# Patient Record
Sex: MALE | Race: WHITE | NOT HISPANIC OR LATINO | Employment: OTHER | URBAN - METROPOLITAN AREA
[De-identification: names, ages, dates, MRNs, and addresses within clinical notes are randomized per-mention and may not be internally consistent; named-entity substitution may affect disease eponyms.]

---

## 2017-10-18 ENCOUNTER — OFFICE VISIT (OUTPATIENT)
Dept: SLEEP MEDICINE | Facility: HOSPITAL | Age: 62
End: 2017-10-18
Attending: INTERNAL MEDICINE

## 2017-10-18 DIAGNOSIS — Z99.89 OSA ON CPAP: Primary | ICD-10-CM

## 2017-10-18 DIAGNOSIS — IMO0001 CLASS 3 OBESITY DUE TO EXCESS CALORIES WITH SERIOUS COMORBIDITY AND BODY MASS INDEX (BMI) OF 40.0 TO 44.9 IN ADULT: ICD-10-CM

## 2017-10-18 DIAGNOSIS — G47.14 HYPERSOMNIA DUE TO ANOTHER MEDICAL CONDITION: ICD-10-CM

## 2017-10-18 DIAGNOSIS — G47.33 OSA ON CPAP: Primary | ICD-10-CM

## 2017-10-18 PROCEDURE — 99213 OFFICE O/P EST LOW 20 MIN: CPT | Performed by: INTERNAL MEDICINE

## 2017-10-18 PROCEDURE — G0463 HOSPITAL OUTPT CLINIC VISIT: HCPCS

## 2017-10-22 PROBLEM — IMO0001 CLASS 3 OBESITY DUE TO EXCESS CALORIES WITH SERIOUS COMORBIDITY AND BODY MASS INDEX (BMI) OF 40.0 TO 44.9 IN ADULT: Status: ACTIVE | Noted: 2017-10-22

## 2017-10-22 PROBLEM — G47.14 HYPERSOMNIA DUE TO ANOTHER MEDICAL CONDITION: Status: ACTIVE | Noted: 2017-10-22

## 2017-10-22 NOTE — PROGRESS NOTES
[facility] Note       Patient Care Team:  Igor Maher MD as PCP - General  Igor Maher MD as PCP - Family Medicine  John Abernathy MD - Sleep Medicine    Chief Complaint:  ANNA     Interval History:   The patient was last seen by me in October 2016.  He is stable and unchanged.  He goes to bed at a p.m. and awakens at 3 AM.  He wakes up 4 times during sleep.  [place] Sleepiness Scale is abnormal at 16.    Review of Systems:  Recorded on the Sleep Questionnaire.  Unremarkable .    Social History:  He does not smoke cigarettes.  No alcohol.  2 sodas a day.    Allergies:  Adhesive tape; Darvon [propoxyphene]; and Oxycodone     Medication Review:  His list was reviewed.      Vital Signs:  Height 72 inches and weight 325 and he is morbidly obese with a body mass index of 44.    Physical Exam:    Constitutional:  Well developed white male and appears in no apparent distress.  Awake & oriented times 3.  Normal mood with normal recent and remote memory and normal judgement.  Eyes:  Conjunctivae normal.  Oropharynx:  moist mucous membranes without exudate and a normal sized tongue and class III MP airway and posterior pharyngeal region not well seen.      Results Review:  DME is [facility] and he uses nasal pillows.  Downloads between April 21 and October 17, 2017 compliances 99% and average usage 6 hours and 58 minutes and average AHI normal without leak and average auto CPAP pressure is 13.  His auto CPAP is 12-20.       Impression:   Obstructive sleep apnea adequately treated with auto titrating CPAP with good compliance and usage and persistent complaints of hypersomnolence.  The patient has a circadian rhythm sleep disorder, advanced sleep phase type.  He arises early to go to work.      Plan:  Good sleep hygiene measures should be maintained.  Weight loss would be beneficial in this patient who is obese by BMI.  The patient is benefiting from the treatment being provided.     The  patient will continue auto CPAP.  A new prescription will be sent to his DME.    The patient will call for any problems and will follow up in one year.      John Abernathy MD  10/22/17  3:21 PM

## 2018-10-17 ENCOUNTER — OFFICE VISIT (OUTPATIENT)
Dept: SLEEP MEDICINE | Facility: HOSPITAL | Age: 63
End: 2018-10-17
Attending: INTERNAL MEDICINE

## 2018-10-17 VITALS
WEIGHT: 315 LBS | OXYGEN SATURATION: 96 % | BODY MASS INDEX: 41.75 KG/M2 | DIASTOLIC BLOOD PRESSURE: 79 MMHG | SYSTOLIC BLOOD PRESSURE: 154 MMHG | HEIGHT: 73 IN | HEART RATE: 88 BPM

## 2018-10-17 DIAGNOSIS — Z99.89 OSA ON CPAP: Primary | ICD-10-CM

## 2018-10-17 DIAGNOSIS — G47.14 HYPERSOMNIA DUE TO ANOTHER MEDICAL CONDITION: ICD-10-CM

## 2018-10-17 DIAGNOSIS — G47.26 CIRCADIAN RHYTHM SLEEP DISORDER, SHIFT WORK TYPE: ICD-10-CM

## 2018-10-17 DIAGNOSIS — E66.01 CLASS 3 SEVERE OBESITY DUE TO EXCESS CALORIES WITH SERIOUS COMORBIDITY AND BODY MASS INDEX (BMI) OF 40.0 TO 44.9 IN ADULT (HCC): ICD-10-CM

## 2018-10-17 DIAGNOSIS — G47.22 CIRCADIAN RHYTHM SLEEP DISORDER, ADVANCED SLEEP PHASE TYPE: ICD-10-CM

## 2018-10-17 DIAGNOSIS — G47.33 OSA ON CPAP: Primary | ICD-10-CM

## 2018-10-17 PROCEDURE — 99213 OFFICE O/P EST LOW 20 MIN: CPT | Performed by: INTERNAL MEDICINE

## 2018-10-17 PROCEDURE — G0463 HOSPITAL OUTPT CLINIC VISIT: HCPCS

## 2018-10-17 NOTE — PROGRESS NOTES
"[facility] Note     Chief Complaint:  ANNA     Primary Care Physician: Igor Maher MD    Interval History:   The patient was seen one year ago.  He is stable.  He goes to bed at a p.m. and awakens at 3 AM.  He wakes up twice during the nighttime and will use the bathroom.  [place] Sleepiness Scale abnormal at 15.    Review of Systems:  Recorded on the Sleep Questionnaire.  Unremarkable     Social History:    Social History     Social History   • Marital status:      Social History Main Topics   • Smoking status: Never Smoker   • Alcohol use No   • Drug use: Unknown     Other Topics Concern   • Not on file       Allergies:  Adhesive tape; Darvon [propoxyphene]; and Oxycodone     Medication Review:  Reviewed.      Vital Signs:    Vitals:    10/17/18 0700   BP: 154/79   BP Location: Left arm   Patient Position: Sitting   Pulse: 88   SpO2: 96%   Weight: (!) 151 kg (332 lb 8 oz)   Height: 185.4 cm (73\")     Body mass index is 43.87 kg/m².    Physical Exam:    Constitutional:  Well developed white male and appears in no apparent distress.  Awake & oriented times 3.  Normal mood with normal recent and remote memory and normal judgement.  Eyes:  Conjunctivae normal.  Oropharynx:  moist mucous membranes without exudate and a normal sized tongue and class III MP airway      Results Review:  DME is [facility] and he uses a nasal interface.  Downloads between July 19 and October 16, 2018 compliance 100%.  Average usage is 7 hours and 35 minutes.  Average AHI is normal without leak.  Average AutoCPAP pressure is 12.9 and his auto CPAP is 12-20.       Impression:   Obstructive sleep apnea adequately treated with auto titrating CPAP with good compliance and usage and persistent complaints of hypersomnolence.  The patient has a circadian rhythm sleep disorder, advanced sleep phase type.  He arises early to go to work.         Plan:  Good sleep hygiene measures should be maintained.  Weight loss would " be beneficial in this patient who is morbidly obese by BMI.  The patient is benefiting from the treatment being provided.     Patient will continue auto CPAP.  A new prescription will be sent to a new DME for all needed supplies.    The patient will call for any problems and will follow up in one year.      John Abernathy MD  Sleep Medicine  10/17/18  8:37 AM

## 2018-10-30 PROBLEM — E66.01 CLASS 3 SEVERE OBESITY DUE TO EXCESS CALORIES WITH SERIOUS COMORBIDITY AND BODY MASS INDEX (BMI) OF 40.0 TO 44.9 IN ADULT: Status: ACTIVE | Noted: 2017-10-22

## 2018-10-30 PROBLEM — G47.26 CIRCADIAN RHYTHM SLEEP DISORDER, SHIFT WORK TYPE: Status: ACTIVE | Noted: 2018-10-30

## 2018-10-30 PROBLEM — E66.813 CLASS 3 SEVERE OBESITY DUE TO EXCESS CALORIES WITH SERIOUS COMORBIDITY AND BODY MASS INDEX (BMI) OF 40.0 TO 44.9 IN ADULT: Status: ACTIVE | Noted: 2017-10-22

## 2018-10-30 PROBLEM — G47.22 CIRCADIAN RHYTHM SLEEP DISORDER, ADVANCED SLEEP PHASE TYPE: Status: ACTIVE | Noted: 2018-10-30

## 2018-11-13 ENCOUNTER — OFFICE VISIT (OUTPATIENT)
Dept: ORTHOPEDIC SURGERY | Facility: CLINIC | Age: 63
End: 2018-11-13

## 2018-11-13 VITALS — HEIGHT: 73 IN | WEIGHT: 315 LBS | TEMPERATURE: 99 F | BODY MASS INDEX: 41.75 KG/M2

## 2018-11-13 DIAGNOSIS — M17.12 PRIMARY OSTEOARTHRITIS OF LEFT KNEE: Primary | ICD-10-CM

## 2018-11-13 PROCEDURE — 99213 OFFICE O/P EST LOW 20 MIN: CPT | Performed by: NURSE PRACTITIONER

## 2018-11-13 PROCEDURE — 20610 DRAIN/INJ JOINT/BURSA W/O US: CPT | Performed by: NURSE PRACTITIONER

## 2018-11-13 PROCEDURE — 73562 X-RAY EXAM OF KNEE 3: CPT | Performed by: NURSE PRACTITIONER

## 2018-11-13 RX ORDER — METHYLPREDNISOLONE ACETATE 80 MG/ML
80 INJECTION, SUSPENSION INTRA-ARTICULAR; INTRALESIONAL; INTRAMUSCULAR; SOFT TISSUE
Status: COMPLETED | OUTPATIENT
Start: 2018-11-13 | End: 2018-11-13

## 2018-11-13 RX ORDER — LIDOCAINE HYDROCHLORIDE 10 MG/ML
2 INJECTION, SOLUTION EPIDURAL; INFILTRATION; INTRACAUDAL; PERINEURAL
Status: COMPLETED | OUTPATIENT
Start: 2018-11-13 | End: 2018-11-13

## 2018-11-13 RX ADMIN — LIDOCAINE HYDROCHLORIDE 2 ML: 10 INJECTION, SOLUTION EPIDURAL; INFILTRATION; INTRACAUDAL; PERINEURAL at 09:10

## 2018-11-13 RX ADMIN — METHYLPREDNISOLONE ACETATE 80 MG: 80 INJECTION, SUSPENSION INTRA-ARTICULAR; INTRALESIONAL; INTRAMUSCULAR; SOFT TISSUE at 09:10

## 2018-11-13 NOTE — PROGRESS NOTES
"Patient: John Paul Cisneros  YOB: 1955 63 y.o. male  Medical Record Number: [id number]    Chief Complaints:   Chief Complaint   Patient presents with   • Left Knee - Establish Care, Pain       History of Present Illness:John Paul Cisneros is a 63 y.o. male who presents with a new complaint of left knee pain.  Previous right total knee replacement couple years ago and reports right knee\" is doing great\" but started with increased left knee pain about a month ago after doing some yardwork for his mom.  He describes left knee pain as a moderate intermittent ache with buckling and swelling.  Worse with standing sitting.    Allergies:   Allergies   Allergen Reactions   • Adhesive Tape    • Darvon [Propoxyphene]    • Oxycodone        Medications:   Current Outpatient Medications   Medication Sig Dispense Refill   • AMLODIPINE BESYLATE PO Take 10 mg by mouth.     • aspirin (KAY ASPIRIN EC LOW DOSE) 81 MG EC tablet Take 81 mg by mouth Daily.     • atorvastatin (LIPITOR) 80 MG tablet Take 80 mg by mouth Daily.     • febuxostat (ULORIC) 40 MG tablet Take 40 mg by mouth Daily.     • omeprazole (priLOSEC) 40 MG capsule Take 40 mg by mouth Daily.     • Testosterone (AXIRON) 30 MG/ACT solution Place  on the skin.       No current facility-administered medications for this visit.          The following portions of the patient's history were reviewed and updated as appropriate: allergies, current medications, past family history, past medical history, past social history, past surgical history and problem list.    Review of Systems:   A 14 point review of systems was performed. All systems negative except pertinent positives/negative listed in HPI above    Physical Exam:   Vitals:    11/13/18 0846   Temp: 99 °F (37.2 °C)   TempSrc: Temporal   Weight: (!) 149 kg (329 lb)   Height: 185.4 cm (73\")       General: A and O x 3, ASA, NAD    SCLERA:    Normal    DENTITION:   Normal  Skin clear no unusual lesions noted  Left " knee patient has trace amount of effusion noted with 115° flexion neutron extension with a positive Rasheed negative Lockman calf soft and nontender    Radiology:  Xrays 3views (ap,lateral, sunrise) left knee were ordered and reviewed today secondary to pain and show bone-on-bone end-stage osteoarthritis.  Comparative views are unchanged     Assessment/Plan:  End-stage osteoarthritis left knee    Prior to injection risks were discussed including pain, infection, elevated blood sugar.  Patient verbalized understanding would like to proceed with injection.  He was referred outpatient physical therapy.  We set a 30 pound weight loss goal because most likely the patient is planning need a total knee replacement at some point.  Patient verbalized understanding and we will see him back as needed    Large Joint Arthrocentesis: L knee  Date/Time: 11/13/2018 9:10 AM  Consent given by: patient  Site marked: site marked  Timeout: Immediately prior to procedure a time out was called to verify the correct patient, procedure, equipment, support staff and site/side marked as required   Supporting Documentation  Indications: pain and joint swelling   Procedure Details  Location: knee - L knee  Preparation: Patient was prepped and draped in the usual sterile fashion  Needle gauge: 21 G.  Approach: anterolateral  Medications administered: 80 mg methylPREDNISolone acetate 80 MG/ML; 2 mL lidocaine PF 1% 1 %  Patient tolerance: patient tolerated the procedure well with no immediate complications

## 2018-12-03 ENCOUNTER — TELEPHONE (OUTPATIENT)
Dept: SLEEP MEDICINE | Facility: HOSPITAL | Age: 63
End: 2018-12-03

## 2018-12-03 NOTE — TELEPHONE ENCOUNTER
Faxed order for new equipment to [facility] [place]. Patient previously with [facility] not happy with them and requested to go to [facility]

## 2019-07-10 ENCOUNTER — HOSPITAL ENCOUNTER (EMERGENCY)
Facility: HOSPITAL | Age: 64
Discharge: HOME | End: 2019-07-10
Attending: EMERGENCY MEDICINE | Admitting: EMERGENCY MEDICINE

## 2019-07-10 ENCOUNTER — APPOINTMENT (OUTPATIENT)
Dept: GENERAL RADIOLOGY | Facility: HOSPITAL | Age: 64
End: 2019-07-10

## 2019-07-10 VITALS
RESPIRATION RATE: 18 BRPM | HEIGHT: 72 IN | HEART RATE: 75 BPM | OXYGEN SATURATION: 98 % | DIASTOLIC BLOOD PRESSURE: 94 MMHG | SYSTOLIC BLOOD PRESSURE: 164 MMHG | WEIGHT: 315 LBS | TEMPERATURE: 98.4 F | BODY MASS INDEX: 42.66 KG/M2

## 2019-07-10 DIAGNOSIS — K21.00 GASTROESOPHAGEAL REFLUX DISEASE WITH ESOPHAGITIS: ICD-10-CM

## 2019-07-10 DIAGNOSIS — R07.89 ATYPICAL CHEST PAIN: Primary | ICD-10-CM

## 2019-07-10 LAB
ALBUMIN SERPL-MCNC: 3.9 G/DL (ref 3.5–5.2)
ALBUMIN/GLOB SERPL: 1 G/DL
ALP SERPL-CCNC: 124 U/L (ref 39–117)
ALT SERPL W P-5'-P-CCNC: 18 U/L (ref 1–41)
ANION GAP SERPL CALCULATED.3IONS-SCNC: 13.3 MMOL/L (ref 5–15)
APTT PPP: 31.9 SECONDS (ref 24.3–38.1)
AST SERPL-CCNC: 18 U/L (ref 1–40)
BASOPHILS # BLD AUTO: 0.07 10*3/MM3 (ref 0–0.2)
BASOPHILS NFR BLD AUTO: 0.8 % (ref 0–1.5)
BILIRUB SERPL-MCNC: 0.4 MG/DL (ref 0.2–1.2)
BUN BLD-MCNC: 14 MG/DL (ref 8–23)
BUN/CREAT SERPL: 15.6 (ref 7–25)
CALCIUM SPEC-SCNC: 9 MG/DL (ref 8.6–10.5)
CHLORIDE SERPL-SCNC: 102 MMOL/L (ref 98–107)
CO2 SERPL-SCNC: 24.7 MMOL/L (ref 22–29)
CREAT BLD-MCNC: 0.9 MG/DL (ref 0.76–1.27)
D DIMER PPP FEU-MCNC: 0.46 MCGFEU/ML (ref 0–0.46)
DEPRECATED RDW RBC AUTO: 42.2 FL (ref 37–54)
EOSINOPHIL # BLD AUTO: 0.23 10*3/MM3 (ref 0–0.4)
EOSINOPHIL NFR BLD AUTO: 2.7 % (ref 0.3–6.2)
ERYTHROCYTE [DISTWIDTH] IN BLOOD BY AUTOMATED COUNT: 13.5 % (ref 12.3–15.4)
GFR SERPL CREATININE-BSD FRML MDRD: 84 ML/MIN/1.73
GLOBULIN UR ELPH-MCNC: 4 GM/DL
GLUCOSE BLD-MCNC: 133 MG/DL (ref 65–99)
HCT VFR BLD AUTO: 51.9 % (ref 37.5–51)
HGB BLD-MCNC: 16.5 G/DL (ref 13–17.7)
IMM GRANULOCYTES # BLD AUTO: 0.06 10*3/MM3 (ref 0–0.05)
IMM GRANULOCYTES NFR BLD AUTO: 0.7 % (ref 0–0.5)
INR PPP: 0.94 (ref 0.9–1.1)
LIPASE SERPL-CCNC: 18 U/L (ref 13–60)
LYMPHOCYTES # BLD AUTO: 1.45 10*3/MM3 (ref 0.7–3.1)
LYMPHOCYTES NFR BLD AUTO: 17.3 % (ref 19.6–45.3)
MCH RBC QN AUTO: 27.3 PG (ref 26.6–33)
MCHC RBC AUTO-ENTMCNC: 31.8 G/DL (ref 31.5–35.7)
MCV RBC AUTO: 85.8 FL (ref 79–97)
MONOCYTES # BLD AUTO: 1.01 10*3/MM3 (ref 0.1–0.9)
MONOCYTES NFR BLD AUTO: 12.1 % (ref 5–12)
NEUTROPHILS # BLD AUTO: 5.55 10*3/MM3 (ref 1.7–7)
NEUTROPHILS NFR BLD AUTO: 66.4 % (ref 42.7–76)
NRBC BLD AUTO-RTO: 0 /100 WBC (ref 0–0.2)
PLATELET # BLD AUTO: 339 10*3/MM3 (ref 140–450)
PMV BLD AUTO: 9.9 FL (ref 6–12)
POTASSIUM BLD-SCNC: 4.4 MMOL/L (ref 3.5–5.2)
PROT SERPL-MCNC: 7.9 G/DL (ref 6–8.5)
PROTHROMBIN TIME: 12.3 SECONDS (ref 12.1–15)
RBC # BLD AUTO: 6.05 10*6/MM3 (ref 4.14–5.8)
SODIUM BLD-SCNC: 140 MMOL/L (ref 136–145)
TROPONIN T SERPL-MCNC: <0.01 NG/ML (ref 0–0.03)
TROPONIN T SERPL-MCNC: <0.01 NG/ML (ref 0–0.03)
WBC NRBC COR # BLD: 8.37 10*3/MM3 (ref 3.4–10.8)

## 2019-07-10 PROCEDURE — 85730 THROMBOPLASTIN TIME PARTIAL: CPT | Performed by: EMERGENCY MEDICINE

## 2019-07-10 PROCEDURE — 80053 COMPREHEN METABOLIC PANEL: CPT | Performed by: EMERGENCY MEDICINE

## 2019-07-10 PROCEDURE — 96374 THER/PROPH/DIAG INJ IV PUSH: CPT

## 2019-07-10 PROCEDURE — 99284 EMERGENCY DEPT VISIT MOD MDM: CPT

## 2019-07-10 PROCEDURE — 85610 PROTHROMBIN TIME: CPT | Performed by: EMERGENCY MEDICINE

## 2019-07-10 PROCEDURE — 93005 ELECTROCARDIOGRAM TRACING: CPT | Performed by: EMERGENCY MEDICINE

## 2019-07-10 PROCEDURE — 93010 ELECTROCARDIOGRAM REPORT: CPT | Performed by: INTERNAL MEDICINE

## 2019-07-10 PROCEDURE — 83690 ASSAY OF LIPASE: CPT | Performed by: EMERGENCY MEDICINE

## 2019-07-10 PROCEDURE — 71046 X-RAY EXAM CHEST 2 VIEWS: CPT

## 2019-07-10 PROCEDURE — 85025 COMPLETE CBC W/AUTO DIFF WBC: CPT | Performed by: EMERGENCY MEDICINE

## 2019-07-10 PROCEDURE — 84484 ASSAY OF TROPONIN QUANT: CPT | Performed by: EMERGENCY MEDICINE

## 2019-07-10 PROCEDURE — 85379 FIBRIN DEGRADATION QUANT: CPT | Performed by: EMERGENCY MEDICINE

## 2019-07-10 PROCEDURE — 99284 EMERGENCY DEPT VISIT MOD MDM: CPT | Performed by: EMERGENCY MEDICINE

## 2019-07-10 RX ORDER — ASPIRIN 81 MG/1
TABLET, CHEWABLE ORAL
Status: DISCONTINUED
Start: 2019-07-10 | End: 2019-07-10 | Stop reason: HOSPADM

## 2019-07-10 RX ORDER — MEPERIDINE HYDROCHLORIDE 25 MG/ML
25 INJECTION INTRAMUSCULAR; INTRAVENOUS; SUBCUTANEOUS ONCE
Status: COMPLETED | OUTPATIENT
Start: 2019-07-10 | End: 2019-07-10

## 2019-07-10 RX ORDER — ASPIRIN 81 MG/1
243 TABLET, CHEWABLE ORAL DAILY
Status: DISCONTINUED | OUTPATIENT
Start: 2019-07-10 | End: 2019-07-10 | Stop reason: HOSPADM

## 2019-07-10 RX ORDER — SODIUM CHLORIDE 0.9 % (FLUSH) 0.9 %
10 SYRINGE (ML) INJECTION AS NEEDED
Status: DISCONTINUED | OUTPATIENT
Start: 2019-07-10 | End: 2019-07-10 | Stop reason: HOSPADM

## 2019-07-10 RX ORDER — SUCRALFATE 1 G/1
1 TABLET ORAL 4 TIMES DAILY
Qty: 60 TABLET | Refills: 0 | Status: SHIPPED | OUTPATIENT
Start: 2019-07-10 | End: 2019-08-05 | Stop reason: HOSPADM

## 2019-07-10 RX ORDER — SILDENAFIL CITRATE 20 MG/1
20 TABLET ORAL 3 TIMES DAILY
COMMUNITY
End: 2019-11-05

## 2019-07-10 RX ORDER — ALUMINA, MAGNESIA, AND SIMETHICONE 2400; 2400; 240 MG/30ML; MG/30ML; MG/30ML
SUSPENSION ORAL
Status: COMPLETED
Start: 2019-07-10 | End: 2019-07-10

## 2019-07-10 RX ORDER — MELOXICAM 15 MG/1
15 TABLET ORAL DAILY
COMMUNITY
End: 2019-11-12 | Stop reason: HOSPADM

## 2019-07-10 RX ORDER — MAGNESIUM HYDROXIDE/ALUMINUM HYDROXICE/SIMETHICONE 120; 1200; 1200 MG/30ML; MG/30ML; MG/30ML
20 SUSPENSION ORAL ONCE
Status: DISCONTINUED | OUTPATIENT
Start: 2019-07-10 | End: 2019-07-10 | Stop reason: HOSPADM

## 2019-07-10 RX ADMIN — LIDOCAINE HYDROCHLORIDE 5 ML: 20 SOLUTION ORAL; TOPICAL at 07:56

## 2019-07-10 RX ADMIN — ALUMINUM HYDROXIDE, MAGNESIUM HYDROXIDE, AND DIMETHICONE 30 ML: 400; 400; 40 SUSPENSION ORAL at 07:56

## 2019-07-10 RX ADMIN — NITROGLYCERIN 1 INCH: 20 OINTMENT TOPICAL at 06:38

## 2019-07-10 RX ADMIN — MEPERIDINE HYDROCHLORIDE 25 MG: 25 INJECTION INTRAMUSCULAR; INTRAVENOUS; SUBCUTANEOUS at 08:22

## 2019-07-10 NOTE — ED PROVIDER NOTES
EMERGENCY DEPARTMENT ENCOUNTER      Room Number: 2/02      HPI:    Chief complaint: Lower sternal chest pain    Location: Lower sternal area    Quality/Severity: Described as pressure-like and moderate severity    Timing/Duration: Symptoms started approximately 6 hours prior to arrival    Modifying Factors: Initially, the pain was partly relieved by upright position    Associated Symptoms: Mild nausea    Narrative: Pt is a 64 y.o. male who presents complaining of lower sternal chest pain as noted above.  Patient states that he was awakened by lower sternal chest pain/pressure approximately 6 hours prior to arrival.  The patient states that at first standing up did seem to help relieve the symptoms, but now standing does not seem to make any difference.  Mild nausea.  Family history of coronary artery disease in the patient's father who had a bypass in his 60s.      PMD: Igor Maher MD    REVIEW OF SYSTEMS  Review of Systems   Constitutional: Negative for fatigue and fever.   HENT: Negative for congestion.    Respiratory: Negative for cough, shortness of breath and wheezing.    Cardiovascular: Positive for chest pain. Negative for palpitations.   Gastrointestinal: Positive for nausea. Negative for abdominal pain, diarrhea and vomiting.   Genitourinary: Negative for dysuria, flank pain, hematuria and urgency.   Musculoskeletal: Negative for back pain.   Skin: Negative for rash.   Neurological: Negative for dizziness, weakness and headaches.   Psychiatric/Behavioral: Negative for confusion.   All other systems reviewed and are negative.      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 10/22/2017   • Class 3 severe obesity due to excess calories with serious comorbidity and body mass index (BMI) of 40.0 to 44.9 in adult (CMS/[facility]) 10/22/2017   • Circadian rhythm sleep disorder, advanced sleep phase type  10/30/2018   • Circadian rhythm sleep disorder, shift work type 10/30/2018     Resolved Ambulatory Problems     Diagnosis Date Noted   • No Resolved Ambulatory Problems     Past Medical History:   Diagnosis Date   • DVT (deep venous thrombosis) (CMS/HCC)    • Hypertension    • Osteoarthritis    • Sleep apnea        PAST SURGICAL HISTORY  Past Surgical History:   Procedure Laterality Date   • CARPAL TUNNEL RELEASE  1978   • CHOLECYSTECTOMY  02/2014   • HAMMER TOE REPAIR  2000   • HERNIA REPAIR     • HIP BIPOLAR REPLACEMENT Right 10/2010   • KNEE ARTHROSCOPY  10/2014   • THYROID SURGERY         FAMILY HISTORY  Family History   Problem Relation Age of Onset   • Hypertension Other    • Colon cancer Other        SOCIAL HISTORY  Social History     Socioeconomic History   • Marital status:      Spouse name: Not on file   • Number of children: Not on file   • Years of education: Not on file   • Highest education level: Not on file   Tobacco Use   • Smoking status: Never Smoker   Substance and Sexual Activity   • Alcohol use: No   • Drug use: No       ALLERGIES  Darvon [propoxyphene]; Oxycodone; and Adhesive tape    PHYSICAL EXAM  ED Triage Vitals [07/10/19 0613]   Temp Heart Rate Resp BP SpO2   98.4 °F (36.9 °C) 70 14 (!) 186/96 97 %      Temp src Heart Rate Source Patient Position BP Location FiO2 (%)   Oral Monitor Lying Right arm --       Physical Exam   Constitutional: He is oriented to person, place, and time.   The patient is an obese, 64-year-old, white male in no acute distress, although he appears mildly anxious   HENT:   Head: Normocephalic and atraumatic.   Eyes: Conjunctivae and EOM are normal.   Neck: Normal range of motion. Neck supple.   Cardiovascular: Normal rate, regular rhythm and normal heart sounds.   No murmur heard.  Pulmonary/Chest: Effort normal and breath sounds normal. No respiratory distress.   Abdominal: Soft. Bowel sounds are normal. There is no tenderness.   Musculoskeletal: Normal  range of motion. He exhibits no edema.   Neurological: He is alert and oriented to person, place, and time.   Skin: Skin is warm and dry.   Psychiatric: Affect and judgment normal.   Nursing note and vitals reviewed.      LAB RESULTS  Results for orders placed or performed during the hospital encounter of 07/10/19   Comprehensive Metabolic Panel   Result Value Ref Range    Glucose 133 (H) 65 - 99 mg/dL    BUN 14 8 - 23 mg/dL    Creatinine 0.90 0.76 - 1.27 mg/dL    Sodium 140 136 - 145 mmol/L    Potassium 4.4 3.5 - 5.2 mmol/L    Chloride 102 98 - 107 mmol/L    CO2 24.7 22.0 - 29.0 mmol/L    Calcium 9.0 8.6 - 10.5 mg/dL    Total Protein 7.9 6.0 - 8.5 g/dL    Albumin 3.90 3.50 - 5.20 g/dL    ALT (SGPT) 18 1 - 41 U/L    AST (SGOT) 18 1 - 40 U/L    Alkaline Phosphatase 124 (H) 39 - 117 U/L    Total Bilirubin 0.4 0.2 - 1.2 mg/dL    eGFR Non African Amer 84 >60 mL/min/1.73    Globulin 4.0 gm/dL    A/G Ratio 1.0 g/dL    BUN/Creatinine Ratio 15.6 7.0 - 25.0    Anion Gap 13.3 5.0 - 15.0 mmol/L   Protime-INR   Result Value Ref Range    Protime 12.3 12.1 - 15.0 Seconds    INR 0.94 0.90 - 1.10   aPTT   Result Value Ref Range    PTT 31.9 24.3 - 38.1 seconds   Lipase   Result Value Ref Range    Lipase 18 13 - 60 U/L   Troponin   Result Value Ref Range    Troponin T <0.010 0.000-<0.030 ng/mL   D-dimer, Quantitative   Result Value Ref Range    D-Dimer, Quantitative 0.46 0.00 - 0.46 MCGFEU/mL   CBC Auto Differential   Result Value Ref Range    WBC 8.37 3.40 - 10.80 10*3/mm3    RBC 6.05 (H) 4.14 - 5.80 10*6/mm3    Hemoglobin 16.5 13.0 - 17.7 g/dL    Hematocrit 51.9 (H) 37.5 - 51.0 %    MCV 85.8 79.0 - 97.0 fL    MCH 27.3 26.6 - 33.0 pg    MCHC 31.8 31.5 - 35.7 g/dL    RDW 13.5 12.3 - 15.4 %    RDW-SD 42.2 37.0 - 54.0 fl    MPV 9.9 6.0 - 12.0 fL    Platelets 339 140 - 450 10*3/mm3    Neutrophil % 66.4 42.7 - 76.0 %    Lymphocyte % 17.3 (L) 19.6 - 45.3 %    Monocyte % 12.1 (H) 5.0 - 12.0 %    Eosinophil % 2.7 0.3 - 6.2 %    Basophil %  0.8 0.0 - 1.5 %    Immature Grans % 0.7 (H) 0.0 - 0.5 %    Neutrophils, Absolute 5.55 1.70 - 7.00 10*3/mm3    Lymphocytes, Absolute 1.45 0.70 - 3.10 10*3/mm3    Monocytes, Absolute 1.01 (H) 0.10 - 0.90 10*3/mm3    Eosinophils, Absolute 0.23 0.00 - 0.40 10*3/mm3    Basophils, Absolute 0.07 0.00 - 0.20 10*3/mm3    Immature Grans, Absolute 0.06 (H) 0.00 - 0.05 10*3/mm3    nRBC 0.0 0.0 - 0.2 /100 WBC   Troponin   Result Value Ref Range    Troponin T <0.010 0.000-<0.030 ng/mL         I ordered the above labs and reviewed the results    RADIOLOGY  Xr Chest 2 View    Result Date: 7/10/2019  Narrative: CHEST X-RAY, 07/10/2019     HISTORY: 64-year-old male in the ED complaining of new onset midsternal chest pain beginning last evening.  TECHNIQUE: PA and lateral upright chest x-ray.  FINDINGS: Mild cardiomegaly. Pulmonary vascularity is normal.  Shallow lung expansion with linear scarring or atelectasis in the left lung base. Lungs appear clear. No visible pulmonary edema, airspace consolidation or pleural effusion.  Rounded opacity behind the heart may represent a hiatal hernia.      Impression: 1. No active disease. 2. Mild cardiomegaly. Pulmonary vascularity is normal. 3. Possible hiatal hernia.  This report was finalized on 7/10/2019 7:58 AM by Dr. Stephan Armenta MD.        I ordered the above radiologic testing and reviewed the results    PROCEDURES  Procedures      PROGRESS AND CONSULTS  ED Course as of Jul 10 0948   Wed Jul 10, 2019   0635 Initial EKG reviewed at 061 4 hours and felt to be unreadable due to poor baseline.  Repeat EKG tracing was contemporaneously reviewed at 062 1 hours and showed a normal sinus rhythm with a rate of 68 bpm.  Electrical evidence suggestive of LVH.  P waves, ST segments and T waves unremarkable.  No ectopy.  However, the bedside cardiac monitor did show an occasional unifocal PVC.  [ML]   0729 Patient still with complaint of same discomfort.  All labs noted and unremarkable at this  point.  Modified GI cocktail will be administered as the patient does have a history of GERD.  [ML]   0805 Final radiology report noted.  Patient agreeable to repeat troponin.  [ML]   0816 Case and findings discussed with the oncoming emergency department physician, Dr. Castillo, who has agreed to assume care and disposition on this patient.  No relief with a GI cocktail and a small dose of Demerol will be given.  [ML]   0923 08:15 care assumed from Dr. Rivers.  [TP]   0923 The patient's delta troponin was negative.  In discussion with the patient and he describes the pain is being mid upper abdomen radiating up substernally.  Is my impression, as it was Dr. Rivers's that the pain is GI in origin.  The patient currently takes Prilosec which I encouraged him to continue.  I will prescribe him Carafate.  The patient will be instructed to make a follow-up appointment with his PCP Dr. Maher in the next couple of days.  He will be referred to follow-up with Dr. Bone, GI.  [TP]      ED Course User Index  [ML] Nick Rivers MD  [TP] Nura Castillo MD           MEDICAL DECISION MAKING  Results were reviewed/discussed with the patient and they were also made aware of online access. Pt also made aware that some labs, such as cultures, will not be resulted during ER visit and follow up with PMD is necessary.     MDM  Number of Diagnoses or Management Options  Atypical chest pain: new and requires workup  Gastroesophageal reflux disease with esophagitis: new and requires workup     Amount and/or Complexity of Data Reviewed  Clinical lab tests: ordered and reviewed  Tests in the radiology section of CPT®: ordered and reviewed  Tests in the medicine section of CPT®: ordered and reviewed  Independent visualization of images, tracings, or specimens: yes    Risk of Complications, Morbidity, and/or Mortality  Presenting problems: high  Diagnostic procedures: high  Management options: high  General comments: My  differential diagnosis for chest pain includes but is not limited to:  Muscle strain, costochondritis, myositis, pleurisy, rib fracture, intercostal neuritis, herpes zoster, tumor, myocardial infarction, coronary syndrome, unstable angina, angina, aortic dissection, mitral valve prolapse, pericarditis, palpitations, pulmonary embolus, pneumonia, pneumothorax, lung cancer, GERD, esophagitis, esophageal spasm    Patient Progress  Patient progress: improved         DIAGNOSIS  Final diagnoses:   Atypical chest pain   Gastroesophageal reflux disease with esophagitis       Latest Documented Vital Signs:  As of 9:48 AM  BP- 164/94 HR- 75 Temp- 98.4 °F (36.9 °C) O2 sat- 98%    DISPOSITION  discharge       Medication List      New Prescriptions    sucralfate 1 g tablet  Commonly known as:  CARAFATE  Take 1 tablet by mouth 4 (Four) Times a Day for 60 days.          Follow-up Information     Igor Maher MD. Schedule an appointment as soon as possible for a visit in 2 days.    Specialty:  Family Medicine  Why:  next available  Contact information:  [address] [place] [address] [address]  [facility] [address] [phone]             Schedule an appointment as soon as possible for a visit  with Felton Bone MD.    Specialty:  Gastroenterology  Why:  next available  Contact information:  [address] [facility] [address]  [facility] [id number] [phone]                     Labs Reviewed   COMPREHENSIVE METABOLIC PANEL - Abnormal; Notable for the following components:       Result Value    Glucose 133 (*)     Alkaline Phosphatase 124 (*)     All other components within normal limits    Narrative:     GFR Normal >60  Chronic Kidney Disease <60  Kidney Failure <15   CBC WITH AUTO DIFFERENTIAL - Abnormal; Notable for the following components:    RBC 6.05 (*)     Hematocrit 51.9 (*)     Lymphocyte % 17.3 (*)     Monocyte % 12.1 (*)     Immature Grans % 0.7 (*)     Monocytes, Absolute 1.01 (*)     Immature Grans, Absolute 0.06 (*)      All other components within normal limits   PROTIME-INR - Normal    Narrative:     Therapeutic Ranges for INR: 2.0-3.0 (PT 20-30)                              2.5-3.5 (PT 25-34)   APTT - Normal    Narrative:     PTT = The equivalent PTT values for the therapeutic range of heparin levels at 0.1 to 0.7 U/ml are 53 to 110 seconds.   LIPASE - Normal   TROPONIN (IN-HOUSE) - Normal    Narrative:     Troponin T Reference Range:  <= 0.03 ng/mL-   Negative for AMI  >0.03 ng/mL-     Abnormal for myocardial necrosis.  Clinicians would have to utilize clinical acumen, EKG, Troponin and serial changes to determine if it is an Acute Myocardial Infarction or myocardial injury due to an underlying chronic condition.    D-DIMER, QUANTITATIVE - Normal    Narrative:     Can be elevated in, but is not diagnostic for deep vein thrombosis (DVT) or pulmonary embolis (PE).  It is also elevated in other medical conditions.  Clinical correlation is required.  The negative cut-off value for the D-Dimer is 0.50 mcg FEU/mL for DVT and PE.   TROPONIN (IN-HOUSE) - Normal    Narrative:     Troponin T Reference Range:  <= 0.03 ng/mL-   Negative for AMI  >0.03 ng/mL-     Abnormal for myocardial necrosis.  Clinicians would have to utilize clinical acumen, EKG, Troponin and serial changes to determine if it is an Acute Myocardial Infarction or myocardial injury due to an underlying chronic condition.    CBC AND DIFFERENTIAL    Narrative:     The following orders were created for panel order CBC & Differential.  Procedure                               Abnormality         Status                     ---------                               -----------         ------                     CBC Auto Differential[[id number]]        Abnormal            Final result                 Please view results for these tests on the individual orders.     XR Chest 2 View   ED Interpretation   1. No active disease.   2. Mild cardiomegaly. Pulmonary vascularity is normal.    3. Possible hiatal hernia.       This report was finalized on 7/10/2019 7:58 AM by Dr. Stephan Armenta MD.          Final Result   1. No active disease.   2. Mild cardiomegaly. Pulmonary vascularity is normal.   3. Possible hiatal hernia.       This report was finalized on 7/10/2019 7:58 AM by Dr. Stephan Armenta MD.                 Medication List      New Prescriptions    sucralfate 1 g tablet  Commonly known as:  CARAFATE  Take 1 tablet by mouth 4 (Four) Times a Day for 60 days.               Nura Castillo MD  07/10/19 0949

## 2019-07-16 ENCOUNTER — CLINICAL SUPPORT (OUTPATIENT)
Dept: ORTHOPEDIC SURGERY | Facility: CLINIC | Age: 64
End: 2019-07-16

## 2019-07-16 VITALS — HEIGHT: 73 IN | WEIGHT: 315 LBS | TEMPERATURE: 98.2 F | BODY MASS INDEX: 41.75 KG/M2

## 2019-07-16 DIAGNOSIS — M25.562 LEFT KNEE PAIN, UNSPECIFIED CHRONICITY: Primary | ICD-10-CM

## 2019-07-16 PROCEDURE — 20610 DRAIN/INJ JOINT/BURSA W/O US: CPT | Performed by: NURSE PRACTITIONER

## 2019-07-16 RX ORDER — LIDOCAINE HYDROCHLORIDE 10 MG/ML
2 INJECTION, SOLUTION EPIDURAL; INFILTRATION; INTRACAUDAL; PERINEURAL
Status: COMPLETED | OUTPATIENT
Start: 2019-07-16 | End: 2019-07-16

## 2019-07-16 RX ORDER — METHYLPREDNISOLONE ACETATE 80 MG/ML
80 INJECTION, SUSPENSION INTRA-ARTICULAR; INTRALESIONAL; INTRAMUSCULAR; SOFT TISSUE
Status: COMPLETED | OUTPATIENT
Start: 2019-07-16 | End: 2019-07-16

## 2019-07-16 RX ADMIN — METHYLPREDNISOLONE ACETATE 80 MG: 80 INJECTION, SUSPENSION INTRA-ARTICULAR; INTRALESIONAL; INTRAMUSCULAR; SOFT TISSUE at 13:45

## 2019-07-16 RX ADMIN — LIDOCAINE HYDROCHLORIDE 2 ML: 10 INJECTION, SOLUTION EPIDURAL; INFILTRATION; INTRACAUDAL; PERINEURAL at 13:45

## 2019-07-16 NOTE — PROGRESS NOTES
7/16/2019    John Paul Cisneros is here today for worsening knee pain. Pt has undergone injection of the knee in the past with good resolution of symptoms. Pt is requesting a repeat injection.     KNEE Injection Procedure Note:    Large Joint Arthrocentesis: L knee  Date/Time: 7/16/2019 1:45 PM  Consent given by: patient  Site marked: site marked  Timeout: Immediately prior to procedure a time out was called to verify the correct patient, procedure, equipment, support staff and site/side marked as required   Supporting Documentation  Indications: pain   Procedure Details  Location: knee - L knee  Preparation: Patient was prepped and draped in the usual sterile fashion  Needle size: 22 G  Approach: anterolateral  Medications administered: 2 mL lidocaine PF 1% 1 %; 80 mg methylPREDNISolone acetate 80 MG/ML  Patient tolerance: patient tolerated the procedure well with no immediate complications      Prior to injection risks were discussed including pain, infection, low blood sugar.  Patient verbalized understanding would like to proceed with injection    At the conclusion of the injection I discussed the importance of continued quad strengthening exercises on a daily basis. I will see the patient back if the symptoms should fail to improve or worsen.    Joanne Ortiz APRN  7/16/2019

## 2019-08-05 ENCOUNTER — OFFICE VISIT (OUTPATIENT)
Dept: GASTROENTEROLOGY | Facility: CLINIC | Age: 64
End: 2019-08-05

## 2019-08-05 VITALS
DIASTOLIC BLOOD PRESSURE: 78 MMHG | BODY MASS INDEX: 41.75 KG/M2 | WEIGHT: 315 LBS | SYSTOLIC BLOOD PRESSURE: 144 MMHG | HEIGHT: 73 IN | RESPIRATION RATE: 18 BRPM

## 2019-08-05 DIAGNOSIS — K21.00 GASTROESOPHAGEAL REFLUX DISEASE WITH ESOPHAGITIS: ICD-10-CM

## 2019-08-05 DIAGNOSIS — M48.061 SPINAL STENOSIS OF LUMBAR REGION, UNSPECIFIED WHETHER NEUROGENIC CLAUDICATION PRESENT: ICD-10-CM

## 2019-08-05 DIAGNOSIS — R07.89 OTHER CHEST PAIN: Primary | ICD-10-CM

## 2019-08-05 DIAGNOSIS — Z86.010 PERSONAL HISTORY OF COLONIC POLYPS: ICD-10-CM

## 2019-08-05 PROCEDURE — 99203 OFFICE O/P NEW LOW 30 MIN: CPT | Performed by: INTERNAL MEDICINE

## 2019-08-05 RX ORDER — SUCRALFATE 1 G/1
1 TABLET ORAL 4 TIMES DAILY
Qty: 120 TABLET | Refills: 3 | Status: SHIPPED | OUTPATIENT
Start: 2019-08-05

## 2019-08-27 PROBLEM — R07.89 OTHER CHEST PAIN: Status: ACTIVE | Noted: 2019-08-27

## 2019-09-12 ENCOUNTER — ANESTHESIA EVENT (OUTPATIENT)
Dept: PERIOP | Facility: HOSPITAL | Age: 64
End: 2019-09-12

## 2019-09-13 ENCOUNTER — HOSPITAL ENCOUNTER (OUTPATIENT)
Facility: HOSPITAL | Age: 64
Setting detail: HOSPITAL OUTPATIENT SURGERY
Discharge: HOME | End: 2019-09-13
Attending: INTERNAL MEDICINE | Admitting: INTERNAL MEDICINE

## 2019-09-13 ENCOUNTER — ANESTHESIA (OUTPATIENT)
Dept: PERIOP | Facility: HOSPITAL | Age: 64
End: 2019-09-13

## 2019-09-13 VITALS
HEIGHT: 72 IN | OXYGEN SATURATION: 94 % | TEMPERATURE: 98.6 F | WEIGHT: 315 LBS | SYSTOLIC BLOOD PRESSURE: 159 MMHG | BODY MASS INDEX: 42.66 KG/M2 | HEART RATE: 59 BPM | DIASTOLIC BLOOD PRESSURE: 80 MMHG | RESPIRATION RATE: 16 BRPM

## 2019-09-13 DIAGNOSIS — R07.89 OTHER CHEST PAIN: ICD-10-CM

## 2019-09-13 PROCEDURE — 43239 EGD BIOPSY SINGLE/MULTIPLE: CPT | Performed by: INTERNAL MEDICINE

## 2019-09-13 PROCEDURE — 25010000002 PROPOFOL 10 MG/ML EMULSION: Performed by: NURSE ANESTHETIST, CERTIFIED REGISTERED

## 2019-09-13 PROCEDURE — 88305 TISSUE EXAM BY PATHOLOGIST: CPT | Performed by: INTERNAL MEDICINE

## 2019-09-13 RX ORDER — SODIUM CHLORIDE 9 MG/ML
40 INJECTION, SOLUTION INTRAVENOUS AS NEEDED
Status: DISCONTINUED | OUTPATIENT
Start: 2019-09-13 | End: 2019-09-13 | Stop reason: HOSPADM

## 2019-09-13 RX ORDER — SODIUM CHLORIDE 0.9 % (FLUSH) 0.9 %
1-10 SYRINGE (ML) INJECTION AS NEEDED
Status: DISCONTINUED | OUTPATIENT
Start: 2019-09-13 | End: 2019-09-13 | Stop reason: HOSPADM

## 2019-09-13 RX ORDER — SODIUM CHLORIDE, SODIUM LACTATE, POTASSIUM CHLORIDE, CALCIUM CHLORIDE 600; 310; 30; 20 MG/100ML; MG/100ML; MG/100ML; MG/100ML
9 INJECTION, SOLUTION INTRAVENOUS CONTINUOUS
Status: DISCONTINUED | OUTPATIENT
Start: 2019-09-13 | End: 2019-09-13 | Stop reason: HOSPADM

## 2019-09-13 RX ORDER — LIDOCAINE HYDROCHLORIDE 10 MG/ML
0.5 INJECTION, SOLUTION EPIDURAL; INFILTRATION; INTRACAUDAL; PERINEURAL ONCE AS NEEDED
Status: COMPLETED | OUTPATIENT
Start: 2019-09-13 | End: 2019-09-13

## 2019-09-13 RX ORDER — ONDANSETRON 2 MG/ML
4 INJECTION INTRAMUSCULAR; INTRAVENOUS ONCE AS NEEDED
Status: COMPLETED | OUTPATIENT
Start: 2019-09-13 | End: 2019-11-12

## 2019-09-13 RX ORDER — LIDOCAINE HYDROCHLORIDE 20 MG/ML
INJECTION, SOLUTION INFILTRATION; PERINEURAL AS NEEDED
Status: DISCONTINUED | OUTPATIENT
Start: 2019-09-13 | End: 2019-09-13 | Stop reason: SURG

## 2019-09-13 RX ORDER — SODIUM CHLORIDE, SODIUM LACTATE, POTASSIUM CHLORIDE, CALCIUM CHLORIDE 600; 310; 30; 20 MG/100ML; MG/100ML; MG/100ML; MG/100ML
100 INJECTION, SOLUTION INTRAVENOUS CONTINUOUS
Status: ACTIVE | OUTPATIENT
Start: 2019-09-13

## 2019-09-13 RX ORDER — SODIUM CHLORIDE 0.9 % (FLUSH) 0.9 %
3 SYRINGE (ML) INJECTION EVERY 12 HOURS SCHEDULED
Status: DISCONTINUED | OUTPATIENT
Start: 2019-09-13 | End: 2019-09-13 | Stop reason: HOSPADM

## 2019-09-13 RX ORDER — PROPOFOL 10 MG/ML
VIAL (ML) INTRAVENOUS AS NEEDED
Status: DISCONTINUED | OUTPATIENT
Start: 2019-09-13 | End: 2019-09-13 | Stop reason: SURG

## 2019-09-13 RX ADMIN — PROPOFOL 40 MG: 10 INJECTION, EMULSION INTRAVENOUS at 09:55

## 2019-09-13 RX ADMIN — LIDOCAINE HYDROCHLORIDE 0.1 ML: 10 INJECTION, SOLUTION EPIDURAL; INFILTRATION; INTRACAUDAL; PERINEURAL at 09:30

## 2019-09-13 RX ADMIN — PROPOFOL 40 MG: 10 INJECTION, EMULSION INTRAVENOUS at 10:01

## 2019-09-13 RX ADMIN — LIDOCAINE HYDROCHLORIDE 100 MG: 20 INJECTION, SOLUTION INFILTRATION; PERINEURAL at 09:53

## 2019-09-13 RX ADMIN — PROPOFOL 40 MG: 10 INJECTION, EMULSION INTRAVENOUS at 09:59

## 2019-09-13 RX ADMIN — PROPOFOL 40 MG: 10 INJECTION, EMULSION INTRAVENOUS at 09:57

## 2019-09-13 RX ADMIN — SODIUM CHLORIDE, POTASSIUM CHLORIDE, SODIUM LACTATE AND CALCIUM CHLORIDE 9 ML/HR: 600; 310; 30; 20 INJECTION, SOLUTION INTRAVENOUS at 09:31

## 2019-09-13 RX ADMIN — PROPOFOL 50 MG: 10 INJECTION, EMULSION INTRAVENOUS at 09:53

## 2019-09-13 NOTE — BRIEF OP NOTE
ESOPHAGOGASTRODUODENOSCOPY  Progress Note    John Paul Cisneros  9/13/2019    Pre-op Diagnosis:   Other chest pain [R07.89]       Post-Op Diagnosis Codes:     * Other chest pain [R07.89]     * Gastritis [K29.70]     * Reflux esophagitis [K21.0]    Procedure/CPT® Codes:      Procedure(s):  ESOPHAGOGASTRODUODENOSCOPY    Surgeon(s):  Felton Bone MD    Anesthesia: Monitored Anesthesia Care    Staff:   Circulator: Anum Pedraza RN  Scrub Person: Anahy Landis    Estimated Blood Loss: none    Urine Voided: * No values recorded between 9/13/2019  9:48 AM and 9/13/2019 10:03 AM *    Specimens:                Specimens     ID Source Type Tests Collected By Collected At Frozen?      A Gastric, Body Tissue · TISSUE PATHOLOGY EXAM   Felton Bone MD 9/13/19 1000      Description: GASTRIC BIOPSY    B Esophagus, Distal Tissue · TISSUE PATHOLOGY EXAM   Felton Bone MD 9/13/19 1001      Description: DISTAL ESOPHAGUS BIOPSY                Drains:      Findings: Normal Duodenum  Mild Atrophic Gastritis-Biopsy  Reflux Esophagitis-Biopsy    Complications: None      Felton Bone MD     Date: 9/13/2019  Time: 10:05 AM

## 2019-09-13 NOTE — H&P
Patient Care Team:  Igor Maher MD as PCP - General  Igor Maher MD as PCP - Family Medicine    CHIEF COMPLAINT: NCCP    HISTORY OF PRESENT ILLNESS:    No dysphagia and is compliant with QDay PPI      Past Medical History:   Diagnosis Date   • Arthritis    • DVT (deep venous thrombosis) (CMS/HCC)    • Elevated cholesterol    • Hypertension    • PE (pulmonary thromboembolism) (CMS/HCC)    • PONV (postoperative nausea and vomiting)    • Sleep apnea      Past Surgical History:   Procedure Laterality Date   • CARPAL TUNNEL RELEASE  1978   • CHOLECYSTECTOMY  02/2014   • COLONOSCOPY     • HAMMER TOE REPAIR  2000   • HERNIA REPAIR     • HIP BIPOLAR REPLACEMENT Right 10/2010   • JOINT REPLACEMENT     • KNEE ARTHROSCOPY  10/2014   • THYROID SURGERY       Family History   Problem Relation Age of Onset   • Hypertension Other    • Colon cancer Other      Social History     Tobacco Use   • Smoking status: Never Smoker   • Smokeless tobacco: Never Used   Substance Use Topics   • Alcohol use: No   • Drug use: No     Medications Prior to Admission   Medication Sig Dispense Refill Last Dose   • AMLODIPINE BESYLATE PO Take 10 mg by mouth.   9/12/2019 at Unknown time   • atorvastatin (LIPITOR) 80 MG tablet Take 80 mg by mouth Daily.   9/12/2019 at Unknown time   • febuxostat (ULORIC) 40 MG tablet Take 40 mg by mouth Daily.   9/12/2019 at Unknown time   • omeprazole (priLOSEC) 40 MG capsule Take 40 mg by mouth Daily.   9/12/2019 at Unknown time   • Testosterone (AXIRON) 30 MG/ACT solution Place  on the skin.   9/13/2019 at Unknown time   • aspirin (KAY ASPIRIN EC LOW DOSE) 81 MG EC tablet Take 81 mg by mouth Daily.   9/8/2019   • meloxicam (MOBIC) 15 MG tablet Take 15 mg by mouth Daily.   9/8/2019   • sildenafil (REVATIO) 20 MG tablet Take 20 mg by mouth 3 (Three) Times a Day.   8/27/2019   • sucralfate (CARAFATE) 1 g tablet Take 1 tablet by mouth 4 (Four) Times a Day. 120 tablet 3 9/8/2019     Allergies:  Darvon  "[propoxyphene]; Oxycodone; and Adhesive tape    REVIEW OF SYSTEMS:  Please see the above history of present illness for pertinent positives and negatives.  The remainder of the patient's systems have been reviewed and are negative.     Vital Signs  Temp:  [98.5 °F (36.9 °C)] 98.5 °F (36.9 °C)  Heart Rate:  [67-68] 68  Resp:  [12-16] 12  BP: (183)/(95) 183/95    Flowsheet Rows      First Filed Value   Admission Height  182.9 cm (72\") Documented at 09/11/2019 1640   Admission Weight  147 kg (325 lb)  (Abnormal)  Documented at 09/11/2019 1640           Physical Exam:  Physical Exam   Constitutional: Patient appears well-developed and well-nourished and in no acute distress   HEENT:   Head: Normocephalic and atraumatic.   Eyes:  Pupils are equal, round, and reactive to light. EOM are intact. Sclerae are anicteric and non-injected.  Mouth and Throat: Patient has moist mucous membranes. Oropharynx is clear of any erythema or exudate.     Neck: Neck supple. No JVD present. No thyromegaly present. No lymphadenopathy present.  Cardiovascular: Regular rate, regular rhythm, S1 normal and S2 normal.  Exam reveals no gallop and no friction rub.  No murmur heard.  Pulmonary/Chest: Lungs are clear to auscultation bilaterally. No respiratory distress. No wheezes. No rhonchi. No rales.   Abdominal: Soft. Bowel sounds are normal. No distension and no mass. There is no hepatosplenomegaly. There is no tenderness.   Musculoskeletal: Normal Muscle tone  Extremities: No edema. Pulses are palpable in all 4 extremities.  Neurological: Patient is alert and oriented to person, place, and time. Cranial nerves II-XII are grossly intact with no focal deficits.  Skin: Skin is warm. No rash noted. Nails show no clubbing.  No cyanosis or erythema.    Debilities/Disabilities Identified: None  Emotional Behavior: Appropriate     Results Review:    I reviewed the patient's new clinical results.  Lab Results (most recent)     None          Imaging " Results (most recent)     None        reviewed    ECG/EMG Results (most recent)     None        reviewed    Assessment/Plan     NCCP / EGD    I discussed the patients findings and my recommendations with patient.     Felton Bone MD  09/13/19  9:51 AM    Time: 10 min prior to procedure.

## 2019-09-13 NOTE — ANESTHESIA PREPROCEDURE EVALUATION
Anesthesia Evaluation     Patient summary reviewed and Nursing notes reviewed   no history of anesthetic complications:  NPO Solid Status: > 8 hours  NPO Liquid Status: > 8 hours           Airway   Mallampati: II  TM distance: >3 FB  Neck ROM: full  No difficulty expected  Dental - normal exam     Pulmonary - normal exam    breath sounds clear to auscultation  (+) pulmonary embolism (2014), sleep apnea on CPAP,   Cardiovascular - normal exam  Exercise tolerance: good (4-7 METS)    ECG reviewed  Rhythm: regular  Rate: normal    (+) hypertension, DVT resolved, hyperlipidemia,     ROS comment: EKG 7/10/19:  - ABNORMAL ECG -  Sinus rhythm  Probable left ventricular hypertrophy  NO SIGNIFICANT CHANGE FROM PREVIOUS ECG    CP ruled out in ED a few weeks ago and suspicious of PUD    Neuro/Psych- negative ROS  GI/Hepatic/Renal/Endo    (+) morbid obesity, GERD,      Musculoskeletal     (+) back pain,       ROS comment: Spinal stenosis  Abdominal   (+) obese,    Substance History - negative use     OB/GYN          Other   (+) blood dyscrasia, arthritis     ROS/Med Hx Other: Aspirin last on 9/8/19                Anesthesia Plan    ASA 2     MAC     intravenous induction   Anesthetic plan, all risks, benefits, and alternatives have been provided, discussed and informed consent has been obtained with: patient.  Use of blood products discussed with patient  Consented to blood products.

## 2019-09-13 NOTE — ANESTHESIA POSTPROCEDURE EVALUATION
Patient: John Paul Cisneros    Procedure Summary     Date:  09/13/19 Room / Location:  [facility] ENDOSCOPY 1 /  [facility]    Anesthesia Start:  0948 Anesthesia Stop:  1005    Procedure:  ESOPHAGOGASTRODUODENOSCOPY (N/A Esophagus) Diagnosis:       Other chest pain      Gastritis      Reflux esophagitis      (Other chest pain [R07.89])    Surgeon:  Felton Bone MD Provider:  Hermila Sutton CRNA    Anesthesia Type:  MAC ASA Status:  2          Anesthesia Type: MAC  Last vitals  BP   159/80 (09/13/19 1040)   Temp   98.6 °F (37 °C) (09/13/19 1009)   Pulse   59 (09/13/19 1040)   Resp   16 (09/13/19 1040)     SpO2   94 % (09/13/19 1040)     Post Anesthesia Care and Evaluation    Patient location during evaluation: PHASE II  Patient participation: complete - patient participated  Level of consciousness: awake and alert  Pain score: 0  Pain management: adequate  Airway patency: patent  Anesthetic complications: No anesthetic complications  PONV Status: none  Cardiovascular status: acceptable  Respiratory status: acceptable  Hydration status: acceptable

## 2019-09-13 NOTE — OP NOTE
ESOPHAGOGASTRODUODENOSCOPY  Procedure Report    Patient Name:  John Paul Cisneros  YOB: 1955    Date of Surgery:  9/13/2019     Indications:  NCCP    Pre-op Diagnosis:   Other chest pain [R07.89]    Post-Op Diagnosis Codes:     * Other chest pain [R07.89]     * Gastritis [K29.70]     * Reflux esophagitis [K21.0]         Procedure/CPT® Codes:      Procedure(s):  ESOPHAGOGASTRODUODENOSCOPY    Staff:  Surgeon(s):  Felton Bone MD         Anesthesia: Monitored Anesthesia Care    Estimated Blood Loss: none    Specimens:   ID Type Source Tests Collected by Time   A : GASTRIC BIOPSY Tissue Gastric, Body TISSUE PATHOLOGY EXAM Felton Bone MD 9/13/2019 1000   B : DISTAL ESOPHAGUS BIOPSY Tissue Esophagus, Distal TISSUE PATHOLOGY EXAM Felton Bone MD 9/13/2019 1001       Implants:    Nothing was implanted during the procedure      Description of Procedure: After having signed informed consent, he was brought to the endoscopy suite and placed in the left lateral decubitus position and given his IV sedation. A bite block was placed between his incisors. The scope was introduced into the oropharynx and advanced under direct visualization into the esophagus, through the distal esophagus. The Z-line was moderately irregular. There was no active ulcer or stricture. The scope was advanced into the stomach and retroflexed revealing a normal cardia and fundus. The scope was deretroflexed and advanced to the antrum. There was an atrophic appearance to the posterior wall and mild patchy erythema in the antrum but no erosions and no ulcers. The scope was advanced through the pylorus to the duodenal bulb, which was examined and normal, around the angle to 2nd and 3rd portion of the duodenum revealing normal duodenal mucosa and a duodenal diverticulum. The scope was withdrawn back into the antrum, withdrawn slowly examining the posterior wall and greater curvature. No mucosa  lesions were noted there. Biopsies were taken of the antrum to rule out H. pylori. The scope was withdrawn into the distal esophagus and biopsies were taken of the gastric tongues to rule out Cabrera's esophagus. As the scope was withdrawn, the remainder of the esophagus was normal appearing. The scope was taken from the patient, who tolerated the procedure very well.             Findings:  Normal Duodenum  Mild Atrophic Gastritis-Biopsy  Reflux Esophagitis-Biopsy    Complications: None    Recommendations: Continue Same meds and will call with results      Felton Bone MD     Date: 9/13/2019  Time: 10:06 AM

## 2019-09-16 LAB
CYTO UR: NORMAL
LAB AP CASE REPORT: NORMAL
PATH REPORT.FINAL DX SPEC: NORMAL
PATH REPORT.GROSS SPEC: NORMAL

## 2019-10-09 ENCOUNTER — OFFICE VISIT (OUTPATIENT)
Dept: ORTHOPEDIC SURGERY | Facility: CLINIC | Age: 64
End: 2019-10-09

## 2019-10-09 VITALS — HEIGHT: 72 IN | TEMPERATURE: 97.8 F | WEIGHT: 315 LBS | BODY MASS INDEX: 42.66 KG/M2

## 2019-10-09 DIAGNOSIS — R52 PAIN: ICD-10-CM

## 2019-10-09 DIAGNOSIS — M20.11 HALLUX VALGUS OF RIGHT FOOT: ICD-10-CM

## 2019-10-09 DIAGNOSIS — M20.41 HAMMER TOE OF RIGHT FOOT: Primary | ICD-10-CM

## 2019-10-09 DIAGNOSIS — M77.41 METATARSALGIA OF RIGHT FOOT: ICD-10-CM

## 2019-10-09 PROCEDURE — 73630 X-RAY EXAM OF FOOT: CPT | Performed by: ORTHOPAEDIC SURGERY

## 2019-10-09 PROCEDURE — 99204 OFFICE O/P NEW MOD 45 MIN: CPT | Performed by: ORTHOPAEDIC SURGERY

## 2019-10-09 RX ORDER — CEFAZOLIN SODIUM IN 0.9 % NACL 3 G/100 ML
3 INTRAVENOUS SOLUTION, PIGGYBACK (ML) INTRAVENOUS ONCE
Status: CANCELLED | OUTPATIENT
Start: 2019-11-12 | End: 2019-10-09

## 2019-10-09 NOTE — PROGRESS NOTES
"   New Patient Complaint      Patient: John Paul Cisneros  YOB: 1955 64 y.o. male  Medical Record Number: [id number]    Chief Complaints: My toes hurt    History of Present Illness:   Patient reports a chronic history of 20 years of moderate constant aching pain mainly in his lesser toes second third and fourth more so than the fifth with cock-up deformity and mild intermittent discomfort beneath this second and third metatarsal heads.  His toes rub in his shoes but is not having ulceration or breakdown but has some chronic callus over the distal aspect of the third toe.  There is been slight abutment of his first toe to his second but really no pain around the first MTP joint.    He does report that he noticed some type of a skin tag or \"mole\" in the flexion crease of his fourth toe that has not really been particularly painful to him       He had his left foot operated on by Dr. Stapleton in 2001 with IP joint fusion of the great toe for clawing as well as second third fourth hammertoe corrections.    HPI    Allergies:   Allergies   Allergen Reactions   • Darvon [Propoxyphene] Other (See Comments)     Floating in the clouds   • Oxycodone Other (See Comments)     Cramps   • Adhesive Tape        Medications:   Current Outpatient Medications on File Prior to Visit   Medication Sig   • AMLODIPINE BESYLATE PO Take 10 mg by mouth.   • aspirin (KAY ASPIRIN EC LOW DOSE) 81 MG EC tablet Take 81 mg by mouth Daily.   • atorvastatin (LIPITOR) 80 MG tablet Take 80 mg by mouth Daily.   • febuxostat (ULORIC) 40 MG tablet Take 40 mg by mouth Daily.   • meloxicam (MOBIC) 15 MG tablet Take 15 mg by mouth Daily.   • omeprazole (priLOSEC) 40 MG capsule Take 40 mg by mouth Daily.   • sildenafil (REVATIO) 20 MG tablet Take 20 mg by mouth 3 (Three) Times a Day.   • sucralfate (CARAFATE) 1 g tablet Take 1 tablet by mouth 4 (Four) Times a Day.   • Testosterone (AXIRON) 30 MG/ACT solution Place  on the skin.     Current " "Facility-Administered Medications on File Prior to Visit   Medication   • lactated ringers infusion   • ondansetron (ZOFRAN) injection 4 mg       Past Medical History:   Diagnosis Date   • Arthritis    • DVT (deep venous thrombosis) (CMS/HCC)    • Elevated cholesterol    • Hypertension    • PE (pulmonary thromboembolism) (CMS/HCC)    • PONV (postoperative nausea and vomiting)    • Sleep apnea      Past Surgical History:   Procedure Laterality Date   • CARPAL TUNNEL RELEASE  1978   • CHOLECYSTECTOMY  02/2014   • COLONOSCOPY     • ENDOSCOPY N/A 9/13/2019    Procedure: ESOPHAGOGASTRODUODENOSCOPY;  Surgeon: Felton Bone MD;  Location: [facility];  Service: Gastroenterology   • HAMMER TOE REPAIR  2000   • HERNIA REPAIR     • HIP BIPOLAR REPLACEMENT Right 10/2010   • JOINT REPLACEMENT     • KNEE ARTHROSCOPY  10/2014   • THYROID SURGERY       Social History     Occupational History   • Not on file   Tobacco Use   • Smoking status: Never Smoker   • Smokeless tobacco: Never Used   Substance and Sexual Activity   • Alcohol use: No   • Drug use: No   • Sexual activity: Defer      Social History     Social History Narrative   • Not on file     Family History   Problem Relation Age of Onset   • Hypertension Other    • Colon cancer Other        Review of Systems: 14 point review of systems performed, positive pertinent findings identified in HPI. All remaining systems negative     Review of Systems      Physical Exam:   Vitals:    10/09/19 0912   Temp: 97.8 °F (36.6 °C)   TempSrc: Temporal   Weight: (!) 147 kg (325 lb)   Height: 182.9 cm (72\")   PainSc:   5   PainLoc: Foot     Physical Exam   Constitutional: pleasant, well developed he is with his wife Sugey whom I have operated on in the past   Eyes: sclera non icteric  Hearing : adequate for exam  Cardiovascular: palpable pulses in right foot, right calf/ thigh NT without sign of DVT  Respiratoy: breathing unlabored   Neurological: grossly sensate to LT throughout " right LE  Psychiatric: oriented with normal mood and affect.   Lymphatic: No palpable popliteal lymphadenopathy right LE  Skin: intact throughout right leg/foot.  There is somewhat of a fleshy skin type protrusion from the plantar aspect of the right fourth toe flexion crease without significant discomfort to palpation  Musculoskeletal: Right foot shows neutral alignment of the first MTP without discomfort but there is some valgus to the proximal phalanx.  There is no clawing.    There is significant cock-up deformity of the second and third more so than fourth and fifth toes with sniffing and hammering of the PIP joints.  There is a small callus over the tip of the third toe but no erythema or evidence of ulceration.  Mild discomfort beneath the second and third metatarsal heads but no callus.  No focal discomfort of the dorsum of the midfoot  Physical Exam  Ortho Exam    Radiology: 3 views of the right foot ordered evaluate pain reviewed and no paroxysmal for comparison there is elongation of the second and third metatarsals with significant cock-up deformity of the second third fourth and fifth with hammering at the PIP joints.  Neutral alignment of the first MTP joint.  There is arthritic change of the midfoot especially of the naviculocuneiform and talonavicular articulations.    Assessment/Plan: 1.  Right second third fourth and fifth hammertoes with second and third metatarsalgia  2.  Right foot arthritis at the navicular cuneiform and talonavicular articulations-asymptomatic  3.  Right hallux valgus interphalangeus    We discussed operative and nonoperative treatment options although no guarantees could be given she like to proceed with operative treatment.    I do not think he needs a formal bunion correction but would need to do a Yonas osteotomy to realign the first toe better to get it out of the way of the lesser toes.    We will plan to do this as well as second third fourth and fifth metatarsal  phalangeal joint release with second and third metatarsal shortening osteotomies and PIP fusions of the lesser toes.    He voiced clear understanding the operative procedure and postoperative course including pins coming out of his toes for at least 3 to 4 weeks.    Risks benefits potential outcomes and complications were reviewed and can include but are not limited to heart attack stroke death pneumonia infection bleeding damage to blood vessels nerves or tendons blood clots pulmonary embolism persistent or worsening pain stiffness malunion nonunion and recurrence of deformity as well as loss of the toes and wound healing complications.    All of he and his wife's questions answered to their full satisfaction.    I recommended that he see a dermatologist about that area of skin beneath his fourth toe prior to surgery to make sure that is addressed appropriately.    He had a pleasant visit they will call if have any questions prior to surgery.

## 2019-10-16 ENCOUNTER — OFFICE VISIT (OUTPATIENT)
Dept: SLEEP MEDICINE | Facility: HOSPITAL | Age: 64
End: 2019-10-16
Attending: INTERNAL MEDICINE

## 2019-10-16 VITALS — BODY MASS INDEX: 42.66 KG/M2 | WEIGHT: 315 LBS | HEIGHT: 72 IN

## 2019-10-16 DIAGNOSIS — G47.33 OSA ON CPAP: ICD-10-CM

## 2019-10-16 DIAGNOSIS — G47.14 HYPERSOMNIA DUE TO ANOTHER MEDICAL CONDITION: ICD-10-CM

## 2019-10-16 DIAGNOSIS — E66.01 CLASS 3 SEVERE OBESITY DUE TO EXCESS CALORIES WITH SERIOUS COMORBIDITY AND BODY MASS INDEX (BMI) OF 40.0 TO 44.9 IN ADULT (HCC): ICD-10-CM

## 2019-10-16 DIAGNOSIS — G47.22 CIRCADIAN RHYTHM SLEEP DISORDER, ADVANCED SLEEP PHASE TYPE: Primary | ICD-10-CM

## 2019-10-16 DIAGNOSIS — Z99.89 OSA ON CPAP: ICD-10-CM

## 2019-10-16 PROCEDURE — 99213 OFFICE O/P EST LOW 20 MIN: CPT | Performed by: INTERNAL MEDICINE

## 2019-10-16 PROCEDURE — G0463 HOSPITAL OUTPT CLINIC VISIT: HCPCS

## 2019-10-16 NOTE — PROGRESS NOTES
"Follow Up [facility] Note     Chief Complaint:  ANNA     Primary Care Physician: Igor Maher MD    Interval History:   I last saw the patient one year ago.  He is stable without new complaints.  He goes to bed at 8 PM and awakens at 3:15 AM.  Even though retired this is his habit.  He might lay back down until 7:45 AM.  He will use the restroom during the nighttime.  Francisco Sleepiness Scale is abnormal at 14    Review of Systems:    A complete review of systems was done and all were negative with the exception of the above    Social History:    Social History     Socioeconomic History   • Marital status:      Spouse name: Not on file   • Number of children: Not on file   • Years of education: Not on file   • Highest education level: Not on file   Tobacco Use   • Smoking status: Never Smoker   • Smokeless tobacco: Never Used   Substance and Sexual Activity   • Alcohol use: No   • Drug use: No   • Sexual activity: Defer       Allergies:  Darvon [propoxyphene]; Oxycodone; and Adhesive tape     Medication Review: His list was reviewed.      Vital Signs:    Vitals:    10/16/19 0825   Weight: (!) 149 kg (328 lb)   Height: 182.9 cm (72\")     Body mass index is 44.48 kg/m².    Physical Exam:    Constitutional:  Well developed 64 y.o. male that appears in no apparent distress.  Awake & oriented times 3.  Normal mood with normal recent and remote memory and normal judgement.  Eyes:  Conjunctivae normal.  Oropharynx:  moist mucous membranes without exudate and a normal sized tongue and class III MP airway     Results Review:  DME is [facility] and he uses a nasal interface.  Downloads between 7/18 and 10/15/2019 compliance 100%.  Average usage is 7 hours and 29 minutes.  Average AHI is normal without leak.  Average AutoCPAP pressure is 13.4 and his auto CPAP is 12-20.       Impression:   Obstructive sleep apnea adequately treated with auto CPAP with good compliance and usage and some persistent complaints " of hypersomnolence.  The patient also has circadian rhythm sleep disorder, advanced sleep phase type.    Plan:  Good sleep hygiene measures should be maintained.  Weight loss would be beneficial in this patient who is morbidly obese by BMI.  The patient is benefiting from the treatment being provided.     The patient will continue auto CPAP and a new prescription will be sent to his DME    The patient will call for any problems and will follow up in 1 year.      John Abernathy MD  Sleep Medicine  10/16/19  8:27 AM

## 2019-11-05 ENCOUNTER — APPOINTMENT (OUTPATIENT)
Dept: PREADMISSION TESTING | Facility: HOSPITAL | Age: 64
End: 2019-11-05

## 2019-11-05 VITALS
SYSTOLIC BLOOD PRESSURE: 141 MMHG | WEIGHT: 315 LBS | DIASTOLIC BLOOD PRESSURE: 75 MMHG | OXYGEN SATURATION: 97 % | HEIGHT: 73 IN | HEART RATE: 60 BPM | RESPIRATION RATE: 16 BRPM | BODY MASS INDEX: 41.75 KG/M2 | TEMPERATURE: 98.5 F

## 2019-11-05 LAB
ANION GAP SERPL CALCULATED.3IONS-SCNC: 11.1 MMOL/L (ref 5–15)
BUN BLD-MCNC: 17 MG/DL (ref 8–23)
BUN/CREAT SERPL: 19.1 (ref 7–25)
CALCIUM SPEC-SCNC: 8.7 MG/DL (ref 8.6–10.5)
CHLORIDE SERPL-SCNC: 107 MMOL/L (ref 98–107)
CO2 SERPL-SCNC: 23.9 MMOL/L (ref 22–29)
CREAT BLD-MCNC: 0.89 MG/DL (ref 0.76–1.27)
DEPRECATED RDW RBC AUTO: 43.1 FL (ref 37–54)
ERYTHROCYTE [DISTWIDTH] IN BLOOD BY AUTOMATED COUNT: 13.8 % (ref 12.3–15.4)
GFR SERPL CREATININE-BSD FRML MDRD: 86 ML/MIN/1.73
GLUCOSE BLD-MCNC: 115 MG/DL (ref 65–99)
HCT VFR BLD AUTO: 45.8 % (ref 37.5–51)
HGB BLD-MCNC: 15 G/DL (ref 13–17.7)
MCH RBC QN AUTO: 27.9 PG (ref 26.6–33)
MCHC RBC AUTO-ENTMCNC: 32.8 G/DL (ref 31.5–35.7)
MCV RBC AUTO: 85.1 FL (ref 79–97)
PLATELET # BLD AUTO: 338 10*3/MM3 (ref 140–450)
PMV BLD AUTO: 9.3 FL (ref 6–12)
POTASSIUM BLD-SCNC: 4.2 MMOL/L (ref 3.5–5.2)
RBC # BLD AUTO: 5.38 10*6/MM3 (ref 4.14–5.8)
SODIUM BLD-SCNC: 142 MMOL/L (ref 136–145)
WBC NRBC COR # BLD: 9.69 10*3/MM3 (ref 3.4–10.8)

## 2019-11-05 PROCEDURE — 93005 ELECTROCARDIOGRAM TRACING: CPT

## 2019-11-05 PROCEDURE — 93010 ELECTROCARDIOGRAM REPORT: CPT | Performed by: INTERNAL MEDICINE

## 2019-11-05 PROCEDURE — 80048 BASIC METABOLIC PNL TOTAL CA: CPT | Performed by: ORTHOPAEDIC SURGERY

## 2019-11-05 PROCEDURE — 85027 COMPLETE CBC AUTOMATED: CPT | Performed by: ORTHOPAEDIC SURGERY

## 2019-11-05 PROCEDURE — 36415 COLL VENOUS BLD VENIPUNCTURE: CPT

## 2019-11-05 NOTE — DISCHARGE INSTRUCTIONS
Take the following medications the morning of surgery with a small sip of water:    AMLODIPINE  OMEPRAZOLE    ARRIVE AT 0545.        General Instructions:  • Do not eat solid food after midnight the night before surgery.  • You may drink clear liquids day of surgery but must stop at least one hour before your hospital arrival time.  • It is beneficial for you to have a clear drink that contains carbohydrates the day of surgery.  We suggest a 12 to 20 ounce bottle of Gatorade or Powerade for non-diabetic patients or a 12 to 20 ounce bottle of G2 or Powerade Zero for diabetic patients. (Pediatric patients, are not advised to drink a 12 to 20 ounce carbohydrate drink)    Clear liquids are liquids you can see through.  Nothing red in color.     Plain water                               Sports drinks  Sodas                                   Gelatin (Jell-O)  Fruit juices without pulp such as white grape juice and apple juice  Popsicles that contain no fruit or yogurt  Tea or coffee (no cream or milk added)  Gatorade / Powerade  G2 / Powerade Zero    • Infants may have breast milk up to four hours before surgery.  • Infants drinking formula may drink formula up to six hours before surgery.   • Patients who avoid smoking, chewing tobacco and alcohol for 4 weeks prior to surgery have a reduced risk of post-operative complications.  Quit smoking as many days before surgery as you can.  • Do not smoke, use chewing tobacco or drink alcohol the day of surgery.   • If applicable bring your C-PAP/ BI-PAP machine.  • Bring any papers given to you in the doctor’s office.  • Wear clean comfortable clothes.  • Do not wear contact lenses, false eyelashes or make-up.  Bring a case for your glasses.   • Bring crutches or walker if applicable.  • Remove all piercings.  Leave jewelry and any other valuables at home.  • Hair extensions with metal clips must be removed prior to surgery.  • The Pre-Admission Testing nurse will instruct you  to bring medications if unable to obtain an accurate list in Pre-Admission Testing.        If you were given a blood bank ID arm band remember to bring it with you the day of surgery.    Preventing a Surgical Site Infection:  • For 2 to 3 days before surgery, avoid shaving with a razor because the razor can irritate skin and make it easier to develop an infection.    • Any areas of open skin can increase the risk of a post-operative wound infection by allowing bacteria to enter and travel throughout the body.  Notify your surgeon if you have any skin wounds / rashes even if it is not near the expected surgical site.  The area will need assessed to determine if surgery should be delayed until it is healed.  • The night prior to surgery sleep in a clean bed with clean clothing.  Do not allow pets to sleep with you.  • Shower on the morning of surgery using a fresh bar of anti-bacterial soap (such as Dial) and clean washcloth.  Dry with a clean towel and dress in clean clothing.  • Ask your surgeon if you will be receiving antibiotics prior to surgery.  • Make sure you, your family, and all healthcare providers clean their hands with soap and water or an alcohol based hand  before caring for you or your wound.    Day of surgery:  Your arrival time is approximately two hours before your scheduled surgery time.  Upon arrival, a Pre-op nurse and Anesthesiologist will review your health history, obtain vital signs, and answer questions you may have.  The only belongings needed at this time will be a list of your home medications and if applicable your C-PAP/BI-PAP machine.  If you are staying overnight your family can leave the rest of your belongings in the car and bring them to your room later.  A Pre-op nurse will start an IV and you may receive medication in preparation for surgery, including something to help you relax.  Your family will be able to see you in the Pre-op area.  Two visitors at a time will be  allowed in the Pre-op room.  While you are in surgery your family should notify the waiting room  if they leave the waiting room area and provide a contact phone number.    Please be aware that surgery does come with discomfort.  We want to make every effort to control your discomfort so please discuss any uncontrolled symptoms with your nurse.   Your doctor will most likely have prescribed pain medications.      If you are going home after surgery you will receive individualized written care instructions before being discharged.  A responsible adult must drive you to and from the hospital on the day of your surgery and stay with you for 24 hours.    If you are staying overnight following surgery, you will be transported to your hospital room following the recovery period.  [facility] has all private rooms.    You have received a list of surgical assistants for your reference.  If you have any questions please call Pre-Admission Testing at [phone].  Deductibles and co-payments are collected on the day of service. Please be prepared to pay the required co-pay, deductible or deposit on the day of service as defined by your plan.

## 2019-11-11 ENCOUNTER — OFFICE VISIT (OUTPATIENT)
Dept: GASTROENTEROLOGY | Facility: CLINIC | Age: 64
End: 2019-11-11

## 2019-11-11 VITALS
BODY MASS INDEX: 41.75 KG/M2 | WEIGHT: 315 LBS | DIASTOLIC BLOOD PRESSURE: 78 MMHG | SYSTOLIC BLOOD PRESSURE: 142 MMHG | HEIGHT: 73 IN

## 2019-11-11 DIAGNOSIS — Z86.010 PERSONAL HISTORY OF COLONIC POLYPS: ICD-10-CM

## 2019-11-11 DIAGNOSIS — Z80.0 FAMILY HISTORY OF COLON CANCER: ICD-10-CM

## 2019-11-11 DIAGNOSIS — K22.70 BARRETT'S ESOPHAGUS WITHOUT DYSPLASIA: Primary | ICD-10-CM

## 2019-11-11 PROCEDURE — 99212 OFFICE O/P EST SF 10 MIN: CPT | Performed by: INTERNAL MEDICINE

## 2019-11-11 NOTE — PROGRESS NOTES
PATIENT INFORMATION  John Paul Cisneros       - 1955    CHIEF COMPLAINT  Chief Complaint   Patient presents with   • Follow-up     8 week follow up on chest pain       HISTORY OF PRESENT ILLNESS  Here for F/U of [facility] did have focal Cabrera's and is asymptomatic  on daily PPI     No other issues and reviewed lifestyle changes and will see as needed.             REVIEW OF SYSTEMS  Review of Systems   All other systems reviewed and are negative.        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]   • Hypersomnia due to another medical condition [G47.14]   • Class 3 severe obesity due to excess calories with serious comorbidity and body mass index (BMI) of 40.0 to 44.9 in adult (CMS/HCC) [E66.01, Z68.41]   • ANNA on auto CPAP [G47.33, Z99.89]         PAST MEDICAL HISTORY  Past Medical History:   Diagnosis Date   • Arthritis    • Colon polyp    • DVT (deep venous thrombosis) (CMS/[facility])    • Elevated cholesterol    • GERD (gastroesophageal reflux disease)    • Hypertension    • PE (pulmonary thromboembolism) (CMS/[facility])    • PONV (postoperative nausea and vomiting)    • Sleep apnea          SURGICAL HISTORY  Past Surgical History:   Procedure Laterality Date   • CARPAL TUNNEL RELEASE     • CHOLECYSTECTOMY  2014   • COLONOSCOPY     • ENDOSCOPY N/A 2019    Procedure: ESOPHAGOGASTRODUODENOSCOPY;  Surgeon: Felton Bone MD;  Location: [facility];  Service: Gastroenterology   • HAMMER TOE REPAIR     • HERNIA REPAIR     • HIP BIPOLAR REPLACEMENT Right 10/2010   • JOINT REPLACEMENT     • KNEE ARTHROSCOPY  10/2014   • THYROID SURGERY           FAMILY HISTORY  Family History   Problem Relation Age of  "Onset   • Hypertension Other    • Colon cancer Other    • Colon cancer Brother    • Colon polyps Brother    • Malig Hyperthermia Neg Hx          SOCIAL HISTORY  Social History     Occupational History   • Not on file   Tobacco Use   • Smoking status: Never Smoker   • Smokeless tobacco: Never Used   Substance and Sexual Activity   • Alcohol use: No   • Drug use: No   • Sexual activity: Defer       Debilities/Disabilities Identified: None    Emotional Behavior: Appropriate    CURRENT MEDICATIONS    Current Outpatient Medications:   •  AMLODIPINE BESYLATE PO, Take 10 mg by mouth Daily., Disp: , Rfl:   •  aspirin (KAY ASPIRIN EC LOW DOSE) 81 MG EC tablet, Take 81 mg by mouth Daily. HOLD PER MD INSTR, Disp: , Rfl:   •  atorvastatin (LIPITOR) 80 MG tablet, Take 40 mg by mouth Daily., Disp: , Rfl:   •  febuxostat (ULORIC) 40 MG tablet, Take 40 mg by mouth Daily., Disp: , Rfl:   •  meloxicam (MOBIC) 15 MG tablet, Take 15 mg by mouth Daily. HOLD PER MD INSTR, Disp: , Rfl:   •  omeprazole (priLOSEC) 40 MG capsule, Take 20 mg by mouth Daily., Disp: , Rfl:   •  sucralfate (CARAFATE) 1 g tablet, Take 1 tablet by mouth 4 (Four) Times a Day. (Patient taking differently: Take 1 g by mouth 4 (Four) Times a Day As Needed.), Disp: 120 tablet, Rfl: 3  •  Testosterone (AXIRON) 30 MG/ACT solution, Apply  topically to the appropriate area as directed. ONE PUMP DAILY, Disp: , Rfl:   No current facility-administered medications for this visit.     Facility-Administered Medications Ordered in Other Visits:   •  lactated ringers infusion, 100 mL/hr, Intravenous, Continuous, Hermila Sutton CRNA  •  ondansetron (ZOFRAN) injection 4 mg, 4 mg, Intravenous, Once PRN, Hermila Sutton CRNA    ALLERGIES  Darvon [propoxyphene]; Oxycodone; and Adhesive tape    VITALS  Vitals:    11/11/19 1554   BP: 142/78   Weight: (!) 150 kg (331 lb)   Height: 185.4 cm (72.99\")       LAST RESULTS   Appointment on 11/05/2019   Component Date Value Ref Range Status "   • Glucose 11/05/2019 115* 65 - 99 mg/dL Final   • BUN 11/05/2019 17  8 - 23 mg/dL Final   • Creatinine 11/05/2019 0.89  0.76 - 1.27 mg/dL Final   • Sodium 11/05/2019 142  136 - 145 mmol/L Final   • Potassium 11/05/2019 4.2  3.5 - 5.2 mmol/L Final   • Chloride 11/05/2019 107  98 - 107 mmol/L Final   • CO2 11/05/2019 23.9  22.0 - 29.0 mmol/L Final   • Calcium 11/05/2019 8.7  8.6 - 10.5 mg/dL Final   • eGFR Non African Amer 11/05/2019 86  >60 mL/min/1.73 Final   • BUN/Creatinine Ratio 11/05/2019 19.1  7.0 - 25.0 Final   • Anion Gap 11/05/2019 11.1  5.0 - 15.0 mmol/L Final   • WBC 11/05/2019 9.69  3.40 - 10.80 10*3/mm3 Final   • RBC 11/05/2019 5.38  4.14 - 5.80 10*6/mm3 Final   • Hemoglobin 11/05/2019 15.0  13.0 - 17.7 g/dL Final   • Hematocrit 11/05/2019 45.8  37.5 - 51.0 % Final   • MCV 11/05/2019 85.1  79.0 - 97.0 fL Final   • MCH 11/05/2019 27.9  26.6 - 33.0 pg Final   • MCHC 11/05/2019 32.8  31.5 - 35.7 g/dL Final   • RDW 11/05/2019 13.8  12.3 - 15.4 % Final   • RDW-SD 11/05/2019 43.1  37.0 - 54.0 fl Final   • MPV 11/05/2019 9.3  6.0 - 12.0 fL Final   • Platelets 11/05/2019 338  140 - 450 10*3/mm3 Final     No results found.    PHYSICAL EXAM  Physical Exam   Constitutional: He is oriented to person, place, and time. He appears well-developed and well-nourished.   HENT:   Head: Normocephalic and atraumatic.   Eyes: Conjunctivae and EOM are normal. Pupils are equal, round, and reactive to light. No scleral icterus.   Neck: Normal range of motion. Neck supple. No thyromegaly present.   Cardiovascular: Normal rate, regular rhythm, normal heart sounds and intact distal pulses. Exam reveals no gallop.   No murmur heard.  Pulmonary/Chest: Effort normal and breath sounds normal. He has no wheezes. He has no rales.   Abdominal: Soft. Bowel sounds are normal. He exhibits no shifting dullness, no distension, no fluid wave, no abdominal bruit, no ascites and no mass. There is no hepatosplenomegaly. There is no tenderness.  There is no guarding and negative Parra's sign. Hernia confirmed negative in the ventral area.   Musculoskeletal: Normal range of motion. He exhibits no edema.   Lymphadenopathy:     He has no cervical adenopathy.   Neurological: He is alert and oriented to person, place, and time.   Skin: Skin is warm and dry. No rash noted. He is not diaphoretic. No erythema.   Psychiatric: He has a normal mood and affect. His behavior is normal.       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

## 2019-11-11 NOTE — H&P
"   Patient: John Paul Cisneros  YOB: 1955 64 y.o. male  Medical Record Number: [id number]     Chief Complaints: My toes hurt     History of Present Illness:   Patient reports a chronic history of 20 years of moderate constant aching pain mainly in his lesser toes second third and fourth more so than the fifth with cock-up deformity and mild intermittent discomfort beneath this second and third metatarsal heads.  His toes rub in his shoes but is not having ulceration or breakdown but has some chronic callus over the distal aspect of the third toe.  There is been slight abutment of his first toe to his second but really no pain around the first MTP joint.     He does report that he noticed some type of a skin tag or \"mole\" in the flexion crease of his fourth toe that has not really been particularly painful to him        He had his left foot operated on by Dr. Stapleton in 2001 with IP joint fusion of the great toe for clawing as well as second third fourth hammertoe corrections.     HPI     Allergies:         Allergies   Allergen Reactions   • Darvon [Propoxyphene] Other (See Comments)       Floating in the clouds   • Oxycodone Other (See Comments)       Cramps   • Adhesive Tape           Medications:        Current Outpatient Medications on File Prior to Visit   Medication Sig   • AMLODIPINE BESYLATE PO Take 10 mg by mouth.   • aspirin (KAY ASPIRIN EC LOW DOSE) 81 MG EC tablet Take 81 mg by mouth Daily.   • atorvastatin (LIPITOR) 80 MG tablet Take 80 mg by mouth Daily.   • febuxostat (ULORIC) 40 MG tablet Take 40 mg by mouth Daily.   • meloxicam (MOBIC) 15 MG tablet Take 15 mg by mouth Daily.   • omeprazole (priLOSEC) 40 MG capsule Take 40 mg by mouth Daily.   • sildenafil (REVATIO) 20 MG tablet Take 20 mg by mouth 3 (Three) Times a Day.   • sucralfate (CARAFATE) 1 g tablet Take 1 tablet by mouth 4 (Four) Times a Day.   • Testosterone (AXIRON) 30 MG/ACT solution Place  on the skin.          Current " "Facility-Administered Medications on File Prior to Visit   Medication   • lactated ringers infusion   • ondansetron (ZOFRAN) injection 4 mg         Medical History        Past Medical History:   Diagnosis Date   • Arthritis     • DVT (deep venous thrombosis) (CMS/HCC)     • Elevated cholesterol     • Hypertension     • PE (pulmonary thromboembolism) (CMS/HCC)     • PONV (postoperative nausea and vomiting)     • Sleep apnea           Surgical History         Past Surgical History:   Procedure Laterality Date   • CARPAL TUNNEL RELEASE   1978   • CHOLECYSTECTOMY   02/2014   • COLONOSCOPY       • ENDOSCOPY N/A 9/13/2019     Procedure: ESOPHAGOGASTRODUODENOSCOPY;  Surgeon: Felton Bone MD;  Location: [facility];  Service: Gastroenterology   • HAMMER TOE REPAIR   2000   • HERNIA REPAIR       • HIP BIPOLAR REPLACEMENT Right 10/2010   • JOINT REPLACEMENT       • KNEE ARTHROSCOPY   10/2014   • THYROID SURGERY             Social History           Occupational History   • Not on file   Tobacco Use   • Smoking status: Never Smoker   • Smokeless tobacco: Never Used   Substance and Sexual Activity   • Alcohol use: No   • Drug use: No   • Sexual activity: Defer      Social History          Social History Narrative   • Not on file            Family History   Problem Relation Age of Onset   • Hypertension Other     • Colon cancer Other           Review of Systems: 14 point review of systems performed, positive pertinent findings identified in HPI. All remaining systems negative      Review of Systems        Physical Exam:   Vitals       Vitals:     10/09/19 0912   Temp: 97.8 °F (36.6 °C)   TempSrc: Temporal   Weight: (!) 147 kg (325 lb)   Height: 182.9 cm (72\")   PainSc:   5   PainLoc: Foot         Physical Exam   Constitutional: pleasant, well developed he is with his wife Sugey whom I have operated on in the past   Eyes: sclera non icteric  Hearing : adequate for exam  Cardiovascular: palpable pulses in right foot, " right calf/ thigh NT without sign of DVT  Respiratoy: breathing unlabored   Neurological: grossly sensate to LT throughout right LE  Psychiatric: oriented with normal mood and affect.   Lymphatic: No palpable popliteal lymphadenopathy right LE  Skin: intact throughout right leg/foot.  There is somewhat of a fleshy skin type protrusion from the plantar aspect of the right fourth toe flexion crease without significant discomfort to palpation  Musculoskeletal: Right foot shows neutral alignment of the first MTP without discomfort but there is some valgus to the proximal phalanx.  There is no clawing.     There is significant cock-up deformity of the second and third more so than fourth and fifth toes with significant hammering of the PIP joints.  There is a small callus over the tip of the third toe but no erythema or evidence of ulceration.  Mild discomfort beneath the second and third metatarsal heads but no callus.  No focal discomfort of the dorsum of the midfoot  Physical Exam  Ortho Exam     Radiology: 3 views of the right foot ordered evaluate pain reviewed and no paroxysmal for comparison there is elongation of the second and third metatarsals with significant cock-up deformity of the second third fourth and fifth with hammering at the PIP joints.  Neutral alignment of the first MTP joint.  There is arthritic change of the midfoot especially of the naviculocuneiform and talonavicular articulations.     Assessment/Plan: 1.  Right second third fourth and fifth hammertoes with second and third metatarsalgia  2.  Right foot arthritis at the navicular cuneiform and talonavicular articulations-asymptomatic  3.  Right hallux valgus interphalangeus     We discussed operative and nonoperative treatment options although no guarantees could be given she like to proceed with operative treatment.     I do not think he needs a formal bunion correction but would need to do a Pike osteotomy to realign the first toe better to  get it out of the way of the lesser toes.     We will plan to do this as well as second third fourth and fifth metatarsal phalangeal joint release with second and third metatarsal shortening osteotomies and PIP fusions of the lesser toes.     He voiced clear understanding the operative procedure and postoperative course including pins coming out of his toes for at least 3 to 4 weeks.     Risks benefits potential outcomes and complications were reviewed and can include but are not limited to heart attack stroke death pneumonia infection bleeding damage to blood vessels nerves or tendons blood clots pulmonary embolism persistent or worsening pain stiffness malunion nonunion and recurrence of deformity as well as loss of the toes and wound healing complications.     All of he and his wife's questions answered to their full satisfaction.     I recommended that he see a dermatologist about that area of skin beneath his fourth toe prior to surgery to make sure that is addressed appropriately.     He had a pleasant visit they will call if have any questions prior to surgery.

## 2019-11-12 ENCOUNTER — HOSPITAL ENCOUNTER (OUTPATIENT)
Facility: HOSPITAL | Age: 64
Setting detail: HOSPITAL OUTPATIENT SURGERY
Discharge: HOME | End: 2019-11-12
Attending: ORTHOPAEDIC SURGERY | Admitting: ORTHOPAEDIC SURGERY

## 2019-11-12 ENCOUNTER — ANESTHESIA EVENT (OUTPATIENT)
Dept: PERIOP | Facility: HOSPITAL | Age: 64
End: 2019-11-12

## 2019-11-12 ENCOUNTER — APPOINTMENT (OUTPATIENT)
Dept: GENERAL RADIOLOGY | Facility: HOSPITAL | Age: 64
End: 2019-11-12

## 2019-11-12 ENCOUNTER — ANESTHESIA (OUTPATIENT)
Dept: PERIOP | Facility: HOSPITAL | Age: 64
End: 2019-11-12

## 2019-11-12 VITALS
HEART RATE: 91 BPM | DIASTOLIC BLOOD PRESSURE: 74 MMHG | SYSTOLIC BLOOD PRESSURE: 138 MMHG | RESPIRATION RATE: 16 BRPM | OXYGEN SATURATION: 92 % | BODY MASS INDEX: 42.74 KG/M2 | WEIGHT: 315 LBS | TEMPERATURE: 98 F

## 2019-11-12 DIAGNOSIS — M20.41 HAMMER TOE OF RIGHT FOOT: ICD-10-CM

## 2019-11-12 DIAGNOSIS — M77.41 METATARSALGIA OF RIGHT FOOT: ICD-10-CM

## 2019-11-12 DIAGNOSIS — M20.11 HALLUX VALGUS OF RIGHT FOOT: ICD-10-CM

## 2019-11-12 PROCEDURE — 28310 REVISION OF BIG TOE: CPT | Performed by: ORTHOPAEDIC SURGERY

## 2019-11-12 PROCEDURE — C1713 ANCHOR/SCREW BN/BN,TIS/BN: HCPCS | Performed by: ORTHOPAEDIC SURGERY

## 2019-11-12 PROCEDURE — 25010000002 ROPIVACAINE PER 1 MG: Performed by: ANESTHESIOLOGY

## 2019-11-12 PROCEDURE — 25010000002 PHENYLEPHRINE PER 1 ML: Performed by: NURSE ANESTHETIST, CERTIFIED REGISTERED

## 2019-11-12 PROCEDURE — 25010000002 ONDANSETRON PER 1 MG: Performed by: NURSE ANESTHETIST, CERTIFIED REGISTERED

## 2019-11-12 PROCEDURE — 25010000002 FENTANYL CITRATE (PF) 100 MCG/2ML SOLUTION: Performed by: ANESTHESIOLOGY

## 2019-11-12 PROCEDURE — 25010000002 PROPOFOL 10 MG/ML EMULSION: Performed by: NURSE ANESTHETIST, CERTIFIED REGISTERED

## 2019-11-12 PROCEDURE — 28285 REPAIR OF HAMMERTOE: CPT | Performed by: ORTHOPAEDIC SURGERY

## 2019-11-12 PROCEDURE — 76000 FLUOROSCOPY <1 HR PHYS/QHP: CPT

## 2019-11-12 PROCEDURE — 28270 RELEASE OF FOOT CONTRACTURE: CPT | Performed by: ORTHOPAEDIC SURGERY

## 2019-11-12 PROCEDURE — 28308 INCISION OF METATARSAL: CPT | Performed by: ORTHOPAEDIC SURGERY

## 2019-11-12 PROCEDURE — 25010000002 MIDAZOLAM PER 1 MG: Performed by: ANESTHESIOLOGY

## 2019-11-12 PROCEDURE — 25010000002 KETOROLAC TROMETHAMINE PER 15 MG: Performed by: NURSE ANESTHETIST, CERTIFIED REGISTERED

## 2019-11-12 PROCEDURE — 25010000002 DEXAMETHASONE PER 1 MG: Performed by: ANESTHESIOLOGY

## 2019-11-12 PROCEDURE — C1713 ANCHOR/SCREW BN/BN,TIS/BN: HCPCS

## 2019-11-12 PROCEDURE — 73620 X-RAY EXAM OF FOOT: CPT

## 2019-11-12 DEVICE — IMPLANTABLE DEVICE: Type: IMPLANTABLE DEVICE | Site: TOES | Status: FUNCTIONAL

## 2019-11-12 DEVICE — KWIRE SMOTH DBL/TROC .054X4IN: Type: IMPLANTABLE DEVICE | Site: TOES | Status: FUNCTIONAL

## 2019-11-12 DEVICE — KWIRE SMOTH DBL/TROC .062X4IN: Type: IMPLANTABLE DEVICE | Site: TOES | Status: FUNCTIONAL

## 2019-11-12 DEVICE — SCRW QUICKFIX 2X12MM MAGENTA: Type: IMPLANTABLE DEVICE | Site: TOES | Status: FUNCTIONAL

## 2019-11-12 RX ORDER — ACETAMINOPHEN 650 MG/1
650 SUPPOSITORY RECTAL ONCE AS NEEDED
Status: DISCONTINUED | OUTPATIENT
Start: 2019-11-12 | End: 2019-11-12 | Stop reason: HOSPADM

## 2019-11-12 RX ORDER — ACETAMINOPHEN 325 MG/1
650 TABLET ORAL ONCE AS NEEDED
Status: DISCONTINUED | OUTPATIENT
Start: 2019-11-12 | End: 2019-11-12 | Stop reason: HOSPADM

## 2019-11-12 RX ORDER — PROMETHAZINE HYDROCHLORIDE 25 MG/ML
6.25 INJECTION, SOLUTION INTRAMUSCULAR; INTRAVENOUS ONCE AS NEEDED
Status: DISCONTINUED | OUTPATIENT
Start: 2019-11-12 | End: 2019-11-12 | Stop reason: HOSPADM

## 2019-11-12 RX ORDER — CEFAZOLIN SODIUM IN 0.9 % NACL 3 G/100 ML
3 INTRAVENOUS SOLUTION, PIGGYBACK (ML) INTRAVENOUS ONCE
Status: COMPLETED | OUTPATIENT
Start: 2019-11-12 | End: 2019-11-12

## 2019-11-12 RX ORDER — SODIUM CHLORIDE 0.9 % (FLUSH) 0.9 %
3-10 SYRINGE (ML) INJECTION AS NEEDED
Status: DISCONTINUED | OUTPATIENT
Start: 2019-11-12 | End: 2019-11-12 | Stop reason: HOSPADM

## 2019-11-12 RX ORDER — DIPHENHYDRAMINE HYDROCHLORIDE 50 MG/ML
12.5 INJECTION INTRAMUSCULAR; INTRAVENOUS
Status: DISCONTINUED | OUTPATIENT
Start: 2019-11-12 | End: 2019-11-12 | Stop reason: HOSPADM

## 2019-11-12 RX ORDER — LIDOCAINE HYDROCHLORIDE 10 MG/ML
0.5 INJECTION, SOLUTION EPIDURAL; INFILTRATION; INTRACAUDAL; PERINEURAL ONCE AS NEEDED
Status: DISCONTINUED | OUTPATIENT
Start: 2019-11-12 | End: 2019-11-12 | Stop reason: HOSPADM

## 2019-11-12 RX ORDER — ROPIVACAINE HYDROCHLORIDE 5 MG/ML
INJECTION, SOLUTION EPIDURAL; INFILTRATION; PERINEURAL
Status: COMPLETED | OUTPATIENT
Start: 2019-11-12 | End: 2019-11-12

## 2019-11-12 RX ORDER — FENTANYL CITRATE 50 UG/ML
50 INJECTION, SOLUTION INTRAMUSCULAR; INTRAVENOUS
Status: DISCONTINUED | OUTPATIENT
Start: 2019-11-12 | End: 2019-11-12 | Stop reason: HOSPADM

## 2019-11-12 RX ORDER — FLUMAZENIL 0.1 MG/ML
0.2 INJECTION INTRAVENOUS AS NEEDED
Status: DISCONTINUED | OUTPATIENT
Start: 2019-11-12 | End: 2019-11-12 | Stop reason: HOSPADM

## 2019-11-12 RX ORDER — CEPHALEXIN 500 MG/1
500 CAPSULE ORAL EVERY 6 HOURS
Qty: 8 CAPSULE | Refills: 0 | Status: SHIPPED | OUTPATIENT
Start: 2019-11-12 | End: 2019-11-14

## 2019-11-12 RX ORDER — PROMETHAZINE HYDROCHLORIDE 25 MG/1
25 TABLET ORAL ONCE AS NEEDED
Status: DISCONTINUED | OUTPATIENT
Start: 2019-11-12 | End: 2019-11-12 | Stop reason: HOSPADM

## 2019-11-12 RX ORDER — HYDRALAZINE HYDROCHLORIDE 20 MG/ML
5 INJECTION INTRAMUSCULAR; INTRAVENOUS
Status: DISCONTINUED | OUTPATIENT
Start: 2019-11-12 | End: 2019-11-12 | Stop reason: HOSPADM

## 2019-11-12 RX ORDER — ENALAPRILAT 2.5 MG/2ML
0.62 INJECTION INTRAVENOUS ONCE AS NEEDED
Status: DISCONTINUED | OUTPATIENT
Start: 2019-11-12 | End: 2019-11-12 | Stop reason: HOSPADM

## 2019-11-12 RX ORDER — DIPHENHYDRAMINE HCL 25 MG
25 CAPSULE ORAL
Status: DISCONTINUED | OUTPATIENT
Start: 2019-11-12 | End: 2019-11-12 | Stop reason: HOSPADM

## 2019-11-12 RX ORDER — GLYCOPYRROLATE 0.2 MG/ML
INJECTION INTRAMUSCULAR; INTRAVENOUS AS NEEDED
Status: DISCONTINUED | OUTPATIENT
Start: 2019-11-12 | End: 2019-11-12 | Stop reason: SURG

## 2019-11-12 RX ORDER — FAMOTIDINE 10 MG/ML
20 INJECTION, SOLUTION INTRAVENOUS ONCE
Status: COMPLETED | OUTPATIENT
Start: 2019-11-12 | End: 2019-11-12

## 2019-11-12 RX ORDER — NALOXONE HCL 0.4 MG/ML
0.2 VIAL (ML) INJECTION AS NEEDED
Status: DISCONTINUED | OUTPATIENT
Start: 2019-11-12 | End: 2019-11-12 | Stop reason: HOSPADM

## 2019-11-12 RX ORDER — BUPIVACAINE HYDROCHLORIDE 2.5 MG/ML
INJECTION, SOLUTION EPIDURAL; INFILTRATION; INTRACAUDAL
Status: COMPLETED | OUTPATIENT
Start: 2019-11-12 | End: 2019-11-12

## 2019-11-12 RX ORDER — EPHEDRINE SULFATE 50 MG/ML
INJECTION, SOLUTION INTRAVENOUS AS NEEDED
Status: DISCONTINUED | OUTPATIENT
Start: 2019-11-12 | End: 2019-11-12 | Stop reason: SURG

## 2019-11-12 RX ORDER — PROPOFOL 10 MG/ML
VIAL (ML) INTRAVENOUS AS NEEDED
Status: DISCONTINUED | OUTPATIENT
Start: 2019-11-12 | End: 2019-11-12 | Stop reason: SURG

## 2019-11-12 RX ORDER — MIDAZOLAM HYDROCHLORIDE 1 MG/ML
1 INJECTION INTRAMUSCULAR; INTRAVENOUS
Status: DISCONTINUED | OUTPATIENT
Start: 2019-11-12 | End: 2019-11-12 | Stop reason: HOSPADM

## 2019-11-12 RX ORDER — ASPIRIN 325 MG
325 TABLET, DELAYED RELEASE (ENTERIC COATED) ORAL EVERY 12 HOURS
Qty: 60 TABLET | Refills: 1 | Status: SHIPPED | OUTPATIENT
Start: 2019-11-13 | End: 2022-12-08

## 2019-11-12 RX ORDER — PROMETHAZINE HYDROCHLORIDE 25 MG/1
25 SUPPOSITORY RECTAL ONCE AS NEEDED
Status: DISCONTINUED | OUTPATIENT
Start: 2019-11-12 | End: 2019-11-12 | Stop reason: HOSPADM

## 2019-11-12 RX ORDER — SCOLOPAMINE TRANSDERMAL SYSTEM 1 MG/1
1 PATCH, EXTENDED RELEASE TRANSDERMAL CONTINUOUS
Status: DISCONTINUED | OUTPATIENT
Start: 2019-11-12 | End: 2019-11-12

## 2019-11-12 RX ORDER — HYDROCODONE BITARTRATE AND ACETAMINOPHEN 7.5; 325 MG/1; MG/1
1 TABLET ORAL ONCE AS NEEDED
Status: DISCONTINUED | OUTPATIENT
Start: 2019-11-12 | End: 2019-11-12 | Stop reason: HOSPADM

## 2019-11-12 RX ORDER — PROMETHAZINE HYDROCHLORIDE 25 MG/ML
12.5 INJECTION, SOLUTION INTRAMUSCULAR; INTRAVENOUS ONCE AS NEEDED
Status: DISCONTINUED | OUTPATIENT
Start: 2019-11-12 | End: 2019-11-12 | Stop reason: HOSPADM

## 2019-11-12 RX ORDER — PROMETHAZINE HYDROCHLORIDE 25 MG/ML
6.25 INJECTION, SOLUTION INTRAMUSCULAR; INTRAVENOUS
Status: DISCONTINUED | OUTPATIENT
Start: 2019-11-12 | End: 2019-11-12 | Stop reason: HOSPADM

## 2019-11-12 RX ORDER — EPHEDRINE SULFATE 50 MG/ML
5 INJECTION, SOLUTION INTRAVENOUS ONCE AS NEEDED
Status: DISCONTINUED | OUTPATIENT
Start: 2019-11-12 | End: 2019-11-12 | Stop reason: HOSPADM

## 2019-11-12 RX ORDER — ONDANSETRON 2 MG/ML
4 INJECTION INTRAMUSCULAR; INTRAVENOUS ONCE AS NEEDED
Status: DISCONTINUED | OUTPATIENT
Start: 2019-11-12 | End: 2019-11-12 | Stop reason: HOSPADM

## 2019-11-12 RX ORDER — DEXAMETHASONE SODIUM PHOSPHATE 4 MG/ML
INJECTION, SOLUTION INTRA-ARTICULAR; INTRALESIONAL; INTRAMUSCULAR; INTRAVENOUS; SOFT TISSUE
Status: COMPLETED | OUTPATIENT
Start: 2019-11-12 | End: 2019-11-12

## 2019-11-12 RX ORDER — KETOROLAC TROMETHAMINE 30 MG/ML
INJECTION, SOLUTION INTRAMUSCULAR; INTRAVENOUS AS NEEDED
Status: DISCONTINUED | OUTPATIENT
Start: 2019-11-12 | End: 2019-11-12 | Stop reason: SURG

## 2019-11-12 RX ORDER — MIDAZOLAM HYDROCHLORIDE 1 MG/ML
2 INJECTION INTRAMUSCULAR; INTRAVENOUS
Status: DISCONTINUED | OUTPATIENT
Start: 2019-11-12 | End: 2019-11-12 | Stop reason: HOSPADM

## 2019-11-12 RX ORDER — LABETALOL HYDROCHLORIDE 5 MG/ML
5 INJECTION, SOLUTION INTRAVENOUS
Status: DISCONTINUED | OUTPATIENT
Start: 2019-11-12 | End: 2019-11-12 | Stop reason: HOSPADM

## 2019-11-12 RX ORDER — SODIUM CHLORIDE, SODIUM LACTATE, POTASSIUM CHLORIDE, CALCIUM CHLORIDE 600; 310; 30; 20 MG/100ML; MG/100ML; MG/100ML; MG/100ML
9 INJECTION, SOLUTION INTRAVENOUS CONTINUOUS
Status: DISCONTINUED | OUTPATIENT
Start: 2019-11-12 | End: 2019-11-12 | Stop reason: HOSPADM

## 2019-11-12 RX ORDER — HYDROCODONE BITARTRATE AND ACETAMINOPHEN 7.5; 325 MG/1; MG/1
1-2 TABLET ORAL EVERY 4 HOURS PRN
Qty: 60 TABLET | Refills: 0 | Status: SHIPPED | OUTPATIENT
Start: 2019-11-12 | End: 2019-11-27

## 2019-11-12 RX ORDER — SODIUM CHLORIDE 0.9 % (FLUSH) 0.9 %
3 SYRINGE (ML) INJECTION EVERY 12 HOURS SCHEDULED
Status: DISCONTINUED | OUTPATIENT
Start: 2019-11-12 | End: 2019-11-12 | Stop reason: HOSPADM

## 2019-11-12 RX ORDER — LIDOCAINE HYDROCHLORIDE 20 MG/ML
INJECTION, SOLUTION INFILTRATION; PERINEURAL AS NEEDED
Status: DISCONTINUED | OUTPATIENT
Start: 2019-11-12 | End: 2019-11-12 | Stop reason: SURG

## 2019-11-12 RX ADMIN — DEXAMETHASONE SODIUM PHOSPHATE 2 MG: 4 INJECTION, SOLUTION INTRAMUSCULAR; INTRAVENOUS at 06:54

## 2019-11-12 RX ADMIN — EPHEDRINE SULFATE 10 MG: 50 INJECTION INTRAMUSCULAR; INTRAVENOUS; SUBCUTANEOUS at 10:07

## 2019-11-12 RX ADMIN — CEFAZOLIN 3 G: 1 INJECTION, POWDER, FOR SOLUTION INTRAMUSCULAR; INTRAVENOUS; PARENTERAL at 07:28

## 2019-11-12 RX ADMIN — HYDROCODONE BITARTRATE AND ACETAMINOPHEN 1 TABLET: 7.5; 325 TABLET ORAL at 11:33

## 2019-11-12 RX ADMIN — ONDANSETRON 4 MG: 2 INJECTION, SOLUTION INTRAMUSCULAR; INTRAVENOUS at 07:44

## 2019-11-12 RX ADMIN — GLYCOPYRROLATE 0.2 MG: 0.2 INJECTION INTRAMUSCULAR; INTRAVENOUS at 07:47

## 2019-11-12 RX ADMIN — KETOROLAC TROMETHAMINE 30 MG: 30 INJECTION, SOLUTION INTRAMUSCULAR; INTRAVENOUS at 08:47

## 2019-11-12 RX ADMIN — PHENYLEPHRINE HYDROCHLORIDE 300 MCG: 10 INJECTION INTRAVENOUS at 10:01

## 2019-11-12 RX ADMIN — EPHEDRINE SULFATE 10 MG: 50 INJECTION INTRAMUSCULAR; INTRAVENOUS; SUBCUTANEOUS at 10:01

## 2019-11-12 RX ADMIN — FENTANYL CITRATE 100 MCG: 50 INJECTION INTRAMUSCULAR; INTRAVENOUS at 11:01

## 2019-11-12 RX ADMIN — MIDAZOLAM 2 MG: 1 INJECTION INTRAMUSCULAR; INTRAVENOUS at 06:40

## 2019-11-12 RX ADMIN — PROPOFOL 250 MG: 10 INJECTION, EMULSION INTRAVENOUS at 07:28

## 2019-11-12 RX ADMIN — PHENYLEPHRINE HYDROCHLORIDE 200 MCG: 10 INJECTION INTRAVENOUS at 09:54

## 2019-11-12 RX ADMIN — FENTANYL CITRATE 50 MCG: 50 INJECTION INTRAMUSCULAR; INTRAVENOUS at 06:40

## 2019-11-12 RX ADMIN — FENTANYL CITRATE 100 MCG: 50 INJECTION INTRAMUSCULAR; INTRAVENOUS at 08:47

## 2019-11-12 RX ADMIN — PHENYLEPHRINE HYDROCHLORIDE 100 MCG: 10 INJECTION INTRAVENOUS at 09:50

## 2019-11-12 RX ADMIN — BUPIVACAINE HYDROCHLORIDE 10 ML: 2.5 INJECTION, SOLUTION EPIDURAL; INFILTRATION; INTRACAUDAL; PERINEURAL at 06:54

## 2019-11-12 RX ADMIN — ROPIVACAINE HYDROCHLORIDE 10 ML: 5 INJECTION, SOLUTION EPIDURAL; INFILTRATION; PERINEURAL at 06:54

## 2019-11-12 RX ADMIN — PHENYLEPHRINE HYDROCHLORIDE 300 MCG: 10 INJECTION INTRAVENOUS at 10:16

## 2019-11-12 RX ADMIN — PHENYLEPHRINE HYDROCHLORIDE 300 MCG: 10 INJECTION INTRAVENOUS at 10:07

## 2019-11-12 RX ADMIN — SODIUM CHLORIDE, POTASSIUM CHLORIDE, SODIUM LACTATE AND CALCIUM CHLORIDE 9 ML/HR: 600; 310; 30; 20 INJECTION, SOLUTION INTRAVENOUS at 06:33

## 2019-11-12 RX ADMIN — FENTANYL CITRATE 50 MCG: 50 INJECTION INTRAMUSCULAR; INTRAVENOUS at 06:43

## 2019-11-12 RX ADMIN — LIDOCAINE HYDROCHLORIDE 40 MG: 20 INJECTION, SOLUTION INFILTRATION; PERINEURAL at 07:28

## 2019-11-12 RX ADMIN — FAMOTIDINE 20 MG: 10 INJECTION INTRAVENOUS at 06:33

## 2019-11-12 RX ADMIN — BUPIVACAINE HYDROCHLORIDE 15 ML: 2.5 INJECTION, SOLUTION EPIDURAL; INFILTRATION; INTRACAUDAL; PERINEURAL at 06:44

## 2019-11-12 RX ADMIN — SODIUM CHLORIDE, POTASSIUM CHLORIDE, SODIUM LACTATE AND CALCIUM CHLORIDE: 600; 310; 30; 20 INJECTION, SOLUTION INTRAVENOUS at 08:48

## 2019-11-12 RX ADMIN — FENTANYL CITRATE 100 MCG: 50 INJECTION INTRAMUSCULAR; INTRAVENOUS at 07:28

## 2019-11-12 RX ADMIN — DEXAMETHASONE SODIUM PHOSPHATE 2 MG: 4 INJECTION, SOLUTION INTRAMUSCULAR; INTRAVENOUS at 06:44

## 2019-11-12 RX ADMIN — ROPIVACAINE HYDROCHLORIDE 15 ML: 5 INJECTION, SOLUTION EPIDURAL; INFILTRATION; PERINEURAL at 06:44

## 2019-11-12 RX ADMIN — FENTANYL CITRATE 100 MCG: 50 INJECTION INTRAMUSCULAR; INTRAVENOUS at 09:27

## 2019-11-12 RX ADMIN — EPHEDRINE SULFATE 10 MG: 50 INJECTION INTRAMUSCULAR; INTRAVENOUS; SUBCUTANEOUS at 09:48

## 2019-11-12 NOTE — BRIEF OP NOTE
TOE INTERPHALANGEAL JOINT/METATARSOPHALANGEAL JOINT FUSION  Progress Note    John Paul Mckoylister  11/12/2019    Pre-op Diagnosis:   Hammer toe of right foot [M20.41]  Metatarsalgia of right foot [M77.41]  Hallux valgus of right foot [M20.11]       Post-Op Diagnosis Codes:     * Hammer toe of right foot [M20.41]     * Metatarsalgia of right foot [M77.41]     * Hallux valgus of right foot [M20.11]    Procedure/CPT® Codes:      Procedure(s):  Right first toe phalangeal osteotomy.  Right second, third, fourth and fifth metatarsal phalangeal joint release with the second and third metatarsal osteotomy and right second third and fourth proximal interphalangeal joint resection and fusion.    Surgeon(s):  Nick Christine MD    Anesthesia: General    Staff:   Circulator: Tiera Waters RN; Robert Erickson RN  Radiology Technologist: Miriam Pearson RRT  Scrub Person: Kaitlin Soni; Darwin Gomez RN    Estimated Blood Loss: 20ml    Urine Voided: * No values recorded between 11/12/2019  7:20 AM and 11/12/2019 11:04 AM *    Specimens:                None     min      Drains:  none    Findings: see dict    Complications: none      Nick Christine MD     Date: 11/12/2019  Time: 11:06 AM

## 2019-11-12 NOTE — ANESTHESIA PROCEDURE NOTES
Airway  Urgency: elective    Date/Time: 11/12/2019 7:31 AM  Airway not difficult    General Information and Staff    Patient location during procedure: OR  Anesthesiologist: Edin Malhotra MD  CRNA: Lala Enriquez CRNA    Indications and Patient Condition  Indications for airway management: airway protection    Preoxygenated: yes  Mask difficulty assessment: 1 - vent by mask    Final Airway Details  Final airway type: supraglottic airway      Successful airway: unique  Size 5    Number of attempts at approach: 1  Assessment: lips, teeth, and gum same as pre-op    Additional Comments  Smooth IV/mask induction and placement of LMA. Atraumatic, lips/teeth/mouth intact, as preop. +ETCO2, bilateral breath sounds and equal.

## 2019-11-12 NOTE — ANESTHESIA PREPROCEDURE EVALUATION
Anesthesia Evaluation     Patient summary reviewed and Nursing notes reviewed   history of anesthetic complications: PONV  NPO Solid Status: > 8 hours  NPO Liquid Status: > 2 hours           Airway   Mallampati: II  TM distance: >3 FB  Neck ROM: full  Dental - normal exam     Pulmonary - normal exam    breath sounds clear to auscultation  (+) pulmonary embolism, sleep apnea on CPAP,   Cardiovascular - normal exam    Rhythm: regular  Rate: normal    (+) hypertension well controlled less than 2 medications, DVT resolved, hyperlipidemia,   (-) angina, orthopnea, PND, BAILON      Neuro/Psych- negative ROS  GI/Hepatic/Renal/Endo    (+) morbid obesity, GERD,      Musculoskeletal     Abdominal    Substance History - negative use     OB/GYN negative ob/gyn ROS         Other   arthritis,                      Anesthesia Plan    ASA 3     general   (Popliteal block for PO pain)  intravenous induction     Anesthetic plan, all risks, benefits, and alternatives have been provided, discussed and informed consent has been obtained with: patient.

## 2019-11-12 NOTE — OP NOTE
Operative Note      Facility: [facility]  Patient Name: John Paul Cisneros  YOB: 1955  Date: 11/12/2019  Medical Record Number: [id number]      Pre-op Diagnosis: 1.  Right hallux valgus interphalangeus  2.  Right second MTP contracture  3.  Right third MTP contracture  4.  Right fourth MTP contracture  5.  Right fifth MTP contracture  6.  Right second metatarsalgia  7.  Right third metatarsalgia  8.  Right second PIP hammertoe  9.  Right third PIP hammertoe  10.  Right fourth PIP hammertoe  11.  Right fifth PIP hammertoe    Post-op Diagnosis:   1.  Right hallux valgus interphalangeus  2.  Right second MTP contracture  3.  Right third MTP contracture  4.  Right fourth MTP contracture  5.  Right fifth MTP contracture  6.  Right second metatarsalgia  7.  Right third metatarsalgia  8.  Right second PIP hammertoe  9.  Right third PIP hammertoe  10.  Right fourth PIP hammertoe  11.  Right fifth PIP hammertoe        Procedure(s):  1.  Right first proximal phalanx Akin osteotomy using Arthrex 13 mm x 10 mm compression staple  2.  Right second MTP release with extensor tendon lengthening and dorsal capsulotomy  3.  Right third MTP release with extensor tendon lengthening and dorsal capsulotomy  4.  Right fourth MTP release with extensor tendon lengthening and dorsal capsulotomy  5.  Right fifth MTP release with extensor tendon lengthening and dorsal capsulotomy  6.  Right second metatarsal shortening Weil osteotomy using Arthrex 12 mm x 2 mm twist off screw  7.  Right third metatarsal shortening Weil osteotomy using Arthrex 12 mm x 2 mm twist off screws  8.  Right second PIP fusion and flexor tenotomy  9.  Right third PIP fusion and flexor tenotomy  10.  Right fourth PIP fusion and flexor tenotomy  11.  Right fifth PIP fusion and flexor tenotomy    Surgeon(s):  Nick Christine MD    Anesthesia: General  Anesthesiologist: Edin Malhotra MD  CRNA: Lala Enriquez CRNA    Staff:    Circulator: Tiera Waters RN; Robert Erickson RN  Radiology Technologist: Miriam Pearson, RRT  Scrub Person: Kaitlin Soni; Darwin Gomez, SAÚL  Assistants : none      Tourniquet time:104 min        Estimated Blood Loss:  20 ml patient has had a history of progressive painful deformity of his lesser toes with MTP contractures and PIP hammertoes with associated metatarsalgia at the second and third.  He is also had a chronic callus over the tip of the third which is not demonstrated sign of infection.  There is also hallux valgus interphalangeus with abutment of the first to the second toe.    We reviewed operative and nonoperative treatment options and although no guarantees can be given mutual decision was made to proceed with operative treatment.  Patient and his family voiced a clear understanding the operative procedure with associated risk benefits potential outcomes and complications.  Drains: None  Specimens: * No orders in the log *  Findings: See Dictation    Complications: None      Indications for Procedure: Preoperative informed consent and anesthesia evaluation were obtained.  IV antibiotics were administered and the surgical site was marked.  Block was ministered by anesthesia.  Patient was brought to the operating room placed in supine position.  Anesthesia was induced and LMA was positioned.  SCDs were placed on the left leg and well-padded tourniquet was placed right proximal thigh.  Right leg was then carefully prepped and draped in a sterile fashion and surgical timeout was performed.    Right foot and leg were exsanguinated and pneumatic tourniquet inflated to turn 50 mmHg.  The medial aspect of the first proximal phalanx was identified and localized radiographically and clinically.  Midline incision was made with care taken to avoid superficial nerve.  Full-thickness flaps were elevated and I exposed the medial dorsal and plantar aspects of the first proximal  phalanx.  A 0.045 mm K wire was passed across the base and a transverse trajectory and confirmed radiographically.  This was then used as a cutting guide to perform an incomplete osteotomy across the base of the proximal phalanx leaving a lateral hinge and protecting the dorsal and plantar extensor and flexor tendons respectively.  The K wire was then removed and a second osteotomy was created 3 to 4 mm distal to this and conversion laterally removing a wedge of bone and leaving a lateral periosteal hinge.  The osteotomy was then greenstick into place and found to be well opposed with no rotational or translational deformity and was subsequently secured with an Arthrex 13 mm x 10 mm compression staple with excellent fixation.  X-ray showed good alignment and containment.  The wound was irrigated copiously    Attention was then turned to the lesser toes.  I began at the second and third and incision was made in the second webspace and full-thickness flaps were elevated.  The extensor tendons to the second were identified and mobilized.  The brevis was resected proximally and longus was lengthened in a Z fashion.  l capsulotomy was performed with capsular release which improved alignment to the second MTP joint assimilated standing position but still with some bony prominence plantarly.    I felt it was necessary to proceed with a shortening osteotomy.  The second metatarsal head was identified and exposed and an oblique osteotomy was made beginning  2 mm plantar to the dorsal edge of the articular surface.  A second osteotomy was made parallel to this removing approximately 1 to 2 mm wafer of bone.  The capital fragment was then translated proximally per preoperative templating and such that the joint was decompressed and secured with an Arthrex 12 mm x 2 mm twist off screw with good compression.  X-ray showed good alignment.    Attention was then turned to the third MTP joint through the same incision but as a  separate procedure the extensor tendons were again mobilized and the brevis was resected proximally and longus was lengthened in a Z fashion and capsular release was performed with improved alignment in assimilated standing position but again with some prominence plantarly.  I then exposed the metatarsal head fully and again performed an identical osteotomy to the second metatarsal shortening  the third in line with the second and again securing this with a 12 mm x 2 mm Arthrex twist off screw with good compression.  There was neutral alignment of the second and third MTP joints in assimilated standing position with no further bony prominence plantarly.    Attention was then turned to the fourth and fifth MTP joints.  Incision was made in the fourth webspace and full-thickness flaps were elevated.  I  exposed and mobilized the extensor tendons to the fourth and the brevis was resected proximally and longus was lengthened in the fashion.  Dorsal capsulotomy was performed and the joint was mobilized.  It had significantly improved alignment in assimilated standing position with no plantar prominence I did not feel shortening osteotomy was necessary.    As a separate procedure through the same incision the fifth extensor tendons were mobilized and the brevis was resected proximally and longus was lengthened in a Z fashion and capsular release was performed with improved alignment of the fifth MTP joint in assimilated standing position with no plantar prominence I did not feel that shortening osteotomy was necessary.    The wounds were irrigated copiously    Attention was then turned to the PIP joints.  Skin and capsular ellipses were removed over each of the second third fourth and fifth PIP joints and the collateral ligaments were released and each proximal phalanx was resected below the condylar flare.  The base of each middle phalanx was denuded of cartilage back to cancellus bone.  Then in order to make sure that  each 1 could be reduced without significant tension I divided the plantar plate and flexor tendons under direct visualization.  I was then able to bring each of them into neutral alignment without undue tension.    Before pending the toes I debrided the callus over the tip of the third toe which was quite hypertrophic and had some old hemorrhage within it but did not identify any evidence of ulceration or purulence.    I then passed 8.062 mm K wire to the base of the second middle phalanx out to the tip of the toe.  I then reduced the PIP joint and then cross pinned the MTP joint with the foot in assimilated standing position without disrupting the osteotomy.  I then performed the same thing for the third using a 0.054 mm K wire.  The MTP joint was well reduced and on x-ray the pin was coming just along the lateral cortex and clinically it was not outside the bone and there was nice alignment of the toe and I did not feel that it was worth repositioning this as we had good alignment and fixation in an appropriate position.  I then pinned the fourth and fifth toes with a 0.054 mm K wire in similar fashion and again pending the MTP joints in assimilated standing position in neutral alignment.  X-ray showed appropriate alignment and clinically the toes are well aligned.    The tourniquet was then released and there was prompt return of capillary refill to all the toes except for the third.  It was difficult to ascertain the amount of capillary refill to the amount of callus over the tip of the toe but did wrap the toe with Nitropaste and warm compresses brought this out of elevation and increase his blood pressure and there appeared to be return of acceptable capillary refill to the toe along the dorsal surface which matched that to the second and along the medial and lateral surfaces which match those of the surrounding toes.  Plantarly there was return of capillary refill up to the level of the callus and I did not  feel that it warranted removal of the pin.    Wounds were all again irrigated copiously with dilute Betadine solution.  The medial and 2 dorsal wounds were each closed with 4-0 Vicryl and 4-0 nylon.  Each of the toe wound was then closed with 4-0 nylon with a central roll stitch incorporating the extensor tendon and medial and lateral interrupted sutures.  The pins were bent clipped and capped and sterile hammertoe bunion foot and ankle spica dressing was applied.  [facility] and [facility] in stable condition.    I discussed all the operative findings in detail with his wife and that we need to keep close eye on the third and if there is any problems to let me know immediately.            Description of Procedure:

## 2019-11-12 NOTE — ANESTHESIA PROCEDURE NOTES
Peripheral Block      Patient reassessed immediately prior to procedure    Patient location during procedure: pre-op  Start time: 11/12/2019 6:47 AM  Stop time: 11/12/2019 6:54 AM  Reason for block: at surgeon's request and post-op pain management  Performed by  Anesthesiologist: Edin Malhotra MD  Preanesthetic Checklist  Completed: patient identified, site marked, surgical consent, pre-op evaluation, timeout performed, IV checked, risks and benefits discussed and monitors and equipment checked  Prep:  Pt Position: supine  Sterile barriers:gloves and cap  Prep: ChloraPrep  Patient monitoring: blood pressure monitoring, continuous pulse oximetry and EKG  Procedure  Sedation:yes    Guidance:ultrasound guided  ULTRASOUND INTERPRETATION.  Using ultrasound guidance a 21 G gauge needle was placed in close proximity to the femoral nerve, at which point, under ultrasound guidance anesthetic was injected in the area of the nerve and spread of the anesthesia was seen on ultrasound in close proximity thereto.  There were no abnormalities seen on ultrasound; a digital image was taken; and the patient tolerated the procedure with no complications. Images:still images obtained    Laterality:right  Block Type:adductor canal block  Injection Technique:single-shot  Needle Type:echogenic  Needle Gauge:21 G  Resistance on Injection: none    Medications Used: ropivacaine (NAROPIN) 0.5 % injection, 10 mL  bupivacaine PF (MARCAINE) 0.25 % injection, 10 mL  dexamethasone (DECADRON) injection, 2 mg  Med admintered at 11/12/2019 6:54 AM      Post Assessment  Injection Assessment: negative aspiration for heme, no paresthesia on injection and incremental injection  Patient Tolerance:comfortable throughout block  Complications:no  Additional Notes  Ultrasound interpretation:  Needle was seen in close proximity to nerve.  Local anesthetic seen surrounding area of nerve.  No abnormalities noted.  Still image obtained.

## 2019-11-12 NOTE — DISCHARGE INSTRUCTIONS
Where there are instructions to use ice, DO NOT USE ICE,  INSTEAD USE HEAT WRAPPED BLANKETS        HYDROCODONE GIVEN IN RECOVERY AT 11:33 AM IN RECOVERY.

## 2019-11-12 NOTE — ANESTHESIA PROCEDURE NOTES
Peripheral Block      Patient reassessed immediately prior to procedure    Patient location during procedure: pre-op  Start time: 11/12/2019 7:37 AM  Stop time: 11/12/2019 7:46 AM  Reason for block: at surgeon's request and post-op pain management  Performed by  Anesthesiologist: Edin Malhotra MD  Preanesthetic Checklist  Completed: patient identified, site marked, surgical consent, pre-op evaluation, timeout performed, IV checked, risks and benefits discussed and monitors and equipment checked  Prep:  Pt Position: left lateral decubitus  Sterile barriers:gloves and cap  Prep: ChloraPrep  Patient monitoring: blood pressure monitoring, continuous pulse oximetry and EKG  Procedure  Sedation:yes    Guidance:ultrasound guided  ULTRASOUND INTERPRETATION.  Using ultrasound guidance a 21 G gauge needle was placed in close proximity to the sciatic nerve, at which point, under ultrasound guidance anesthetic was injected in the area of the nerve and spread of the anesthesia was seen on ultrasound in close proximity thereto.  There were no abnormalities seen on ultrasound; a digital image was taken; and the patient tolerated the procedure with no complications. Images:still images obtained    Laterality:right  Block Type:popliteal  Injection Technique:single-shot  Needle Type:echogenic  Needle Gauge:21 G  Resistance on Injection: none    Medications Used: dexamethasone (DECADRON) injection, 2 mg  ropivacaine (NAROPIN) 0.5 % injection, 15 mL  bupivacaine PF (MARCAINE) 0.25 % injection, 15 mL  Med admintered at 11/12/2019 6:44 AM      Post Assessment  Injection Assessment: negative aspiration for heme, no paresthesia on injection and incremental injection  Patient Tolerance:comfortable throughout block  Complications:no  Additional Notes  Ultrasound interpretation:  Needle was seen in close proximity to nerve.  Local anesthetic seen surrounding area of nerve.  No abnormalities noted.  Still image obtained.

## 2019-11-12 NOTE — INTERVAL H&P NOTE
H&P updated. The patient was examined and He has not had the skin tag removed from the plantar aspect of the fourth toe and the wound is now healed.  This was 4 weeks ago.He has no other changes we will plan to proceed with procedure as previously described.  His wife voiced clear understanding the operative procedure with associated risk benefits potential outcomes and complications including potential loss of the toes.He has had a history of DVT and PE in 2014 after gallbladder surgery but now only takes low-dose aspirin.  He has had his knee replaced since then and was not placed on any formal anticoagulants.  We discussed postoperative anticoagulation given the fact that he will not be immobilized as far as the ability to move his leg that I feel that the risk of anticoagulation outweighs the potential benefit at this point and will have him take a regular strength aspirin every 12 hours starting 24 hours after surgery.  Also discussed postoperative pain management with patient and his wife.  He has tolerated hydrocodone in the past and will use this for postoperative pain control

## 2019-11-12 NOTE — ANESTHESIA POSTPROCEDURE EVALUATION
Patient: John Paul Cisneros    Procedure Summary     Date:  11/12/19 Room / Location:   [facility]  /  [facility]    Anesthesia Start:  0714 Anesthesia Stop:  1116    Procedure:  Right first toe phalangeal osteotomy.  Right second, third, fourth and fifth metatarsal phalangeal joint release with the second and third metatarsal osteotomy and right second third and fourth proximal interphalangeal joint resection and fusion. (Right Toes) Diagnosis:       Hammer toe of right foot      Metatarsalgia of right foot      Hallux valgus of right foot      (Hammer toe of right foot [M20.41])      (Metatarsalgia of right foot [M77.41])      (Hallux valgus of right foot [M20.11])    Surgeon:  Nick Christine MD Provider:  Edin Malhotra MD    Anesthesia Type:  general ASA Status:  3          Anesthesia Type: general  Last vitals  BP   121/78 (11/12/19 1130)   Temp   36.7 °C (98 °F) (11/12/19 1143)   Pulse   79 (11/12/19 1143)   Resp   16 (11/12/19 1143)     SpO2   96 % (11/12/19 1143)     Post Anesthesia Care and Evaluation    Patient location during evaluation: bedside  Patient participation: complete - patient participated  Level of consciousness: awake and alert  Pain management: adequate  Airway patency: patent  Anesthetic complications: No anesthetic complications    Cardiovascular status: acceptable  Respiratory status: acceptable  Hydration status: acceptable    Comments: /78   Pulse 79   Temp 36.7 °C (98 °F) (Temporal)   Resp 16   Wt (!) 147 kg (323 lb 13.7 oz)   SpO2 96%   BMI 42.74 kg/m²

## 2019-11-13 ENCOUNTER — TELEPHONE (OUTPATIENT)
Dept: ORTHOPEDIC SURGERY | Facility: CLINIC | Age: 64
End: 2019-11-13

## 2019-11-13 DIAGNOSIS — M20.11 HALLUX VALGUS OF RIGHT FOOT: Primary | ICD-10-CM

## 2019-11-21 ENCOUNTER — OFFICE VISIT (OUTPATIENT)
Dept: ORTHOPEDIC SURGERY | Facility: CLINIC | Age: 64
End: 2019-11-21

## 2019-11-21 VITALS — WEIGHT: 315 LBS | HEIGHT: 72 IN | BODY MASS INDEX: 42.66 KG/M2 | TEMPERATURE: 97.9 F

## 2019-11-21 DIAGNOSIS — M20.11 HALLUX VALGUS OF RIGHT FOOT: Primary | ICD-10-CM

## 2019-11-21 DIAGNOSIS — L03.031 CELLULITIS OF TOE OF RIGHT FOOT: ICD-10-CM

## 2019-11-21 DIAGNOSIS — M77.41 METATARSALGIA OF RIGHT FOOT: ICD-10-CM

## 2019-11-21 DIAGNOSIS — M20.41 HAMMER TOE OF RIGHT FOOT: ICD-10-CM

## 2019-11-21 PROCEDURE — 99024 POSTOP FOLLOW-UP VISIT: CPT | Performed by: ORTHOPAEDIC SURGERY

## 2019-11-21 PROCEDURE — 29540 STRAPPING ANKLE &/FOOT: CPT | Performed by: ORTHOPAEDIC SURGERY

## 2019-11-21 PROCEDURE — 73630 X-RAY EXAM OF FOOT: CPT | Performed by: ORTHOPAEDIC SURGERY

## 2019-11-21 RX ORDER — SULFAMETHOXAZOLE AND TRIMETHOPRIM 800; 160 MG/1; MG/1
1 TABLET ORAL EVERY 12 HOURS
Qty: 30 TABLET | Refills: 0 | Status: SHIPPED | OUTPATIENT
Start: 2019-11-21 | End: 2020-02-13

## 2019-11-21 NOTE — PROGRESS NOTES
"Foot Follow Up      Patient: John Paul Cisneros    YOB: 1955 64 y.o. male    Chief Complaints: Foot pain    History of Present Illness: Patient underwent right  first phalangeal osteotomy as well as second third fourth and fifth MTP release and PIP joint fusions on 11/12/2019.  This included debridement of a callus over the distal aspect of the third toe    He was supposed to been nonweightbearing after this but was unable to obtain a scooter and has been doing some partial weightbearing only on his heel using crutches.    He reports moderate intermittent aching pain in the right forefoot but has had no fevers or chills.  HPI    ROS: Foot pain no fevers chills chest pain shortness of breath  Past Medical History:   Diagnosis Date   • Arthritis    • Colon polyp    • DVT (deep venous thrombosis) (CMS/HCC)    • Elevated cholesterol    • GERD (gastroesophageal reflux disease)    • Hypertension    • PE (pulmonary thromboembolism) (CMS/HCC)    • PONV (postoperative nausea and vomiting)    • Sleep apnea      Physical Exam:   Vitals:    11/21/19 1012   Temp: 97.9 °F (36.6 °C)   TempSrc: Temporal   Weight: (!) 147 kg (325 lb)   Height: 182.9 cm (72\")   PainSc: 0-No pain   PainLoc: Foot     Well developed with normal mood.  On examination he is with his wife.  His right foot shows some mild erythema along the dorsal central incision but no drainage or fluctuance.  Third toe demonstrates callus over the tip of the toe still with some mild maceration an intact blister over the dorsum of the toe between the PIP joint and the nail.  There does appear to be brisk capillary refill to the toe plantarly along the pulp.  His other incisions are without sign of infection and there is brisk capillary refill to the toes.  There was no drainage from the third toe or to the other pins.      Radiology: 3 views of the right foot ordered to evaluate postoperative alignment reviewed and compared with prior x-rays.  There appears " to be good alignment to his phalangeal osteotomy.  The second third fourth and fifth MTP and PIP joints remain well aligned and hardware is intact.  I will see any obvious lucencies.      Assessment/Plan: 1.  Status post extensive right forefoot reconstructive surgery with possible early cellulitis of the third toe with possible pin tract infection     I discussed the nature of this with he and his wife and toe at this time appears viable.    And concerned about possible early pin tract infection or cellulitis and went ahead and removed his third toe pin and the pin site was hemostatic with some mild maceration around it there is no purulent drainage from this.    His wounds were all cleaned thoroughly with Betadine was placed back into a forefoot and ankle spica dressing and made arrangements for him to get a scooter as soon as possible.    We will start him on Bactrim DS 1 p.o. twice daily and check him back in about 6 days with x-rays of his right foot.  If anything worsens in the interim they will let me know.    They told me how much they have appreciated my care and brought to the plate cherie allen

## 2019-11-27 ENCOUNTER — OFFICE VISIT (OUTPATIENT)
Dept: ORTHOPEDIC SURGERY | Facility: CLINIC | Age: 64
End: 2019-11-27

## 2019-11-27 VITALS — WEIGHT: 315 LBS | BODY MASS INDEX: 42.66 KG/M2 | TEMPERATURE: 98.4 F | HEIGHT: 72 IN

## 2019-11-27 DIAGNOSIS — M20.41 HAMMER TOE OF RIGHT FOOT: ICD-10-CM

## 2019-11-27 DIAGNOSIS — M77.41 METATARSALGIA OF RIGHT FOOT: ICD-10-CM

## 2019-11-27 DIAGNOSIS — Z98.890 S/P FOOT SURGERY, RIGHT: Primary | ICD-10-CM

## 2019-11-27 DIAGNOSIS — M20.11 HALLUX VALGUS OF RIGHT FOOT: ICD-10-CM

## 2019-11-27 DIAGNOSIS — L03.031 CELLULITIS OF TOE OF RIGHT FOOT: ICD-10-CM

## 2019-11-27 PROCEDURE — 99024 POSTOP FOLLOW-UP VISIT: CPT | Performed by: ORTHOPAEDIC SURGERY

## 2019-11-27 PROCEDURE — 73630 X-RAY EXAM OF FOOT: CPT | Performed by: ORTHOPAEDIC SURGERY

## 2019-11-27 PROCEDURE — 29540 STRAPPING ANKLE &/FOOT: CPT | Performed by: ORTHOPAEDIC SURGERY

## 2019-11-27 NOTE — PROGRESS NOTES
"Foot Follow Up      Patient: John Paul Cisneros    YOB: 1955 64 y.o. male    Chief Complaints: Foot getting better    History of Present Illness:Patient underwent right  first phalangeal osteotomy as well as second third fourth and fifth MTP release and PIP joint fusions on 11/12/2019.  This included debridement of a callus over the distal aspect of the third toe  He was last seen on 11/21/2019 at that time he had been doing some weightbearing as he has not yet gotten a scooter.  He has since gotten a scooter and has been nonweightbearing.    Also at that time there was some questionable area of infection on his dorsal wound and third toe.  His pin was removed and he was started on Bactrim.    His pain has markedly improved and he said it \"does not hurt at all\".  He has not had any drainage from his third toe pin tract  HPI    ROS: No foot pain, no fevers chills chest pain or shortness of breath  Past Medical History:   Diagnosis Date   • Arthritis    • Colon polyp    • DVT (deep venous thrombosis) (CMS/HCC)    • Elevated cholesterol    • GERD (gastroesophageal reflux disease)    • Hypertension    • PE (pulmonary thromboembolism) (CMS/HCC)    • PONV (postoperative nausea and vomiting)    • Sleep apnea      Physical Exam:   Vitals:    11/27/19 1048   Temp: 98.4 °F (36.9 °C)   Weight: (!) 147 kg (325 lb)   Height: 182.9 cm (72\")     Well developed with normal mood.  On exam the erythema over the dorsum of his right foot and third toe has resolved.  The blister of the distal aspect dorsally of his third toe is now dry and there is some chronic appearing callus with some discoloration over the very tip of the toe but the pulp demonstrates good capillary refill.  His remaining incisions are without sign of infection either and toes are neutral alignment with brisk capillary refill.  Remaining pin sites are without sign of infection      Radiology: 3 views of the right foot ordered evaluate postoperative " alignment reviewed and compared to previous x-rays there is been interval removal of the third pin with some loss of alignment of the third PIP joint but nothing dramatic.  The remaining pins are intact with good alignment to the MTP and PIP joints.  The first phalangeal osteotomy appears well aligned      Assessment/Plan: Status post right extensive forefoot surgery as outlined above with resolving probable early cellulitic infection    The third toe still remains viable but has some questionable skin changes that may eventually slough may need to be further debrided but do not see any clinical sign of infection at this time.    Sutures removed and Steri-Strips were applied    The area was cleaned with Betadine is placed back into a sterile forefoot and ankle bunion hammertoe spica dressing continue with nonweightbearing and elevation.    I will see him back in 2 weeks with x-rays of his right foot.

## 2019-12-11 ENCOUNTER — OFFICE VISIT (OUTPATIENT)
Dept: ORTHOPEDIC SURGERY | Facility: CLINIC | Age: 64
End: 2019-12-11

## 2019-12-11 VITALS — BODY MASS INDEX: 42.66 KG/M2 | WEIGHT: 315 LBS | TEMPERATURE: 97.9 F | HEIGHT: 72 IN

## 2019-12-11 DIAGNOSIS — Z98.890 S/P FOOT SURGERY, RIGHT: Primary | ICD-10-CM

## 2019-12-11 DIAGNOSIS — M77.41 METATARSALGIA OF RIGHT FOOT: ICD-10-CM

## 2019-12-11 DIAGNOSIS — L03.031 CELLULITIS OF TOE OF RIGHT FOOT: ICD-10-CM

## 2019-12-11 DIAGNOSIS — M20.11 HALLUX VALGUS OF RIGHT FOOT: ICD-10-CM

## 2019-12-11 DIAGNOSIS — M20.41 HAMMER TOE OF RIGHT FOOT: ICD-10-CM

## 2019-12-11 PROCEDURE — 99024 POSTOP FOLLOW-UP VISIT: CPT | Performed by: ORTHOPAEDIC SURGERY

## 2019-12-11 PROCEDURE — 29540 STRAPPING ANKLE &/FOOT: CPT | Performed by: ORTHOPAEDIC SURGERY

## 2019-12-11 PROCEDURE — 73630 X-RAY EXAM OF FOOT: CPT | Performed by: ORTHOPAEDIC SURGERY

## 2019-12-11 NOTE — PROGRESS NOTES
"Foot Follow Up      Patient: John Paul Cisneros    YOB: 1955 64 y.o. male    Chief Complaints: Foot doing okay    History of Present Illness:Patient underwent right  first phalangeal osteotomy as well as second third fourth and fifth MTP release and PIP joint fusions on 11/12/2019.  This included debridement of a callus over the distal aspect of the third toe    11/21/2019 there was some question area of infection and he was started on Bactrim and this is now resolved and his pin was pulled at the third toe.    He has been nonweightbearing and states that his foot really does not hurt at all.  He is been off antibiotic  HPI    ROS: No foot pain fevers or chills no chest pain or shortness of breath  Past Medical History:   Diagnosis Date   • Arthritis    • Colon polyp    • DVT (deep venous thrombosis) (CMS/HCC)    • Elevated cholesterol    • GERD (gastroesophageal reflux disease)    • Hypertension    • PE (pulmonary thromboembolism) (CMS/HCC)    • PONV (postoperative nausea and vomiting)    • Sleep apnea      Physical Exam:   Vitals:    12/11/19 1038   Temp: 97.9 °F (36.6 °C)   Weight: (!) 147 kg (325 lb)   Height: 182.9 cm (72\")     Well developed with normal mood.  Right foot incisions appear to be healing.  Third toe appears viable with some superficial skin slough over the dorsum that I left intact.  There is still some discoloration of the tip of the toe with some chronic appearing callus but the pulp has good capillary refill.  There is neutral alignment to the toes      Radiology: 3 views of the right foot ordered evaluate postoperative alignment reviewed and compared with previous x-rays.  The second and third metatarsal osteotomies appear to be healing in good alignment and the phalangeal osteotomy of the first appears to remain aligned and healing.  Hardware is intact across the second fourth and fifth toes with good alignment to all of the MTP joints.  There is been some loss of alignment of " the right third PIP joint but no significant change compared with previous x-rays.      Assessment/Plan: Status post extensive right forefoot surgery as outlined above with resolved cellulitis.    We had removed his pins today and pin sites were hemostatic.  Wounds were cleaned with Betadine he was placed back into a forefoot and ankle spica hammertoe and bunion dressing.  He may do partial foot flat weightbearing with walker or crutches in his postoperative shoe.    I will check him back in 7 to 10 days with x-rays of his right foot

## 2019-12-19 ENCOUNTER — OFFICE VISIT (OUTPATIENT)
Dept: ORTHOPEDIC SURGERY | Facility: CLINIC | Age: 64
End: 2019-12-19

## 2019-12-19 VITALS — BODY MASS INDEX: 42.66 KG/M2 | TEMPERATURE: 98.2 F | HEIGHT: 72 IN | WEIGHT: 315 LBS

## 2019-12-19 DIAGNOSIS — L03.031 CELLULITIS OF TOE OF RIGHT FOOT: ICD-10-CM

## 2019-12-19 DIAGNOSIS — M20.41 HAMMER TOE OF RIGHT FOOT: ICD-10-CM

## 2019-12-19 DIAGNOSIS — M77.41 METATARSALGIA OF RIGHT FOOT: ICD-10-CM

## 2019-12-19 DIAGNOSIS — M20.11 HALLUX VALGUS OF RIGHT FOOT: ICD-10-CM

## 2019-12-19 DIAGNOSIS — Z98.890 S/P FOOT SURGERY, RIGHT: Primary | ICD-10-CM

## 2019-12-19 PROCEDURE — 99024 POSTOP FOLLOW-UP VISIT: CPT | Performed by: ORTHOPAEDIC SURGERY

## 2019-12-19 PROCEDURE — 73630 X-RAY EXAM OF FOOT: CPT | Performed by: ORTHOPAEDIC SURGERY

## 2019-12-19 NOTE — PROGRESS NOTES
"Foot Follow Up      Patient: John Paul Cisneros    YOB: 1955 64 y.o. male    Chief Complaints: Foot feels okay    History of Present Illness:Patient underwent right  first phalangeal osteotomy as well as second third fourth and fifth MTP release and PIP joint fusions on 11/12/2019.  This included debridement of a callus over the distal aspect of the third toe     11/21/2019 there was some question area of infection and he was started on Bactrim and this is now resolved and his pin was pulled at the third toe.  He was last seen on 12/11/2019 without residual sign of infection and his remaining pins were pulled at our last visit.    He has been doing partial foot flat weightbearing with his walker and crutches with forefoot dressing in place.    He states he not having any pain in the foot and physic he is doing well      HPI    ROS: No foot pain, no fevers chills chest pain or shortness of breath  Past Medical History:   Diagnosis Date   • Arthritis    • Colon polyp    • DVT (deep venous thrombosis) (CMS/HCC)    • Elevated cholesterol    • GERD (gastroesophageal reflux disease)    • Hypertension    • PE (pulmonary thromboembolism) (CMS/HCC)    • PONV (postoperative nausea and vomiting)    • Sleep apnea      Physical Exam:   Vitals:    12/19/19 1520   Temp: 98.2 °F (36.8 °C)   Weight: (!) 147 kg (325 lb)   Height: 182.9 cm (72.01\")     Well developed with normal mood.  Right foot shows incisions appear to be healing nicely without sign of infection.  There are some superficial flaking to the skin but no warmth erythema and minimal swelling to the toes.  There is some residual callus over the tip of the third toe but no drainage.  Toes toes appear to be in neutral alignment and are markedly improved compared to preoperative exam.      Radiology: 3 views the right foot ordered evaluate postoperative alignment reviewed and compared with previous x-rays.  The first proximal phalangeal osteotomy appears to be " healing in good alignment as of the second and third metatarsal osteotomies.  The second and third MTP and PIP joints appear to be appropriately aligned without change in alignment compared to previous views      Assessment/Plan:  Status post extensive right forefoot surgery as outlined above with resolved cellulitis.    Overall he and his wife seem pleased and he seems to be doing well.  We leave him out of his dressing and he may let this get wet in the shower but understands not to immerse it.  He will do partial weightbearing with his postoperative shoe and a walker for the next 10 to 14 days and may then transition to a cane.    I will see him back in 3 weeks x-rays of his right foot

## 2020-01-09 ENCOUNTER — OFFICE VISIT (OUTPATIENT)
Dept: ORTHOPEDIC SURGERY | Facility: CLINIC | Age: 65
End: 2020-01-09

## 2020-01-09 VITALS — TEMPERATURE: 98.2 F | BODY MASS INDEX: 42.66 KG/M2 | HEIGHT: 72 IN | WEIGHT: 315 LBS

## 2020-01-09 DIAGNOSIS — Z98.890 S/P FOOT SURGERY, RIGHT: Primary | ICD-10-CM

## 2020-01-09 DIAGNOSIS — M20.41 HAMMER TOE OF RIGHT FOOT: ICD-10-CM

## 2020-01-09 DIAGNOSIS — M20.11 HALLUX VALGUS OF RIGHT FOOT: ICD-10-CM

## 2020-01-09 PROCEDURE — 99024 POSTOP FOLLOW-UP VISIT: CPT | Performed by: ORTHOPAEDIC SURGERY

## 2020-01-09 PROCEDURE — 73630 X-RAY EXAM OF FOOT: CPT | Performed by: ORTHOPAEDIC SURGERY

## 2020-01-09 NOTE — PROGRESS NOTES
"Foot Follow Up      Patient: John Paul Cisneros    YOB: 1955 64 y.o. male    Chief Complaints: Foot doing okay    History of Present Illness:Patient underwent right  first phalangeal osteotomy as well as second third fourth and fifth MTP release and PIP joint fusions on 11/12/2019.  This included debridement of a callus over the distal aspect of the third toe.  On 11/21/2019 there is some questionable area of infection.  He was started on Bactrim and his pin was pulled and symptoms subsequently resolved.    He was last seen on 12/19/2019 and has progressed to his postoperative shoe and a cane has no complaints of pain in the foot and swelling has improved.  HPI    ROS: No foot pain, no fevers chills chest pain or shortness of breath  Past Medical History:   Diagnosis Date   • Arthritis    • Colon polyp    • DVT (deep venous thrombosis) (CMS/HCC)    • Elevated cholesterol    • GERD (gastroesophageal reflux disease)    • Hypertension    • PE (pulmonary thromboembolism) (CMS/HCC)    • PONV (postoperative nausea and vomiting)    • Sleep apnea      Physical Exam:   Vitals:    01/09/20 1116   Temp: 98.2 °F (36.8 °C)   Weight: (!) 147 kg (325 lb)   Height: 182.9 cm (72\")     Well developed with normal mood.  Right foot incisions are well-healed and there was relatively neutral alignment to his toes without gross clinical deformity.  There is quite a bit of hypertrophic callus over the distal aspect of the third toe but no sign of infection.  I elevated this callus and it came off in its entirety including his nail but there was no sign of infection and skin was completely intact beneath this.      Radiology: 3 views right foot ordered evaluate postoperative alignment reviewed and compared with previous x-rays.  There is been no appreciable change in alignment compared with previous x-rays.  The first phalangeal osteotomy appears to be healing and in good alignment as is the second and third metatarsal " osteotomies.  There is been no change in alignment to the second through fifth PIP joints to any appreciable degree.      Assessment/Plan:  Status post extensive right forefoot surgery as outlined above with resolved cellulitis.    Overall he seems be doing very well and without any appreciable complaints.  We will keep the area of the third toe clean and may progress to extra-depth shoes in the house over the next 2 weeks and then start weaning out of the postoperative shoe altogether.  Recommend he continue with use of his cane as needed.    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

## 2020-02-13 ENCOUNTER — OFFICE VISIT (OUTPATIENT)
Dept: ORTHOPEDIC SURGERY | Facility: CLINIC | Age: 65
End: 2020-02-13

## 2020-02-13 VITALS — HEIGHT: 72 IN | TEMPERATURE: 97.9 F | WEIGHT: 315 LBS | BODY MASS INDEX: 42.66 KG/M2

## 2020-02-13 DIAGNOSIS — M20.41 HAMMER TOE OF RIGHT FOOT: ICD-10-CM

## 2020-02-13 DIAGNOSIS — M20.11 HALLUX VALGUS OF RIGHT FOOT: Primary | ICD-10-CM

## 2020-02-13 PROCEDURE — 99213 OFFICE O/P EST LOW 20 MIN: CPT | Performed by: ORTHOPAEDIC SURGERY

## 2020-02-13 PROCEDURE — 73630 X-RAY EXAM OF FOOT: CPT | Performed by: ORTHOPAEDIC SURGERY

## 2020-02-13 NOTE — PROGRESS NOTES
"Foot Follow Up      Patient: John Paul Cisneros    YOB: 1955 64 y.o. male    Chief Complaints: Foot doing okay but legs are swelling    History of Present Illness:Patient underwent right  first phalangeal osteotomy as well as second third fourth and fifth MTP release and PIP joint fusions on 11/12/2019.  This included debridement of a callus over the distal aspect of the third toe.  On 11/21/2019 there is some questionable area of infection.  He was started on Bactrim and his pin was pulled and symptoms subsequently resolved.    He had some recurrent deformity to his third toe is not having any pain in his lesser toes really not having any sniffing complaint other than some occasional ache in the right great toe which has worsened some now with activity and with swelling to his legs and feet to some degree.  He said it hurts some now after coming out of his postoperative shoe and had some increased pain with swelling the last week that has now improved and pain is only at most a 1 out of 10 in the great toe.  HPI    ROS: Foot pain no fever chills chest pain or shortness of breath  Past Medical History:   Diagnosis Date   • Arthritis    • Colon polyp    • DVT (deep venous thrombosis) (CMS/HCC)    • Elevated cholesterol    • GERD (gastroesophageal reflux disease)    • Hypertension    • PE (pulmonary thromboembolism) (CMS/HCC)    • PONV (postoperative nausea and vomiting)    • Sleep apnea      Physical Exam:   Vitals:    02/13/20 0955   Temp: 97.9 °F (36.6 °C)   Weight: (!) 147 kg (325 lb)   Height: 182.9 cm (72\")   PainSc:   1     Well developed with normal mood.  Right foot shows some recurrent deformity of the third toe and minimal swelling to the toes but no focal discomfort to palpation other than some very vague discomfort over the dorsum of the base of the right first proximal phalanx with mild swelling but no sign of infection.      Radiology: 3 views of the right foot ordered evaluate " postoperative alignment reviewed and compared with previous x-rays.  There is good alignment to the first proximal phalanx which appears to be healing.  There is no change in alignment to the second and third MTP joints and osteotomies appear healed there is residual varus of the third PIP joint without appreciable change in alignment and the second and fourth appear to line up well  as does the fifth.      Assessment/Plan: Status post right forefoot surgery as outlined above.    I do not see any sign of recurrent infection in his third toe although there is some recurrent deformity but no tenderness to palpation.    I am little concerned about the discomfort around his first toe this may be just from some increased swelling to his legs.    He is seeing his PCP tomorrow about the swelling in his legs he was fitted with compression hose today.    And get a CT scan of his right foot to make sure that his first proximal phalanx is healed.    He understands to call to schedule follow-up appointment after CT has been scheduled to review results in the office.    Discussed possible putting back in his postoperative shoe but he said injection and fairly comfortable in extra-depth relatively stiff sturdy shoes that he is been wearing.    Anything worsens they will let me know

## 2020-02-20 ENCOUNTER — TELEPHONE (OUTPATIENT)
Dept: ORTHOPEDIC SURGERY | Facility: CLINIC | Age: 65
End: 2020-02-20

## 2020-02-20 ENCOUNTER — APPOINTMENT (OUTPATIENT)
Dept: CT IMAGING | Facility: HOSPITAL | Age: 65
End: 2020-02-20

## 2020-02-20 NOTE — TELEPHONE ENCOUNTER
I checked on the web site one more time, and thought it was a miracle, but I should have known It was you.

## 2020-02-21 ENCOUNTER — HOSPITAL ENCOUNTER (OUTPATIENT)
Dept: CT IMAGING | Facility: HOSPITAL | Age: 65
Discharge: HOME | End: 2020-02-21
Admitting: ORTHOPAEDIC SURGERY

## 2020-02-21 DIAGNOSIS — M20.11 HALLUX VALGUS OF RIGHT FOOT: ICD-10-CM

## 2020-02-21 PROCEDURE — 73700 CT LOWER EXTREMITY W/O DYE: CPT

## 2020-02-24 ENCOUNTER — OFFICE VISIT (OUTPATIENT)
Dept: ORTHOPEDIC SURGERY | Facility: CLINIC | Age: 65
End: 2020-02-24

## 2020-02-24 VITALS — BODY MASS INDEX: 42.66 KG/M2 | WEIGHT: 315 LBS | TEMPERATURE: 98.1 F | HEIGHT: 72 IN

## 2020-02-24 DIAGNOSIS — M20.41 HAMMER TOE OF RIGHT FOOT: ICD-10-CM

## 2020-02-24 DIAGNOSIS — M20.11 HALLUX VALGUS OF RIGHT FOOT: ICD-10-CM

## 2020-02-24 DIAGNOSIS — S92.414A CLOSED NONDISPLACED FRACTURE OF PROXIMAL PHALANX OF RIGHT GREAT TOE, INITIAL ENCOUNTER: Primary | ICD-10-CM

## 2020-02-24 DIAGNOSIS — M77.41 METATARSALGIA OF RIGHT FOOT: ICD-10-CM

## 2020-02-24 PROCEDURE — 28490 TREAT BIG TOE FRACTURE: CPT | Performed by: ORTHOPAEDIC SURGERY

## 2020-02-24 PROCEDURE — 99213 OFFICE O/P EST LOW 20 MIN: CPT | Performed by: ORTHOPAEDIC SURGERY

## 2020-02-24 NOTE — PROGRESS NOTES
Foot Follow Up      Patient: John Paul Cisneros    YOB: 1955 64 y.o. male    Chief Complaints: Foot feels a little better    History of Present Illness:Patient underwent right  first phalangeal osteotomy as well as second third fourth and fifth MTP release and PIP joint fusions on 11/12/2019.  This included debridement of a callus over the distal aspect of the third toe.  On 11/21/2019 there is some questionable area of infection.  He was started on Bactrim and his pin was pulled and symptoms subsequently resolved.    Patient has had some recurrent deformity to his third toe but was not having any pain to that area when I last saw him on 2/13/2020.  At that visit he had some complaints of occasional achiness and increased swelling in his right great toe and had some increased swelling in both legs.  And was due to see his PCP for this.    He reports that his foot pain has improved and told today that at our previous appointment he had forgotten to tell me that prior to January 11 he was not having any pain with problems with swelling or pain in his right foot and he does recall now that on January 11 there power had gone out and he had to go up and down the steps somewhat quickly and vigorously at least 6 times for flipping breakers turning on generators etc. and had onset of pain in the great toe with some swelling at that point.    He is been using compression hose since I last saw him which is helped some with the swelling to get some progressive swelling in the right foot and great toe by the end of the day he has been fairly comfortable however and stiff accommodative extra-depth shoes.  HPI    ROS: Foot pain  Past Medical History:   Diagnosis Date   • Arthritis    • Colon polyp    • DVT (deep venous thrombosis) (CMS/HCC)    • Elevated cholesterol    • GERD (gastroesophageal reflux disease)    • Hypertension    • PE (pulmonary thromboembolism) (CMS/HCC)    • PONV (postoperative nausea and  "vomiting)    • Sleep apnea      Physical Exam:   Vitals:    02/24/20 1140   Temp: 98.1 °F (36.7 °C)   Weight: (!) 156 kg (344 lb)   Height: 181.6 cm (71.5\")     Well developed with normal mood.  He is with his wife.  Right third toe shows some recurrent deformity but no sign of recurrent infection.  There is neutral alignment of the second fourth and fifth toes as well as relatively neutral alignment to the first toe with only mild swelling and no focal tenderness to palpation today over the base of the right first proximal phalanx or with first MTP motion.  There is no pain to palpation over the second and third metatarsals or metatarsal phalangeal joint mobilization.      Radiology: CT scan films and report of the right foot dated 2/21/2020 were reviewed which show that the osteotomy and stapling of the first proximal phalanx appears solidly healed but there is a new transverse fracture line through the proximal metaphysis proximal to the staple with some mild callus along the fracture line and less than 50% bridging with mild dorsal impaction and apex plantar angulation but no articular involvement.    There is also postsurgical changes of the second third fourth and fifth toes as well as arthrosis of the talonavicular and naviculocuneiform joints and mild arthritis in the midfoot and first MTP joint      Assessment/Plan: 1.  Status post right foot surgery as outlined above  2.  Right new first proximal phalanx fracture  3.  Right midfoot and first MTP arthritis  3.  Right talonavicular and naviculocuneiform arthritis    We reviewed treatment options and nothing I would recommend from a surgical standpoint at this time as his osteotomy appears healed nor does he desire it.    He will continue with compression hose and we will have him get back into his postoperative shoe for the next 2 to 3 weeks and limit activity.  He will continue with elevation and if anything worsens he will let me know otherwise I will see " him back in 3 weeks with x-rays of his right foot.

## 2020-03-16 ENCOUNTER — OFFICE VISIT (OUTPATIENT)
Dept: ORTHOPEDIC SURGERY | Facility: CLINIC | Age: 65
End: 2020-03-16

## 2020-03-16 VITALS — HEIGHT: 72 IN | BODY MASS INDEX: 42.66 KG/M2 | WEIGHT: 315 LBS | TEMPERATURE: 98.2 F

## 2020-03-16 DIAGNOSIS — M20.11 HALLUX VALGUS OF RIGHT FOOT: ICD-10-CM

## 2020-03-16 DIAGNOSIS — R52 PAIN: Primary | ICD-10-CM

## 2020-03-16 DIAGNOSIS — M19.079 ARTHRITIS OF FOOT: ICD-10-CM

## 2020-03-16 DIAGNOSIS — M20.41 HAMMER TOE OF RIGHT FOOT: ICD-10-CM

## 2020-03-16 DIAGNOSIS — S92.414D CLOSED NONDISPLACED FRACTURE OF PROXIMAL PHALANX OF RIGHT GREAT TOE WITH ROUTINE HEALING, SUBSEQUENT ENCOUNTER: ICD-10-CM

## 2020-03-16 PROBLEM — S92.414A CLOSED NONDISPLACED FRACTURE OF PROXIMAL PHALANX OF RIGHT GREAT TOE: Status: ACTIVE | Noted: 2020-03-16

## 2020-03-16 PROCEDURE — 99213 OFFICE O/P EST LOW 20 MIN: CPT | Performed by: ORTHOPAEDIC SURGERY

## 2020-03-16 PROCEDURE — 73630 X-RAY EXAM OF FOOT: CPT | Performed by: ORTHOPAEDIC SURGERY

## 2020-03-16 NOTE — PROGRESS NOTES
Foot Follow Up      Patient: John Paul Cisneros    YOB: 1955 64 y.o. male    Chief Complaints: Foot pain is better    History of Present Illness:Patient underwent right  first phalangeal osteotomy as well as second third fourth and fifth MTP release and PIP joint fusions on 11/12/2019.  This included debridement of a callus over the distal aspect of the third toe.  On 11/21/2019 there is some questionable area of infection.  He was started on Bactrim and his pin was pulled and symptoms subsequently resolved.     Patient has had some recurrent deformity to his third toe but was not having any pain to that area when I last saw him on 2/13/2020.  At that visit he had some complaints of occasional achiness and increased swelling in his right great toe and had some increased swelling in both legs.  And was due to see his PCP for this.     On 2/24/2020 he reported that his foot pain had improved and told me that day that at our previous appointment he had forgotten to tell me that prior to January 11 he was not having any pain with problems with swelling or pain in his right foot and he does recall now that on January 11 their power had gone out and he had to go up and down the steps somewhat quickly and vigorously at least 6 times for flipping breakers turning on generators etc. and had onset of pain in the great toe with some swelling at that point.     He had been using compression hose since previous visit which had helped some with the swelling but still had progressive swelling in the right foot and great toe by the end of the day he has been fairly comfortable however and stiff accommodative extra-depth shoes.    At that visit his CT was reviewed which showed a new transverse fracture through the proximal metaphysis proximal to the staple as well as postsurgical changes and arthritis at the midfoot and first MTP joint.    He was instructed on continued use of compression hose and postoperative  "shoe.    He is seen back today stating that the pain has resolved still gets some intermittent swelling and has gone several days intermittently without the postop shoe with his deep stiff soled regular shoe and gone occasionally to the bathroom into the shower without any shoe without pain in the foot.  HPI    ROS: No foot pain  Past Medical History:   Diagnosis Date   • Arthritis    • Colon polyp    • DVT (deep venous thrombosis) (CMS/HCC)    • Elevated cholesterol    • GERD (gastroesophageal reflux disease)    • Hypertension    • PE (pulmonary thromboembolism) (CMS/HCC)    • PONV (postoperative nausea and vomiting)    • Sleep apnea      Physical Exam:   Vitals:    03/16/20 0823   Temp: 98.2 °F (36.8 °C)   Weight: (!) 152 kg (335 lb)   Height: 182.9 cm (72\")   PainSc: 0-No pain     Well developed with normal mood.  On exam there is no pain to the forefoot but he is grossly sensate to light touch.  There is some recurrent curvature of the third toe but without discomfort.  There is slight flexion deformity of the IP joint of the great toe but no focal tenderness over the distal medial aspect of the proximal phalanx or proximally.  There is no warmth erythema about the toes      Radiology: 3 views of the right foot ordered evaluate postoperative alignment and review fracture reviewed and unable to compared to previous views as PACS system was down.  From what I can tell the second and third MTP joints and PIP joints remain without change without change in alignment as does the fifth.  The first proximal phalanx appears to be without change in alignment with callus formation around the area of the proximal new fracture.  On the oblique view there may be a fracture line going toward the IP joint without displacement.    Assessment/Plan:  1.  Status post right foot surgery as outlined above  2.  Right new first proximal phalanx fracture  3.  Right midfoot and first MTP arthritis  3.  Right talonavicular and " naviculocuneiform arthritis    Reviewed treatment options with them and he does have somewhat of a flexion deformity of the IP joint of the great toe he will work on range of motion of that and we will need to monitor things closely clinically I did not see evidence of a new fracture as he was not having pain in the distal medial aspect of the proximal phalanx of the great toe.    He has gone several times for a day or so with just his extra-depth stiff shoe rather than the postoperative shoe notices no change compared with use of the postoperative shoe will have him wean out of the postoperative shoe back into his extra-depth sturdy shoe and use compression hose and if he has any return of pain he will back off and let me know otherwise I will see him back in 3 weeks x-rays of his right foot.  Discussed with him if he has problems with the flexion of the IP joint may eventually require surgical treatment for this.  We had a pleasant visit and they told me how much they have appreciated my care

## 2020-05-11 ENCOUNTER — OFFICE VISIT (OUTPATIENT)
Dept: ORTHOPEDIC SURGERY | Facility: CLINIC | Age: 65
End: 2020-05-11

## 2020-05-11 VITALS — HEIGHT: 72 IN | BODY MASS INDEX: 42.66 KG/M2 | TEMPERATURE: 96.8 F | WEIGHT: 315 LBS

## 2020-05-11 DIAGNOSIS — M20.41 HAMMER TOE OF RIGHT FOOT: ICD-10-CM

## 2020-05-11 DIAGNOSIS — M19.079 ARTHRITIS OF FOOT: ICD-10-CM

## 2020-05-11 DIAGNOSIS — S92.414D CLOSED NONDISPLACED FRACTURE OF PROXIMAL PHALANX OF RIGHT GREAT TOE WITH ROUTINE HEALING, SUBSEQUENT ENCOUNTER: ICD-10-CM

## 2020-05-11 DIAGNOSIS — M20.11 HALLUX VALGUS OF RIGHT FOOT: Primary | ICD-10-CM

## 2020-05-11 DIAGNOSIS — M77.41 METATARSALGIA OF RIGHT FOOT: ICD-10-CM

## 2020-05-11 PROCEDURE — 73630 X-RAY EXAM OF FOOT: CPT | Performed by: ORTHOPAEDIC SURGERY

## 2020-05-11 PROCEDURE — 99213 OFFICE O/P EST LOW 20 MIN: CPT | Performed by: ORTHOPAEDIC SURGERY

## 2020-05-11 RX ORDER — AMLODIPINE BESYLATE 10 MG/1
TABLET ORAL
COMMUNITY
Start: 2020-05-10 | End: 2022-12-08

## 2020-05-11 RX ORDER — MELOXICAM 15 MG/1
15 TABLET ORAL DAILY
COMMUNITY
Start: 2020-05-10 | End: 2022-12-13

## 2020-05-11 NOTE — PROGRESS NOTES
"Foot Follow Up      Patient: John Paul Cisneros    YOB: 1955 64 y.o. male    Chief Complaints: Foot does not hurt    History of Present Illness: Patient underwent right first phalangeal osteotomy as well as second third fourth and fifth MTP release and PIP joint fusion on 11/12/2019.    During his postoperative period he had questionable infection of the third toe and pin was pulled and infection resolved but he had recurrent deformity of the third toe.    Also during his postoperative period he had an episode in January where he had an injury to his great toe with subsequent fracture proximal to the osteotomy.    He was last seen on 3/16/2020 and has since continued and extra-depth shoes and swelling to the great toe has improved to some degree and he has no complaints of pain in any of the toes nor foot..  HPI    ROS: No foot or toe pain  Past Medical History:   Diagnosis Date   • Arthritis    • Colon polyp    • DVT (deep venous thrombosis) (CMS/HCC)    • Elevated cholesterol    • GERD (gastroesophageal reflux disease)    • Hypertension    • PE (pulmonary thromboembolism) (CMS/HCC)    • PONV (postoperative nausea and vomiting)    • Sleep apnea      Physical Exam:   Vitals:    05/11/20 0815   Temp: 96.8 °F (36 °C)   Weight: (!) 156 kg (344 lb 3.2 oz)   Height: 182.9 cm (72\")   PainSc: 0-No pain     Well developed with normal mood.  Right foot shows well-healed incisions there is neutral alignment to the second fourth and fifth toes.  Third toe shows some plantar flexion and varus deviation from the PIP joint distally that is rigid but without ulceration or callus.  There is mild swelling around the proximal phalanx of the great toe with mild flexion deformity which is nearly passively correctable but no pain.  He is nontender in the midfoot      Radiology: 3 views of the right foot ordered evaluate alignment reviewed and compared with previous x-rays.  The osteotomy of the first proximal phalanx as " well as second and third metatarsals appear healed.  The fracture previously noted proximal to the staple at the first proximal phalanx of the great toe appears healed.  There is residual flexion deformity at the IP joint of the first toe as well as recurrent deformity at the third toe with flexion and varus alignment the PIP joint extending distally.  The fourth and fifth PIP joints remain well aligned with apparent fusion at the fourth.  There is residual arthritic change at the naviculocuneiform articulation as well as the talonavicular joint.    Assessment/Plan:  1.  Status post right foot surgery as outlined above  2.  Right  first proximal phalanx fracture  3.  Right midfoot and first MTP arthritis  4.  Right talonavicular and naviculocuneiform arthritis      We discussed treatment options going forward and as he is not having any pain he does not desire any further surgical treatment.  Reviewed with him that surgical treatment would involve revision arthrodesis of the third toe as well as extensor tendon lengthening and possible IP joint fusion of the great toe.    He does not want to do this at this time as he is not having pain but understands to watch his foot very carefully and check it several times a day to make sure he not develop any callus or ulcerations around the third or first toes and if so to get back in the postoperative shoe and that me know right away.    We had a pleasant visit any to me much she has appreciated my care.    I will see him back in 3 months with x-rays of his right foot.

## 2020-06-25 ENCOUNTER — OFFICE VISIT (OUTPATIENT)
Dept: ORTHOPEDIC SURGERY | Facility: CLINIC | Age: 65
End: 2020-06-25

## 2020-06-25 VITALS — HEIGHT: 72 IN | TEMPERATURE: 98.4 F | BODY MASS INDEX: 42.66 KG/M2 | WEIGHT: 315 LBS

## 2020-06-25 DIAGNOSIS — M25.562 LEFT KNEE PAIN, UNSPECIFIED CHRONICITY: Primary | ICD-10-CM

## 2020-06-25 PROCEDURE — 99213 OFFICE O/P EST LOW 20 MIN: CPT | Performed by: NURSE PRACTITIONER

## 2020-06-25 PROCEDURE — 20610 DRAIN/INJ JOINT/BURSA W/O US: CPT | Performed by: NURSE PRACTITIONER

## 2020-06-25 PROCEDURE — 73562 X-RAY EXAM OF KNEE 3: CPT | Performed by: NURSE PRACTITIONER

## 2020-06-25 RX ORDER — IRBESARTAN 150 MG/1
TABLET ORAL
COMMUNITY
Start: 2020-05-15 | End: 2022-12-08

## 2020-06-25 RX ORDER — LIDOCAINE HYDROCHLORIDE 20 MG/ML
2 INJECTION, SOLUTION EPIDURAL; INFILTRATION; INTRACAUDAL; PERINEURAL
Status: COMPLETED | OUTPATIENT
Start: 2020-06-25 | End: 2020-06-25

## 2020-06-25 RX ORDER — METHYLPREDNISOLONE ACETATE 80 MG/ML
80 INJECTION, SUSPENSION INTRA-ARTICULAR; INTRALESIONAL; INTRAMUSCULAR; SOFT TISSUE
Status: COMPLETED | OUTPATIENT
Start: 2020-06-25 | End: 2020-06-25

## 2020-06-25 RX ADMIN — METHYLPREDNISOLONE ACETATE 80 MG: 80 INJECTION, SUSPENSION INTRA-ARTICULAR; INTRALESIONAL; INTRAMUSCULAR; SOFT TISSUE at 11:59

## 2020-06-25 RX ADMIN — LIDOCAINE HYDROCHLORIDE 2 ML: 20 INJECTION, SOLUTION EPIDURAL; INFILTRATION; INTRACAUDAL; PERINEURAL at 11:59

## 2020-06-25 NOTE — PROGRESS NOTES
Patient: John Paul Cisneros  YOB: 1955 65 y.o. male  Medical Record Number: [id number]    Chief Complaints:   Chief Complaint   Patient presents with   • Left Knee - Follow-up, Pain       History of Present Illness:John Paul Cisneros is a 65 y.o. male who presents with complaints of increased left knee pain.  Patient has known history of osteoarthritis.  Reports he has had increased pain over the last several weeks.  Denies any recent injury.  Describes the knee pain as a moderate sometimes severe constant ache worse with standing walking, better with rest    Allergies:   Allergies   Allergen Reactions   • Darvon [Propoxyphene] Other (See Comments)     Floating in the clouds   • Oxycodone Other (See Comments)     Cramps   • Adhesive Tape Itching       Medications:   Current Outpatient Medications   Medication Sig Dispense Refill   • amLODIPine (NORVASC) 10 MG tablet      • AMLODIPINE BESYLATE PO Take 10 mg by mouth Daily.     • aspirin  MG tablet Take 1 tablet by mouth Every 12 (Twelve) Hours. 60 tablet 1   • atorvastatin (LIPITOR) 80 MG tablet Take 40 mg by mouth Daily.     • febuxostat (ULORIC) 40 MG tablet Take 40 mg by mouth Daily.     • irbesartan (AVAPRO) 150 MG tablet      • meloxicam (MOBIC) 15 MG tablet      • omeprazole (priLOSEC) 40 MG capsule Take 20 mg by mouth Daily.     • sucralfate (CARAFATE) 1 g tablet Take 1 tablet by mouth 4 (Four) Times a Day. (Patient taking differently: Take 1 g by mouth 4 (Four) Times a Day As Needed.) 120 tablet 3   • Testosterone (AXIRON) 30 MG/ACT solution Apply  topically to the appropriate area as directed. ONE PUMP DAILY       No current facility-administered medications for this visit.      Facility-Administered Medications Ordered in Other Visits   Medication Dose Route Frequency Provider Last Rate Last Dose   • lactated ringers infusion  100 mL/hr Intravenous Continuous Hermila Sutton CRNA             The following portions of the patient's history  "were reviewed and updated as appropriate: allergies, current medications, past family history, past medical history, past social history, past surgical history and problem list.    Review of Systems:   A 14 point review of systems was performed. All systems negative except pertinent positives/negative listed in HPI above    Physical Exam:   Vitals:    06/25/20 1155   Temp: 98.4 °F (36.9 °C)   Weight: (!) 151 kg (333 lb)   Height: 182.9 cm (72\")   PainSc:   7       General: A and O x 3, ASA, NAD    SCLERA:    Normal    DENTITION:   Normal  Skin clear no unusual lesions noted  Left knee patient has no appreciable effusion limited range of motion secondary to pain calf is soft and nontender    Radiology:  Xrays previous x-rays of the left knee were reviewed show significant arthritic changes    Assessment/Plan:  Osteoarthritis left knee    Patient discussed treatment options.  He would like to proceed with left knee cortisone injection.  Prior to injection risks were discussed including pain infection.  Patient verbalized understanding would like to proceed with injection he will continue with physical therapy exercises and we will see him back as needed    Large Joint Arthrocentesis: L knee  Date/Time: 6/25/2020 11:59 AM  Consent given by: patient  Site marked: site marked  Timeout: Immediately prior to procedure a time out was called to verify the correct patient, procedure, equipment, support staff and site/side marked as required   Supporting Documentation  Indications: pain   Procedure Details  Location: knee - L knee  Preparation: Patient was prepped and draped in the usual sterile fashion  Needle gauge: 21g.  Approach: lateral  Medications administered: 2 mL lidocaine PF 2% 2 %; 80 mg methylPREDNISolone acetate 80 MG/ML  Patient tolerance: patient tolerated the procedure well with no immediate complications          "

## 2020-08-10 ENCOUNTER — OFFICE VISIT (OUTPATIENT)
Dept: ORTHOPEDIC SURGERY | Facility: CLINIC | Age: 65
End: 2020-08-10

## 2020-08-10 VITALS — TEMPERATURE: 98.9 F | HEIGHT: 72 IN | BODY MASS INDEX: 42.66 KG/M2 | WEIGHT: 315 LBS

## 2020-08-10 DIAGNOSIS — M20.41 HAMMER TOE OF RIGHT FOOT: ICD-10-CM

## 2020-08-10 DIAGNOSIS — M19.079 ARTHRITIS OF FOOT: ICD-10-CM

## 2020-08-10 DIAGNOSIS — M20.11 HALLUX VALGUS OF RIGHT FOOT: Primary | ICD-10-CM

## 2020-08-10 DIAGNOSIS — S92.414D CLOSED NONDISPLACED FRACTURE OF PROXIMAL PHALANX OF RIGHT GREAT TOE WITH ROUTINE HEALING, SUBSEQUENT ENCOUNTER: ICD-10-CM

## 2020-08-10 PROCEDURE — 99213 OFFICE O/P EST LOW 20 MIN: CPT | Performed by: ORTHOPAEDIC SURGERY

## 2020-08-10 PROCEDURE — 73630 X-RAY EXAM OF FOOT: CPT | Performed by: ORTHOPAEDIC SURGERY

## 2020-08-10 NOTE — PROGRESS NOTES
"Foot Follow Up      Patient: John Paul Cisneros    YOB: 1955 65 y.o. male    Chief Complaints: Foot doing okay    History of Present Illness:Patient underwent right first phalangeal osteotomy as well as second third fourth and fifth MTP release and PIP joint fusion on 11/12/2019.     During his postoperative period he had questionable infection of the third toe and pin was pulled and infection resolved but he had recurrent deformity of the third toe.     Also during his postoperative period he had an episode in January where he had an injury to his great toe with subsequent fracture proximal to the osteotomy.    He was last seen on 5/11/2020 he was using extra-depth shoes and swelling to his great toe had improved to some degree without any specific complaints of pain in the right foot    That time we discussed treatment options and he did not desire any further surgical treatment as he was not having pain and reviewed with him that further surgical treatment would involve revision arthrodesis of the third toe and extensor tendon lengthening and possible IP joint fusion of the great toe.  He was instructed to watch his feet very carefully for any callus or ulceration    He been using extra-depth shoes and complains of some intermittent burning feeling in his toes but no pain on the first MTP joint.  He is not had any ulceration or callus.  HPI    ROS: Foot pain  Past Medical History:   Diagnosis Date   • Arthritis    • Colon polyp    • DVT (deep venous thrombosis) (CMS/HCC)    • Elevated cholesterol    • GERD (gastroesophageal reflux disease)    • Hypertension    • PE (pulmonary thromboembolism) (CMS/HCC)    • PONV (postoperative nausea and vomiting)    • Sleep apnea      Physical Exam:   Vitals:    08/10/20 0839   Temp: 98.9 °F (37.2 °C)   Weight: (!) 151 kg (333 lb)   Height: 182.9 cm (72\")   PainSc:   3     Well developed with normal mood.  Right third toe shows significant flexion deformity that is " not passively correctable he was grossly sensate light touch on the dorsum of the third toe with some diminished sensation in the tips of his second and third toe.  Tip of the third toes without any callus or ulcerations there was some flexion deformity at the IP joint of the great toe but no tenderness to palpation on the proximal phalanx.  Grossly sensate light touch dorsal and plantar foot.      Radiology: 3 views of the right foot ordered evaluate postoperative alignment reviewed and previous x-rays not immediately available for review due to technical difficulties.  The first proximal phalanx osteotomy appears healed and I do not see any sign of residual fracture.  There is flexion deformity at the IP joint of the great toe as well as flexion deformity of the third toe with nonunion at the fusion site at the PIP joint.  Second PIP joint appears fused second and third MTP joints appear congruent with some arthritic change but no clear collapse although there is a little bit of lytic area.  There is degenerative change to the midfoot.      Assessment/Plan:  1.  Status post right foot surgery as outlined above  2.  Right  first proximal phalanx fracture  3.  Right midfoot and first MTP arthritis  4.  Right talonavicular and naviculocuneiform arthritis    Overall he seems be doing well and there is nothing that he wants to do that he is not doing because of any limitations to his foot.  The numbness in his toes may be from the surgery as he is able to sensate light touch on the remainder of the foot does not have any symptoms in the left foot.  Counseled him to do not much to do about this and may improve some over time but unlikely at this point given the duration since his surgery.    He does not desire any further surgical treatment for his foot.      Counseled him to keep an eye on his toes very carefully to make sure no callus or ulceration and use of extra-depth shoes with arch support and this will help  support the arthritic change at the midfoot as well..    If anything worsens let me know otherwise I will see him back in about 3 months x-rays of his right foot.

## 2020-09-25 ENCOUNTER — CLINICAL SUPPORT (OUTPATIENT)
Dept: ORTHOPEDIC SURGERY | Facility: CLINIC | Age: 65
End: 2020-09-25

## 2020-09-25 VITALS — BODY MASS INDEX: 42.66 KG/M2 | WEIGHT: 315 LBS | TEMPERATURE: 98 F | HEIGHT: 72 IN

## 2020-09-25 DIAGNOSIS — M17.12 PRIMARY OSTEOARTHRITIS OF LEFT KNEE: Primary | ICD-10-CM

## 2020-09-25 PROCEDURE — 99213 OFFICE O/P EST LOW 20 MIN: CPT | Performed by: NURSE PRACTITIONER

## 2020-09-25 PROCEDURE — 20610 DRAIN/INJ JOINT/BURSA W/O US: CPT | Performed by: NURSE PRACTITIONER

## 2020-09-25 RX ORDER — METHYLPREDNISOLONE ACETATE 80 MG/ML
80 INJECTION, SUSPENSION INTRA-ARTICULAR; INTRALESIONAL; INTRAMUSCULAR; SOFT TISSUE
Status: COMPLETED | OUTPATIENT
Start: 2020-09-25 | End: 2020-09-25

## 2020-09-25 RX ADMIN — METHYLPREDNISOLONE ACETATE 80 MG: 80 INJECTION, SUSPENSION INTRA-ARTICULAR; INTRALESIONAL; INTRAMUSCULAR; SOFT TISSUE at 08:35

## 2020-09-25 NOTE — PROGRESS NOTES
Large Joint Arthrocentesis: L knee  Date/Time: 9/25/2020 8:35 AM  Consent given by: patient  Site marked: site marked  Timeout: Immediately prior to procedure a time out was called to verify the correct patient, procedure, equipment, support staff and site/side marked as required   Supporting Documentation  Indications: pain   Procedure Details  Location: knee - L knee  Needle gauge: 21.  Approach: anteromedial  Medications administered: 2 mL lidocaine (cardiac); 80 mg methylPREDNISolone acetate 80 MG/ML  Patient tolerance: patient tolerated the procedure well with no immediate complications        Patient: John Paul Cisneros  YOB: 1955 65 y.o. male  Medical Record Number: [id number]    Chief Complaints:   Chief Complaint   Patient presents with   • Left Knee - Follow-up, Pain       History of Present Illness:John Paul Cisneros is a 65 y.o. male who presents with complaints of severe left knee pain.  The patient has had increased pain over the last several weeks, denies any injury, describes the knee pain as a moderate constant ache worse with standing walking, better with rest    Allergies:   Allergies   Allergen Reactions   • Darvon [Propoxyphene] Other (See Comments)     Floating in the clouds   • Oxycodone Other (See Comments)     Cramps   • Adhesive Tape Itching       Medications:   Current Outpatient Medications   Medication Sig Dispense Refill   • amLODIPine (NORVASC) 10 MG tablet      • AMLODIPINE BESYLATE PO Take 10 mg by mouth Daily.     • aspirin  MG tablet Take 1 tablet by mouth Every 12 (Twelve) Hours. 60 tablet 1   • atorvastatin (LIPITOR) 80 MG tablet Take 40 mg by mouth Daily.     • febuxostat (ULORIC) 40 MG tablet Take 40 mg by mouth Daily.     • irbesartan (AVAPRO) 150 MG tablet      • meloxicam (MOBIC) 15 MG tablet      • omeprazole (priLOSEC) 40 MG capsule Take 20 mg by mouth Daily.     • sucralfate (CARAFATE) 1 g tablet Take 1 tablet by mouth 4 (Four) Times a Day. (Patient  "taking differently: Take 1 g by mouth 4 (Four) Times a Day As Needed.) 120 tablet 3   • Testosterone (AXIRON) 30 MG/ACT solution Apply  topically to the appropriate area as directed. ONE PUMP DAILY       No current facility-administered medications for this visit.      Facility-Administered Medications Ordered in Other Visits   Medication Dose Route Frequency Provider Last Rate Last Dose   • lactated ringers infusion  100 mL/hr Intravenous Continuous Hermila Sutton CRNA             The following portions of the patient's history were reviewed and updated as appropriate: allergies, current medications, past family history, past medical history, past social history, past surgical history and problem list.    Review of Systems:   A 14 point review of systems was performed. All systems negative except pertinent positives/negative listed in HPI above    Physical Exam:   Vitals:    09/25/20 1358   Temp: 98 °F (36.7 °C)   Weight: (!) 151 kg (333 lb)   Height: 182.9 cm (72\")   PainSc:   4       General: A and O x 3, ASA, NAD    SCLERA:    Normal    DENTITION:   Normal  Skin clear no unusual lesions noted  Left knee patient has no appreciable effusion limited range of motion secondary to pain calf soft and nontender    Radiology:  Xrays previous x-rays of the left knee were reviewed show significant arthritic changes    Assessment/Plan:  Osteoarthritis left knee    Patient discussed treatment options, he would like to proceed with left knee cortisone injection, prior to injection risks were discussed including pain infection.  Patient verbalized understanding would like to proceed with injection he will continue with physical therapy exercises we will see him back as needed  "

## 2020-10-08 ENCOUNTER — OFFICE VISIT (OUTPATIENT)
Dept: SLEEP MEDICINE | Facility: HOSPITAL | Age: 65
End: 2020-10-08

## 2020-10-08 VITALS — HEIGHT: 72 IN | WEIGHT: 315 LBS | BODY MASS INDEX: 42.66 KG/M2

## 2020-10-08 DIAGNOSIS — E66.01 CLASS 3 SEVERE OBESITY DUE TO EXCESS CALORIES WITH SERIOUS COMORBIDITY AND BODY MASS INDEX (BMI) OF 40.0 TO 44.9 IN ADULT (HCC): ICD-10-CM

## 2020-10-08 DIAGNOSIS — G47.22 CIRCADIAN RHYTHM SLEEP DISORDER, ADVANCED SLEEP PHASE TYPE: ICD-10-CM

## 2020-10-08 DIAGNOSIS — G47.26 CIRCADIAN RHYTHM SLEEP DISORDER, SHIFT WORK TYPE: ICD-10-CM

## 2020-10-08 DIAGNOSIS — G47.14 HYPERSOMNIA DUE TO ANOTHER MEDICAL CONDITION: ICD-10-CM

## 2020-10-08 DIAGNOSIS — G47.33 OSA ON CPAP: Primary | ICD-10-CM

## 2020-10-08 DIAGNOSIS — Z99.89 OSA ON CPAP: Primary | ICD-10-CM

## 2020-10-08 PROCEDURE — 99213 OFFICE O/P EST LOW 20 MIN: CPT | Performed by: INTERNAL MEDICINE

## 2020-10-08 PROCEDURE — G0463 HOSPITAL OUTPT CLINIC VISIT: HCPCS

## 2020-10-08 NOTE — PROGRESS NOTES
"Follow Up [facility] Disorders [facility] Note     Chief Complaint:  ANNA     Primary Care Physician: Igor Maher MD    Interval History:   The patient is a 65 y.o. male who I last saw 1 year ago.  He is stable without new complaints.  He goes to bed at 8 PM and awakens at 3:30 AM.  He will use the restroom during the nighttime.  [place] Sleepiness Scale is abnormal at 14.    Review of Systems:    A complete review of systems was done and all were negative with the exception of the above    Social History:    Social History     Socioeconomic History   • Marital status:      Spouse name: Not on file   • Number of children: Not on file   • Years of education: Not on file   • Highest education level: Not on file   Tobacco Use   • Smoking status: Never Smoker   • Smokeless tobacco: Never Used   Substance and Sexual Activity   • Alcohol use: No   • Drug use: No   • Sexual activity: Defer       Allergies:  Darvon [propoxyphene], Oxycodone, and Adhesive tape     Medication Review: His list was reviewed.      Vital Signs:    Vitals:    10/08/20 0826   Weight: (!) 148 kg (327 lb)   Height: 182.9 cm (72\")     Body mass index is 44.35 kg/m².    Physical Exam:    Constitutional:  Well developed 65 y.o. male that appears in no apparent distress.  Awake & oriented times 3.  Normal mood with normal recent and remote memory and normal judgement.  Eyes:  Conjunctivae normal.  Oropharynx: Previously, moist mucous membranes without exudate and a normal sized tongue and class III Mallampati airway, patient is wearing a facemask.     Results Review:  DME is [facility] and he uses nasal interface.  Downloads between 7/10 and 10/7/2020 compliance 100%.  Average usage is 8 hours and 1 minute.  Average AHI is normal without leak.  Average AutoCPAP pressure is 13.5 and his auto CPAP is 12-20.    Impression:   Obstructive sleep apnea adequately treated with auto CPAP with good compliance and usage and persistent complaints of " hypersomnolence.  Circadian rhythm sleep disorder, advanced sleep phase and shiftwork type    Plan:  Good sleep hygiene measures should be maintained.  Weight loss would be beneficial in this patient who is morbidly obese by BMI.  The patient is benefiting from the treatment being provided.     The patient will continue auto CPAP.  A new prescription will be sent to his DME.    The patient will call for any problems and will follow up in 1 year.      John Abernathy MD  Sleep Medicine  10/08/20  08:35 EDT

## 2020-10-21 ENCOUNTER — APPOINTMENT (OUTPATIENT)
Dept: SLEEP MEDICINE | Facility: HOSPITAL | Age: 65
End: 2020-10-21

## 2020-11-11 ENCOUNTER — OFFICE VISIT (OUTPATIENT)
Dept: ORTHOPEDIC SURGERY | Facility: CLINIC | Age: 65
End: 2020-11-11

## 2020-11-11 VITALS — HEIGHT: 72 IN | TEMPERATURE: 97.7 F | WEIGHT: 315 LBS | BODY MASS INDEX: 42.66 KG/M2

## 2020-11-11 DIAGNOSIS — M20.41 HAMMER TOE OF RIGHT FOOT: ICD-10-CM

## 2020-11-11 DIAGNOSIS — R52 PAIN: Primary | ICD-10-CM

## 2020-11-11 DIAGNOSIS — M19.079 ARTHRITIS OF FOOT: ICD-10-CM

## 2020-11-11 PROCEDURE — 73630 X-RAY EXAM OF FOOT: CPT | Performed by: ORTHOPAEDIC SURGERY

## 2020-11-11 PROCEDURE — 99213 OFFICE O/P EST LOW 20 MIN: CPT | Performed by: ORTHOPAEDIC SURGERY

## 2020-11-11 NOTE — PROGRESS NOTES
Foot Follow Up      Patient: John Paul Cisneros    YOB: 1955 65 y.o. male    Chief Complaints: Third toe aches    History of Present Illness:Patient underwent right first phalangeal osteotomy as well as second third fourth and fifth MTP release and PIP joint fusion on 11/12/2019.     During his postoperative period he had questionable infection of the third toe and pin was pulled and infection resolved but he had recurrent deformity of the third toe.     Also during his postoperative period he had an episode in January where he had an injury to his great toe with subsequent fracture proximal to the osteotomy.     He was seen on 5/11/2020 he was using extra-depth shoes and swelling to his great toe had improved to some degree without any specific complaints of pain in the right foot     At that time we discussed treatment options and he did not desire any further surgical treatment as he was not having pain and reviewed with him that further surgical treatment would involve revision arthrodesis of the third toe and extensor tendon lengthening and possible IP joint fusion of the great toe.  He was instructed to watch his feet very carefully for any callus or ulceration    He was last seen on 8/10/2020 and had been using extra-depth shoes with some complaints of intermittent burning feeling in his toes but no pain at the first MTP joint with no ulceration or callus.  That time he did not want anything surgically done for his foot and and was without limitations due to his foot and reviewed with him that numbness in his toes may been from surgery but there was not anything further to do for that.      He is seen back today and has been using extra-depth shoes and reports intermittent aching pain in the right third  Toe but no complaints around the first.  Has not had any ulceration or callus.    He was instructed on continued use of extra-depth shoes and to watch his toes very carefully.  HPI    ROS: Foot  "pain no fevers chills chest pain or shortness of breath  Past Medical History:   Diagnosis Date   • Arthritis    • Colon polyp    • DVT (deep venous thrombosis) (CMS/HCC)    • Elevated cholesterol    • GERD (gastroesophageal reflux disease)    • Hypertension    • PE (pulmonary thromboembolism) (CMS/HCC)    • PONV (postoperative nausea and vomiting)    • Sleep apnea      Physical Exam:   Vitals:    11/11/20 0807   Temp: 97.7 °F (36.5 °C)   Weight: (!) 148 kg (327 lb)   Height: 182.9 cm (72\")   PainSc:   4     Well developed with normal mood.  Nonantalgic gait.  Right foot shows no warmth or erythema.  There is chronic flexion deformity with some under curling of the third toe at the PIP joint but no cock-up at the MTP joint.  There was no clinical sign of an flexion at the third toe.  There was significantly diminished sensation in the lesser toes but brisk capillary refill.  There is flexion deformity of the IP joint of the great toe which is fairly rigid that he was able to flex at the MTP joint and had no complaints of pain in the first toe which was grossly sensate to light touch.  He was nontender along the second fourth or fifth toes.      Radiology: 3 views of the right foot ordered evaluate postoperative alignment reviewed and compared with previous x-rays.  The first proximal phalangeal osteotomy appears to be healed and there is flexion deformity of the IP joint of the great toe.  There is also chronic changes with deformity of the third toe that are unchanged compared with previous x-rays with significant flexion and varus deformity.  The second PIP joint appears well aligned and appears to have fused and the second and third metatarsal osteotomies appear to be healed with some mild degenerative changes of the second and third MTP joints.  The fifth PIP joint is not healed but without change in alignment and the fourth appears to have fused.  There are chronic degenerative changes to the " midfoot.      Assessment/Plan:   1.  Status post right foot surgery as outlined above  2.  Right  first proximal phalanx fracture  3.  Right midfoot and first MTP arthritis  4.  Right talonavicular and naviculocuneiform arthritis    We discussed treatment options with him and he does not want to do anything from a surgical standpoint the only thing that symptomatic at this time is the third toe we discussed treatment options would include revision fusion of the PIP joint possibly with a screw and flexor tenotomy or could even eventually require amputation of a portion of the toe.    The first toes not bothersome to him but if he develops symptoms he would require an IP joint fusion and possible tendon transfer.    Nothing is bothering him enough to consider surgical treatment at this time so he will continue with extra-depth shoes and monitor his feet very carefully and if he has any ulceration or callus to let me know    Offered to see him back in follow-up he said he would just call if any further problems or questions.

## 2021-10-07 ENCOUNTER — OFFICE VISIT (OUTPATIENT)
Dept: SLEEP MEDICINE | Facility: HOSPITAL | Age: 66
End: 2021-10-07

## 2021-10-07 VITALS — BODY MASS INDEX: 42.66 KG/M2 | WEIGHT: 315 LBS | HEIGHT: 72 IN | OXYGEN SATURATION: 97 %

## 2021-10-07 DIAGNOSIS — Z99.89 OSA ON CPAP: Primary | ICD-10-CM

## 2021-10-07 DIAGNOSIS — G47.22 CIRCADIAN RHYTHM SLEEP DISORDER, ADVANCED SLEEP PHASE TYPE: ICD-10-CM

## 2021-10-07 DIAGNOSIS — E66.01 CLASS 3 SEVERE OBESITY DUE TO EXCESS CALORIES WITH SERIOUS COMORBIDITY AND BODY MASS INDEX (BMI) OF 40.0 TO 44.9 IN ADULT (HCC): ICD-10-CM

## 2021-10-07 DIAGNOSIS — G47.26 CIRCADIAN RHYTHM SLEEP DISORDER, SHIFT WORK TYPE: ICD-10-CM

## 2021-10-07 DIAGNOSIS — G47.33 OSA ON CPAP: Primary | ICD-10-CM

## 2021-10-07 DIAGNOSIS — G47.14 HYPERSOMNIA DUE TO ANOTHER MEDICAL CONDITION: ICD-10-CM

## 2021-10-07 PROCEDURE — 99213 OFFICE O/P EST LOW 20 MIN: CPT | Performed by: INTERNAL MEDICINE

## 2021-10-07 PROCEDURE — G0463 HOSPITAL OUTPT CLINIC VISIT: HCPCS

## 2021-10-07 NOTE — PROGRESS NOTES
"Follow Up Sleep Disorders Center Note     Chief Complaint:  ANNA     Primary Care Physician: Igor Maher MD    Interval History:   The patient is a 66 y.o. male  who I last saw 10/8/2020 and that note was reviewed.  The patient is stable without new complaints.  He goes to bed at 8 PM and awakens at 3:15 AM.  He will use the restroom during that time.    Self-administered [place] Sleepiness Scale test results: 13  0-5 Lower normal daytime sleepiness  6-10 Higher normal daytime sleepiness  11-12 Mild, 13-15 Moderate, & 16-24 Severe excessive daytime sleepiness    Review of Systems:    A complete review of systems was done and all were negative with the exception of the above    Social History:    Social History     Socioeconomic History   • Marital status:      Spouse name: Not on file   • Number of children: Not on file   • Years of education: Not on file   • Highest education level: Not on file   Tobacco Use   • Smoking status: Never Smoker   • Smokeless tobacco: Never Used   Substance and Sexual Activity   • Alcohol use: No   • Drug use: No   • Sexual activity: Defer       Allergies:  Darvon [propoxyphene], Oxycodone, and Adhesive tape     Medication Review:  Reviewed.      Vital Signs:    Vitals:    10/07/21 0848   SpO2: 97%   Weight: (!) 153 kg (337 lb)   Height: 182.9 cm (72\")     Body mass index is 45.71 kg/m².    Physical Exam:    Constitutional:  Well developed 66 y.o. male that appears in no apparent distress.  Awake & oriented times 3.  Normal mood with normal recent and remote memory and normal judgement.  Eyes:  Conjunctivae normal.  Oropharynx: Previously, moist mucous membranes without exudate and a normal sized tongue and class III Mallampati airway, patient is wearing a facemask.     Downloaded PAP Data Reviewed For Compliance:  DME is [facility] and he uses nasal pillows.  Downloads between 7/8 and 10/5/2021 compliance 100%.  Average usage is 7 hours and 18 minutes.  Average AHI is normal " without leak.  Average auto CPAP pressure is 13 and his auto CPAP is 12-20.    I have reviewed the above results and compared them with the patient's last downloads and reviewed with the patient.    Impression:   Obstructive sleep apnea adequately treated with auto CPAP. The patient appears to be at goal with good compliance and usage. The patient has persistent complaints of hypersomnolence.  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  Weight loss would be beneficial in this patient who is morbidly obese by BMI.      After evaluating the patient and assessing results available, the patient is benefiting from the treatment being provided.     The patient will continue auto CPAP.  After clinical evaluation and review of downloads, I recommend no changes to the patient's pressures.  A new prescription will be sent to the patient's DME.    Additionally, Ramona recall discussed.  The patient has registered his device.  He does not use an ozone .    I answered all of the patient's questions.  The patient will call for any problems and will follow up in 1 year.      John Abernathy MD  Sleep Medicine  10/07/21  08:54 EDT

## 2022-05-02 ENCOUNTER — TELEPHONE (OUTPATIENT)
Dept: ORTHOPEDIC SURGERY | Facility: CLINIC | Age: 67
End: 2022-05-02

## 2022-05-02 NOTE — TELEPHONE ENCOUNTER
Provider: DYLON  Caller: KATYA CHANEY  Relationship to Patient: PATIENT  Pharmacy: N/A  Phone Number: [phone]  Reason for Call: PATIENT CALLING TO R/S GLENN INJ LT KNEE  PATIENT ALSO HAVING ISSUES WITH RT KNEE REPLACED 5 YRS AGO

## 2022-05-12 ENCOUNTER — OFFICE VISIT (OUTPATIENT)
Dept: ORTHOPEDIC SURGERY | Facility: CLINIC | Age: 67
End: 2022-05-12

## 2022-05-12 VITALS — WEIGHT: 315 LBS | TEMPERATURE: 97.1 F | HEIGHT: 72 IN | BODY MASS INDEX: 42.66 KG/M2

## 2022-05-12 DIAGNOSIS — M25.562 ACUTE PAIN OF BOTH KNEES: ICD-10-CM

## 2022-05-12 DIAGNOSIS — M25.561 ACUTE PAIN OF BOTH KNEES: ICD-10-CM

## 2022-05-12 DIAGNOSIS — R52 PAIN: Primary | ICD-10-CM

## 2022-05-12 DIAGNOSIS — M17.12 PRIMARY OSTEOARTHRITIS OF LEFT KNEE: ICD-10-CM

## 2022-05-12 PROCEDURE — 20610 DRAIN/INJ JOINT/BURSA W/O US: CPT | Performed by: NURSE PRACTITIONER

## 2022-05-12 PROCEDURE — 99214 OFFICE O/P EST MOD 30 MIN: CPT | Performed by: NURSE PRACTITIONER

## 2022-05-12 PROCEDURE — 73562 X-RAY EXAM OF KNEE 3: CPT | Performed by: NURSE PRACTITIONER

## 2022-05-12 RX ORDER — IRBESARTAN 300 MG/1
300 TABLET ORAL DAILY
COMMUNITY
Start: 2022-01-12 | End: 2022-12-08

## 2022-05-12 RX ORDER — METHYLPREDNISOLONE ACETATE 80 MG/ML
80 INJECTION, SUSPENSION INTRA-ARTICULAR; INTRALESIONAL; INTRAMUSCULAR; SOFT TISSUE
Status: COMPLETED | OUTPATIENT
Start: 2022-05-12 | End: 2022-05-12

## 2022-05-12 RX ORDER — LIDOCAINE HYDROCHLORIDE 20 MG/ML
2 INJECTION, SOLUTION EPIDURAL; INFILTRATION; INTRACAUDAL; PERINEURAL
Status: COMPLETED | OUTPATIENT
Start: 2022-05-12 | End: 2022-05-12

## 2022-05-12 RX ADMIN — METHYLPREDNISOLONE ACETATE 80 MG: 80 INJECTION, SUSPENSION INTRA-ARTICULAR; INTRALESIONAL; INTRAMUSCULAR; SOFT TISSUE at 10:43

## 2022-05-12 RX ADMIN — LIDOCAINE HYDROCHLORIDE 2 ML: 20 INJECTION, SOLUTION EPIDURAL; INFILTRATION; INTRACAUDAL; PERINEURAL at 10:43

## 2022-05-12 NOTE — PROGRESS NOTES
Patient: John Paul Cisneros  YOB: 1955 66 y.o. male  Medical Record Number: [id number]    Chief Complaints:   Chief Complaint   Patient presents with   • Right Knee - Pain   • Left Knee - Pain       History of Present Illness:John Paul Cisneros is a 66 y.o. male who presents with complaints of bilateral knee pain left greater than right.  Patient had previous right total knee replacement in 2015 but has had soreness in the right knee especially with driving and going up and down stairs for the last several months.  With regards to the left knee that is a constant sharp stabbing type pain worse with any standing walking, only slightly better with rest.    Allergies:   Allergies   Allergen Reactions   • Darvon [Propoxyphene] Other (See Comments)     Floating in the clouds   • Oxycodone Other (See Comments)     Cramps   • Adhesive Tape Itching       Medications:   Current Outpatient Medications   Medication Sig Dispense Refill   • amLODIPine (NORVASC) 10 MG tablet      • AMLODIPINE BESYLATE PO Take 10 mg by mouth Daily.     • aspirin  MG tablet Take 1 tablet by mouth Every 12 (Twelve) Hours. 60 tablet 1   • atorvastatin (LIPITOR) 80 MG tablet Take 40 mg by mouth Daily.     • febuxostat (ULORIC) 40 MG tablet Take 40 mg by mouth Daily.     • irbesartan (AVAPRO) 300 MG tablet Take 300 mg by mouth Daily.     • meloxicam (MOBIC) 15 MG tablet      • omeprazole (priLOSEC) 40 MG capsule Take 20 mg by mouth Daily.     • sucralfate (CARAFATE) 1 g tablet Take 1 tablet by mouth 4 (Four) Times a Day. (Patient taking differently: Take 1 g by mouth 4 (Four) Times a Day As Needed.) 120 tablet 3   • Testosterone 30 MG/ACT solution Apply  topically to the appropriate area as directed. ONE PUMP DAILY     • irbesartan (AVAPRO) 150 MG tablet        No current facility-administered medications for this visit.     Facility-Administered Medications Ordered in Other Visits   Medication Dose Route Frequency Provider Last Rate  "Last Admin   • lactated ringers infusion  100 mL/hr Intravenous Continuous Sutton Hermila MCMULLEN, CRNA   New Bag at 11/12/19 0848         The following portions of the patient's history were reviewed and updated as appropriate: allergies, current medications, past family history, past medical history, past social history, past surgical history and problem list.    Review of Systems:   A 14 point review of systems was performed. All systems negative except pertinent positives/negative listed in HPI above    Physical Exam:   Vitals:    05/12/22 1026   Temp: 97.1 °F (36.2 °C)   TempSrc: Temporal   Weight: (!) 153 kg (337 lb)   Height: 182.9 cm (72.01\")   PainSc:   4       General: A and O x 3, ASA, NAD    SCLERA:    Normal    Skin clear no unusual lesions noted  Left knee the patient has trace amount of effusion noted with 110 degrees flexion neutral extension with a positive Rasheed negative Lachman he has significant patellofemoral crepitus noted calf soft and nontender  Right knee patient has well-healed surgical incision noted excellent range of motion with no instability he does however have patellofemoral crepitus noted    Radiology:  Xrays 3views (ap,lateral, sunrise) bilateral knees were ordered and reviewed today secondary to pain patient has a well-placed well-positioned right total knee replacement, however he does have arthritic changes noted involving the patella since it was not resurfaced.  With regards to the left knee he has bone-on-bone end-stage osteoarthritis with cyst and spur formation.  Compared to views are unchanged    Assessment/Plan: Patellofemoral arthritis right knee, status post right TKA  Osteoarthritis left knee--increasing pain    Patient I discussed options unfortunately it is too risky to inject his right knee given the fact its been replaced, I think the most important thing at this point is weight loss.  We discussed that in great detail and patient verbalized understanding.  We will " proceed with left knee cortisone injection, physical therapy for both knees, and I will see him back in 3 months if needed.  I will give him samples of Pennsaid gel as well he will let me know if you would like a prescription    Large Joint Arthrocentesis: L knee  Date/Time: 5/12/2022 10:43 AM  Consent given by: patient  Site marked: site marked  Timeout: Immediately prior to procedure a time out was called to verify the correct patient, procedure, equipment, support staff and site/side marked as required   Supporting Documentation  Indications: pain and joint swelling   Procedure Details  Location: knee - L knee  Preparation: Patient was prepped and draped in the usual sterile fashion  Needle size: 22 G  Approach: anterolateral  Medications administered: 80 mg methylPREDNISolone acetate 80 MG/ML; 2 mL lidocaine PF 2% 2 %  Patient tolerance: patient tolerated the procedure well with no immediate complications            Joanne Ortiz, APRN  5/12/2022

## 2022-05-25 ENCOUNTER — TELEPHONE (OUTPATIENT)
Dept: ORTHOPEDIC SURGERY | Facility: CLINIC | Age: 67
End: 2022-05-25

## 2022-05-25 NOTE — TELEPHONE ENCOUNTER
Patient called and wants Pennsaid Prescription. However Medicare does not cover Pennsaid. Would you want to try the 3% diclofenac gel?

## 2022-05-26 RX ORDER — DICLOFENAC SODIUM 30 MG/G
GEL TOPICAL 2 TIMES DAILY
Qty: 112 G | Refills: 11 | Status: SHIPPED | OUTPATIENT
Start: 2022-05-26

## 2022-09-02 ENCOUNTER — PREP FOR SURGERY (OUTPATIENT)
Dept: OTHER | Facility: HOSPITAL | Age: 67
End: 2022-09-02

## 2022-09-02 ENCOUNTER — OFFICE VISIT (OUTPATIENT)
Dept: ORTHOPEDIC SURGERY | Facility: CLINIC | Age: 67
End: 2022-09-02

## 2022-09-02 VITALS — TEMPERATURE: 98 F | RESPIRATION RATE: 16 BRPM | HEIGHT: 72 IN | WEIGHT: 315 LBS | BODY MASS INDEX: 42.66 KG/M2

## 2022-09-02 DIAGNOSIS — M17.12 PRIMARY OSTEOARTHRITIS OF LEFT KNEE: Primary | ICD-10-CM

## 2022-09-02 PROCEDURE — 99214 OFFICE O/P EST MOD 30 MIN: CPT | Performed by: NURSE PRACTITIONER

## 2022-09-02 RX ORDER — POVIDONE-IODINE 10 MG/ML
SOLUTION TOPICAL ONCE
Status: CANCELLED | OUTPATIENT
Start: 2022-12-12 | End: 2022-09-02

## 2022-09-02 RX ORDER — CEFAZOLIN SODIUM 2 G/100ML
2 INJECTION, SOLUTION INTRAVENOUS ONCE
Status: CANCELLED | OUTPATIENT
Start: 2022-12-12 | End: 2022-09-02

## 2022-09-02 RX ORDER — CHLORHEXIDINE GLUCONATE 500 MG/1
CLOTH TOPICAL 2 TIMES DAILY
Status: CANCELLED | OUTPATIENT
Start: 2022-09-02

## 2022-09-02 RX ORDER — PREGABALIN 75 MG/1
150 CAPSULE ORAL ONCE
Status: CANCELLED | OUTPATIENT
Start: 2022-12-12 | End: 2022-09-02

## 2022-09-02 RX ORDER — MELOXICAM 15 MG/1
15 TABLET ORAL ONCE
Status: CANCELLED | OUTPATIENT
Start: 2022-12-12 | End: 2022-09-02

## 2022-09-02 NOTE — PROGRESS NOTES
Patient: John Paul Cisneros  YOB: 1955 67 y.o. male  Medical Record Number: [id number]    Chief Complaints:   Chief Complaint   Patient presents with   • Left Knee - Follow-up   • Right Knee - Follow-up       History of Present Illness:John Paul Cisneros is a 67 y.o. male who presents with complaints of severe left knee pain.  He has tried and failed all conservative measures including injections, physical therapy, anti-inflammatories, he would like to proceed with surgery.  He had previous right total knee replacement had done great with that, patient would like to proceed with surgery.    Allergies:   Allergies   Allergen Reactions   • Darvon [Propoxyphene] Other (See Comments)     Floating in the clouds   • Oxycodone Other (See Comments)     Cramps   • Indapamide Cough   • Adhesive Tape Itching   • Wound Dressing Adhesive Itching       Medications:   Current Outpatient Medications   Medication Sig Dispense Refill   • amLODIPine (NORVASC) 10 MG tablet      • aspirin  MG tablet Take 1 tablet by mouth Every 12 (Twelve) Hours. 60 tablet 1   • atorvastatin (LIPITOR) 80 MG tablet Take 40 mg by mouth Daily.     • Diclofenac Sodium 3 % gel gel Apply  topically to the appropriate area as directed 2 (Two) Times a Day. 112 g 11   • febuxostat (ULORIC) 40 MG tablet Take 40 mg by mouth Daily.     • irbesartan (AVAPRO) 300 MG tablet Take 300 mg by mouth Daily.     • meloxicam (MOBIC) 15 MG tablet      • omeprazole (priLOSEC) 40 MG capsule Take 20 mg by mouth Daily.     • sucralfate (CARAFATE) 1 g tablet Take 1 tablet by mouth 4 (Four) Times a Day. (Patient taking differently: Take 1 g by mouth 4 (Four) Times a Day As Needed.) 120 tablet 3   • Testosterone 30 MG/ACT solution Apply  topically to the appropriate area as directed. ONE PUMP DAILY     • AMLODIPINE BESYLATE PO Take 10 mg by mouth Daily.     • irbesartan (AVAPRO) 150 MG tablet        No current facility-administered medications for this visit.  "    Facility-Administered Medications Ordered in Other Visits   Medication Dose Route Frequency Provider Last Rate Last Admin   • lactated ringers infusion  100 mL/hr Intravenous Continuous Hermila Sutton, CELIO   New Bag at 11/12/19 0848         The following portions of the patient's history were reviewed and updated as appropriate: allergies, current medications, past family history, past medical history, past social history, past surgical history and problem list.    Review of Systems:   A 14 point review of systems was performed. All systems negative except pertinent positives/negative listed in HPI above    Physical Exam:   Vitals:    09/02/22 1551   Resp: 16   Temp: 98 °F (36.7 °C)   Weight: (!) 149 kg (328 lb)   Height: 182.9 cm (72\")       General: A and O x 3, ASA, NAD    SCLERA:    Normal    DENTITION:   Normal  Body mass index is 44.48 kg/m².  Skin clear no unusual lesions noted  Left knee patient has trace amount of effusion noted with 120 degrees flexion neutral in extension with a positive Rasheed negative Lockman calf soft and nontender       Radiology:  Xrays 3views (ap,lateral, sunrise) left knee were reviewed that were done previously show bone-on-bone end-stage osteoarthritis with cyst and spur formation.    Assessment/Plan: End-stage osteoarthritis left knee with severe pain    The patient and I discussed options, he would like to proceed with left total knee replacement overnight stay.  In the meantime he will continue work on weight loss and physical therapy exercises.  Continuation of conservative management vs. TKA discussed.  The patient wishes to proceed with total knee replacement.  At this point the patient has failed the full compliment of conservative treatment and stating complete understanding of the risks/benefits/ anternatives wishes to proceed with surgical treatment.    Risk and benefits of surgery were reviewed.  Including, but not limited to, blood clots or pulmonary embolism, " anesthesia risk, infection, fracture, skin/leg numbness, persistent pain/crepitance/popping/catching, failure of the implant, need for future surgeries, hematoma, possible nerve or blood vessel injury, need for transfusion, and potential risk of stroke,heart attack or death, among others.  The patient understands and wishes to proceed.     It was explained that if tissue has been repaired or reconstructed, there is also an increased chance of failure which may require further management.  Following the completion of the discussion, the patient expressed understanding of this planned course of care, all their questions were answered and consent will be obtained preoperatively.    Operative Plan: Smith and Nephshazia Oxinium Total Knee Replacement an overnight staywith home health rehab        Joanne Ortiz, APRN  9/2/2022

## 2022-09-19 PROBLEM — M17.12 PRIMARY OSTEOARTHRITIS OF LEFT KNEE: Status: ACTIVE | Noted: 2022-09-19

## 2022-10-06 ENCOUNTER — OFFICE VISIT (OUTPATIENT)
Dept: SLEEP MEDICINE | Facility: HOSPITAL | Age: 67
End: 2022-10-06

## 2022-10-06 VITALS — HEIGHT: 72 IN | HEART RATE: 65 BPM | WEIGHT: 315 LBS | OXYGEN SATURATION: 97 % | BODY MASS INDEX: 42.66 KG/M2

## 2022-10-06 DIAGNOSIS — G47.26 CIRCADIAN RHYTHM SLEEP DISORDER, SHIFT WORK TYPE: ICD-10-CM

## 2022-10-06 DIAGNOSIS — E66.01 CLASS 3 SEVERE OBESITY DUE TO EXCESS CALORIES WITH SERIOUS COMORBIDITY AND BODY MASS INDEX (BMI) OF 40.0 TO 44.9 IN ADULT: ICD-10-CM

## 2022-10-06 DIAGNOSIS — G47.14 HYPERSOMNIA DUE TO ANOTHER MEDICAL CONDITION: ICD-10-CM

## 2022-10-06 DIAGNOSIS — Z99.89 OSA ON CPAP: Primary | ICD-10-CM

## 2022-10-06 DIAGNOSIS — G47.22 CIRCADIAN RHYTHM SLEEP DISORDER, ADVANCED SLEEP PHASE TYPE: ICD-10-CM

## 2022-10-06 DIAGNOSIS — G47.33 OSA ON CPAP: Primary | ICD-10-CM

## 2022-10-06 PROCEDURE — 99213 OFFICE O/P EST LOW 20 MIN: CPT | Performed by: INTERNAL MEDICINE

## 2022-10-06 PROCEDURE — G0463 HOSPITAL OUTPT CLINIC VISIT: HCPCS

## 2022-10-06 NOTE — PROGRESS NOTES
"Follow Up [facility] Note     Chief Complaint:  ANNA     Primary Care Physician: Igor Maher MD    Interval History:   The patient is a 67 y.o. male  who I last saw 10/7/2021 and that note was reviewed.  The patient reports he is doing well without new complaints.  He goes to bed at 8 PM and awakens at 5:30 AM.  He will use the restroom during that time.    Review of Systems:    A complete review of systems was done and all were negative with the exception of the above    Social History:    Social History     Socioeconomic History   • Marital status:    Tobacco Use   • Smoking status: Never Smoker   • Smokeless tobacco: Never Used   Substance and Sexual Activity   • Alcohol use: No   • Drug use: No   • Sexual activity: Defer       Allergies:  Darvon [propoxyphene], Oxycodone, Indapamide, Adhesive tape, and Wound dressing adhesive     Medication Review:  Reviewed.      Vital Signs:    Vitals:    10/06/22 0700   Pulse: 65   SpO2: 97%   Weight: (!) 150 kg (330 lb)   Height: 182.9 cm (72\")     Body mass index is 44.76 kg/m².    Physical Exam:    Constitutional:  Well developed 67 y.o. male that appears in no apparent distress.  Awake & oriented times 3.  Normal mood with normal recent and remote memory and normal judgement.  Eyes:  Conjunctivae normal.  Oropharynx: Previously, moist mucous membranes without exudate and a normal-sized tongue and class III Mallampati airway, patient is wearing a facemask.    Self-administered [place] Sleepiness Scale test results: 14, previously 13  0-5 Lower normal daytime sleepiness  6-10 Higher normal daytime sleepiness  11-12 Mild, 13-15 Moderate, & 16-24 Severe excessive daytime sleepiness     Downloaded PAP Data Reviewed For Compliance:  DME is [facility] then he uses nasal pillows.  Downloads between 7/6 and 10/3/2022 compliance 99%.  Average usage is 9 hours 11 minutes.  Average AHI is normal at 5.2 without leak.  Average auto CPAP pressure is 15.4 and his auto " CPAP is 12-20    I have reviewed the above results and compared them with the patient's last downloads and reviewed with the patient.    Impression:   Obstructive sleep apnea adequately treated with auto CPAP. The patient appears to be at goal with good compliance and usage. The patient has complaints of hypersomnolence.  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  Weight loss would be beneficial in this patient who has class III severe obesity by Body mass index is 44.76 kg/m²..      After evaluating the patient and assessing results available, the patient is benefiting from the treatment being provided.     The patient will continue auto CPAP.  After clinical evaluation and review of downloads, I recommend no changes to the patient's pressures.  A new prescription will be sent to the patient's DME.    The patient reports that his [employer] is not on recall per phone conversation that he had with Ramona    I answered all of the patient's questions.  The patient will call for any problems and will follow up in 1 year.      John Abernathy MD  Sleep Medicine  10/06/22  08:30 EDT

## 2022-10-28 ENCOUNTER — TELEPHONE (OUTPATIENT)
Dept: SLEEP MEDICINE | Facility: HOSPITAL | Age: 67
End: 2022-10-28

## 2022-10-31 ENCOUNTER — TELEPHONE (OUTPATIENT)
Dept: SLEEP MEDICINE | Facility: HOSPITAL | Age: 67
End: 2022-10-31

## 2022-11-18 ENCOUNTER — TELEPHONE (OUTPATIENT)
Dept: SLEEP MEDICINE | Facility: HOSPITAL | Age: 67
End: 2022-11-18

## 2022-12-08 ENCOUNTER — PRE-ADMISSION TESTING (OUTPATIENT)
Dept: PREADMISSION TESTING | Facility: HOSPITAL | Age: 67
End: 2022-12-08

## 2022-12-08 ENCOUNTER — OFFICE VISIT (OUTPATIENT)
Dept: ORTHOPEDIC SURGERY | Facility: CLINIC | Age: 67
End: 2022-12-08

## 2022-12-08 VITALS
WEIGHT: 315 LBS | DIASTOLIC BLOOD PRESSURE: 84 MMHG | TEMPERATURE: 96.6 F | HEIGHT: 73 IN | BODY MASS INDEX: 41.75 KG/M2 | SYSTOLIC BLOOD PRESSURE: 140 MMHG

## 2022-12-08 VITALS
TEMPERATURE: 98.1 F | WEIGHT: 315 LBS | HEIGHT: 73 IN | BODY MASS INDEX: 41.75 KG/M2 | DIASTOLIC BLOOD PRESSURE: 74 MMHG | RESPIRATION RATE: 18 BRPM | SYSTOLIC BLOOD PRESSURE: 172 MMHG | HEART RATE: 55 BPM | OXYGEN SATURATION: 96 %

## 2022-12-08 DIAGNOSIS — M17.12 PRIMARY OSTEOARTHRITIS OF LEFT KNEE: Primary | ICD-10-CM

## 2022-12-08 DIAGNOSIS — M17.12 PRIMARY OSTEOARTHRITIS OF LEFT KNEE: ICD-10-CM

## 2022-12-08 LAB
ANION GAP SERPL CALCULATED.3IONS-SCNC: 11.8 MMOL/L (ref 5–15)
BUN SERPL-MCNC: 18 MG/DL (ref 8–23)
BUN/CREAT SERPL: 19.8 (ref 7–25)
CALCIUM SPEC-SCNC: 9.2 MG/DL (ref 8.6–10.5)
CHLORIDE SERPL-SCNC: 104 MMOL/L (ref 98–107)
CO2 SERPL-SCNC: 23.2 MMOL/L (ref 22–29)
CREAT SERPL-MCNC: 0.91 MG/DL (ref 0.76–1.27)
DEPRECATED RDW RBC AUTO: 39.6 FL (ref 37–54)
EGFRCR SERPLBLD CKD-EPI 2021: 91.2 ML/MIN/1.73
ERYTHROCYTE [DISTWIDTH] IN BLOOD BY AUTOMATED COUNT: 12.8 % (ref 12.3–15.4)
GLUCOSE SERPL-MCNC: 93 MG/DL (ref 65–99)
HCT VFR BLD AUTO: 43.4 % (ref 37.5–51)
HGB BLD-MCNC: 14.2 G/DL (ref 13–17.7)
MCH RBC QN AUTO: 28.1 PG (ref 26.6–33)
MCHC RBC AUTO-ENTMCNC: 32.7 G/DL (ref 31.5–35.7)
MCV RBC AUTO: 85.8 FL (ref 79–97)
PLATELET # BLD AUTO: 357 10*3/MM3 (ref 140–450)
PMV BLD AUTO: 9.7 FL (ref 6–12)
POTASSIUM SERPL-SCNC: 4.6 MMOL/L (ref 3.5–5.2)
QT INTERVAL: 389 MS
RBC # BLD AUTO: 5.06 10*6/MM3 (ref 4.14–5.8)
SODIUM SERPL-SCNC: 139 MMOL/L (ref 136–145)
WBC NRBC COR # BLD: 8.3 10*3/MM3 (ref 3.4–10.8)

## 2022-12-08 PROCEDURE — 85027 COMPLETE CBC AUTOMATED: CPT

## 2022-12-08 PROCEDURE — 80048 BASIC METABOLIC PNL TOTAL CA: CPT

## 2022-12-08 PROCEDURE — 93005 ELECTROCARDIOGRAM TRACING: CPT

## 2022-12-08 PROCEDURE — 77077 JOINT SURVEY SINGLE VIEW: CPT | Performed by: ORTHOPAEDIC SURGERY

## 2022-12-08 PROCEDURE — S0260 H&P FOR SURGERY: HCPCS | Performed by: NURSE PRACTITIONER

## 2022-12-08 PROCEDURE — 93010 ELECTROCARDIOGRAM REPORT: CPT | Performed by: STUDENT IN AN ORGANIZED HEALTH CARE EDUCATION/TRAINING PROGRAM

## 2022-12-08 PROCEDURE — 36415 COLL VENOUS BLD VENIPUNCTURE: CPT

## 2022-12-08 PROCEDURE — 73562 X-RAY EXAM OF KNEE 3: CPT | Performed by: NURSE PRACTITIONER

## 2022-12-08 RX ORDER — IRBESARTAN 300 MG/1
1 TABLET ORAL DAILY
COMMUNITY
Start: 2022-10-26

## 2022-12-08 RX ORDER — MELOXICAM 15 MG/1
1 TABLET ORAL DAILY
COMMUNITY
Start: 2022-10-26 | End: 2022-12-08

## 2022-12-08 RX ORDER — ACYCLOVIR 200 MG/1
200 CAPSULE ORAL AS NEEDED
COMMUNITY
Start: 2022-10-26

## 2022-12-08 RX ORDER — METFORMIN HYDROCHLORIDE 500 MG/1
500 TABLET, EXTENDED RELEASE ORAL EVERY EVENING
COMMUNITY
Start: 2022-10-26

## 2022-12-08 RX ORDER — CHLORHEXIDINE GLUCONATE 500 MG/1
CLOTH TOPICAL 2 TIMES DAILY
Status: ACTIVE | OUTPATIENT
Start: 2022-12-08

## 2022-12-08 RX ORDER — CHLORHEXIDINE GLUCONATE 500 MG/1
CLOTH TOPICAL
COMMUNITY
End: 2022-12-13

## 2022-12-08 RX ORDER — CARVEDILOL 12.5 MG/1
12.5 TABLET ORAL 2 TIMES DAILY WITH MEALS
COMMUNITY
Start: 2022-10-26

## 2022-12-08 RX ORDER — ACETAMINOPHEN 500 MG
500 TABLET ORAL EVERY 6 HOURS PRN
COMMUNITY

## 2022-12-08 RX ORDER — FEBUXOSTAT 40 MG/1
1 TABLET, FILM COATED ORAL DAILY
COMMUNITY
Start: 2022-08-04

## 2022-12-08 RX ORDER — AMLODIPINE BESYLATE 10 MG/1
1 TABLET ORAL DAILY
COMMUNITY
Start: 2022-09-12

## 2022-12-08 ASSESSMENT — KOOS JR
KOOS JR SCORE: 22
KOOS JR SCORE: 31.307

## 2022-12-08 NOTE — DISCHARGE INSTRUCTIONS
ARRIVE DAY OF SURGERY AS INFORMED BY DR VALERA OFFICE        Take the following medications the morning of surgery:  AMLODIPINE & CARVEDILOL      If you are on prescription narcotic pain medication to control your pain you may also take that medication the morning of surgery.    General Instructions:  Do not eat solid food after midnight the night before surgery.  You may drink clear liquids day of surgery but must stop at least one hour before your hospital arrival time.  It is beneficial for you to have a clear drink that contains carbohydrates the day of surgery.  We suggest a 12 to 20 ounce bottle of Gatorade or Powerade for non-diabetic patients or a 12 to 20 ounce bottle of G2 or Powerade Zero for diabetic patients. (Pediatric patients, are not advised to drink a 12 to 20 ounce carbohydrate drink)    Clear liquids are liquids you can see through.  Nothing red in color.     Plain water                               Sports drinks  Sodas                                   Gelatin (Jell-O)  Fruit juices without pulp such as white grape juice and apple juice  Popsicles that contain no fruit or yogurt  Tea or coffee (no cream or milk added)  Gatorade / Powerade  G2 / Powerade Zero    Infants may have breast milk up to four hours before surgery.  Infants drinking formula may drink formula up to six hours before surgery.   Patients who avoid smoking, chewing tobacco and alcohol for 4 weeks prior to surgery have a reduced risk of post-operative complications.  Quit smoking as many days before surgery as you can.  Do not smoke, use chewing tobacco or drink alcohol the day of surgery.   If applicable bring your C-PAP/ BI-PAP machine.  Bring any papers given to you in the doctor’s office.  Wear clean comfortable clothes.  Do not wear contact lenses, false eyelashes or make-up.  Bring a case for your glasses.   Bring crutches or walker if applicable.  Remove all piercings.  Leave jewelry and any other valuables at  home.  Hair extensions with metal clips must be removed prior to surgery.  The Pre-Admission Testing nurse will instruct you to bring medications if unable to obtain an accurate list in Pre-Admission Testing.            Preventing a Surgical Site Infection:  For 2 to 3 days before surgery, avoid shaving with a razor because the razor can irritate skin and make it easier to develop an infection.    Any areas of open skin can increase the risk of a post-operative wound infection by allowing bacteria to enter and travel throughout the body.  Notify your surgeon if you have any skin wounds / rashes even if it is not near the expected surgical site.  The area will need assessed to determine if surgery should be delayed until it is healed.  The night prior to surgery shower using a fresh bar of anti-bacterial soap (such as Dial) and clean washcloth.  Sleep in a clean bed with clean clothing.  Do not allow pets to sleep with you.  Shower on the morning of surgery using a fresh bar of anti-bacterial soap (such as Dial) and clean washcloth.  Dry with a clean towel and dress in clean clothing.  Ask your surgeon if you will be receiving antibiotics prior to surgery.  Make sure you, your family, and all healthcare providers clean their hands with soap and water or an alcohol based hand  before caring for you or your wound.    Day of surgery:  Your arrival time is approximately two hours before your scheduled surgery time.  Upon arrival, a Pre-op nurse and Anesthesiologist will review your health history, obtain vital signs, and answer questions you may have.  The only belongings needed at this time will be a list of your home medications and if applicable your C-PAP/BI-PAP machine.  A Pre-op nurse will start an IV and you may receive medication in preparation for surgery, including something to help you relax.     Please be aware that surgery does come with discomfort.  We want to make every effort to control your  discomfort so please discuss any uncontrolled symptoms with your nurse.   Your doctor will most likely have prescribed pain medications.      If you are going home after surgery you will receive individualized written care instructions before being discharged.  A responsible adult must drive you to and from the hospital on the day of your surgery and stay with you for 24 hours.  Discharge prescriptions can be filled by the hospital pharmacy during regular pharmacy hours.  If you are having surgery late in the day/evening your prescription may be e-prescribed to your pharmacy.  Please verify your pharmacy hours or chose a 24 hour pharmacy to avoid not having access to your prescription because your pharmacy has closed for the day.    If you are staying overnight following surgery, you will be transported to your hospital room following the recovery period.  [facility] has all private rooms.    If you have any questions please call Pre-Admission Testing at [phone].  Deductibles and co-payments are collected on the day of service. Please be prepared to pay the required co-pay, deductible or deposit on the day of service as defined by your plan.    Call your surgeon immediately if you experience any of the following symptoms:  Sore Throat  Shortness of Breath or difficulty breathing  Cough  Chills  Body soreness or muscle pain  Headache  Fever  New loss of taste or smell  Do not arrive for your surgery ill.  Your procedure will need to be rescheduled to another time.  You will need to call your physician before the day of surgery to avoid any unnecessary exposure to hospital staff as well as other patients.     CHLORHEXIDINE CLOTH INSTRUCTIONS  The morning of surgery follow these instructions using the Chlorhexidine cloths you've been given.  These steps reduce bacteria on the body.  Do not use the cloths near your eyes, ears mouth, genitalia or on open wounds.  Throw the cloths away after use but  do not try to flush them down a toilet.      Open and remove one cloth at a time from the package.    Leave the cloth unfolded and begin the bathing.  Massage the skin with the cloths using gentle pressure to remove bacteria.  Do not scrub harshly.   Follow the steps below with one 2% CHG cloth per area (6 total cloths).  One cloth for neck, shoulders and chest.  One cloth for both arms, hands, fingers and underarms (do underarms last).  One cloth for the abdomen followed by groin.  One cloth for right leg and foot including between the toes.  One cloth for left leg and foot including between the toes.  The last cloth is to be used for the back of the neck, back and buttocks.    Allow the CHG to air dry 3 minutes on the skin which will give it time to work and decrease the chance of irritation.  The skin may feel sticky until it is dry.  Do not rinse with water or any other liquid or you will lose the beneficial effects of the CHG.  If mild skin irritation occurs, do rinse the skin to remove the CHG.  Report this to the nurse at time of admission.  Do not apply lotions, creams, ointments, deodorants or perfumes after using the clothes. Dress in clean clothes before coming to the hospital.

## 2022-12-08 NOTE — H&P
Patient: John Paul Cisneros    Date of Admission: 12/12/2022    YOB: 1955    Medical Record Number: [id number]    Admitting Physician: Dr. Wilfred Serrato    Reason for Admission: End Stage Left Knee OA    History of Present Illness: 67 y.o. male presents with severe end stage knee osteoarthritis which has not been responsive to the full compliment of conservative measures. Despite conservative attempts, there is still severe, constant activity-limiting pain. Given the severity of the pain, the patient has elected to proceed with knee replacement.    Allergies:   Allergies   Allergen Reactions   • Oxycodone Other (See Comments)     ABDOMINAL Cramps   • Propoxyphene Other (See Comments)     Floating in the clouds   • Adhesive Tape Hives, Itching and Rash     EKG PATCHES   • Indapamide Cough         Current Medications:  Home Medications:    Current Outpatient Medications on File Prior to Visit   Medication Sig   • atorvastatin (LIPITOR) 80 MG tablet Take 40 mg by mouth Daily.   • Diclofenac Sodium 3 % gel gel Apply  topically to the appropriate area as directed 2 (Two) Times a Day.   • meloxicam (MOBIC) 15 MG tablet Take 15 mg by mouth Daily.   • omeprazole (priLOSEC) 40 MG capsule Take 40 mg by mouth Daily.   • sucralfate (CARAFATE) 1 g tablet Take 1 tablet by mouth 4 (Four) Times a Day. (Patient taking differently: Take 1 g by mouth 4 (Four) Times a Day As Needed.)   • Testosterone 30 MG/ACT solution Apply  topically to the appropriate area as directed. ONE PUMP DAILY   • [DISCONTINUED] amLODIPine (NORVASC) 10 MG tablet    • [DISCONTINUED] AMLODIPINE BESYLATE PO Take 10 mg by mouth Daily.   • [DISCONTINUED] aspirin  MG tablet Take 1 tablet by mouth Every 12 (Twelve) Hours.   • [DISCONTINUED] febuxostat (ULORIC) 40 MG tablet Take 40 mg by mouth Daily.   • [DISCONTINUED] irbesartan (AVAPRO) 150 MG tablet    • [DISCONTINUED] irbesartan (AVAPRO) 300 MG tablet Take 300 mg by mouth Daily.     Current  Facility-Administered Medications on File Prior to Visit   Medication   • lactated ringers infusion     PRN Meds:.    PMH:     Past Medical History:   Diagnosis Date   • Arthritis    • Colon polyp    • Elevated cholesterol    • GERD (gastroesophageal reflux disease)    • Gout    • History of pulmonary embolism 2014    POST OP GALLBLADDER REMOVAL   • Hypertension    • Knee pain, bilateral    • Prediabetes    • Sleep apnea     NOT USING CPAP MACHINE CURRENTLY       PF/Surg/Soc Hx:     Past Surgical History:   Procedure Laterality Date   • CARPAL TUNNEL RELEASE  1978   • CHOLECYSTECTOMY  02/2014   • COLONOSCOPY     • ENDOSCOPY N/A 09/13/2019    Procedure: ESOPHAGOGASTRODUODENOSCOPY;  Surgeon: Felton Bone MD;  Location: [facility];  Service: Gastroenterology   • HAMMER TOE REPAIR  2000   • HERNIA REPAIR     • HIP BIPOLAR REPLACEMENT Right 10/2010   • KNEE ARTHROPLASTY Right    • KNEE ARTHROSCOPY  10/2014   • THYROID SURGERY     • TOE FUSION Right 11/12/2019    Procedure: Right first toe phalangeal osteotomy.  Right second, third, fourth and fifth metatarsal phalangeal joint release with the second and third metatarsal osteotomy and right second third and fourth proximal interphalangeal joint resection and fusion.;  Surgeon: Nick Christine MD;  Location:  [facility];  Service: Orthopedics        Social History     Occupational History   • Not on file   Tobacco Use   • Smoking status: Never   • Smokeless tobacco: Never   Vaping Use   • Vaping Use: Never used   Substance and Sexual Activity   • Alcohol use: No   • Drug use: No   • Sexual activity: Defer      Social History     Social History Narrative   • Not on file        Family History   Problem Relation Age of Onset   • Hypertension Other    • Colon cancer Other    • Colon cancer Brother    • Colon polyps Brother    • Malig Hyperthermia Neg Hx          Review of Systems:   A 14 point review of systems was performed, pertinent positives  "discussed above, all other systems are negative    Physical Exam: 67 y.o. male  Vital Signs :   Vitals:    12/08/22 1322   BP: 140/84   BP Location: Left arm   Patient Position: Sitting   Cuff Size: Large Adult   Temp: 96.6 °F (35.9 °C)   TempSrc: Temporal   Weight: (!) 145 kg (319 lb)   Height: 185.4 cm (72.99\")     General: Alert and Oriented x 3, No acute distress.  Psych: mood and affect appropriate; recent and remote memory intact  Eyes: conjunctivae clear; pupils equally round and reactive, sclerae anicteric  CV: no peripheral edema  Resp: normal respiratory effort  Skin: no rashes or wounds; normal turgor  Musculosketetal; pain and crepitance with knee range of motion  Vascular: palpable distal pulses    Xrays:  -3 views (AP, lateral, and sunrise) were reviewed demonstrating end-stage OA with bone on bone articulation.  -A full length AP xray was ordered and reviewed today for purposes of operative alignment demonstrating end stage arthritic findings. There are no previous full length films for review    Assessment:  End-stage Left knee osteoarthritis. Conservative measures have failed.      Plan:  The plan is to proceed with Left Total Knee Replacement. The patient voiced understanding of the risks, benefits, and alternative forms of treatment that were discussed with Dr Serrato at the time of scheduling.  23-hour [facility], Xarelto postoperatively for 6 weeks since he has a history of pulmonary embolus    Joanne Ortiz, APRN  12/8/2022        "

## 2022-12-08 NOTE — H&P (VIEW-ONLY)
Patient: John Paul Cisneros    Date of Admission: 12/12/2022    YOB: 1955    Medical Record Number: [id number]    Admitting Physician: Dr. Wilfred Serrato    Reason for Admission: End Stage Left Knee OA    History of Present Illness: 67 y.o. male presents with severe end stage knee osteoarthritis which has not been responsive to the full compliment of conservative measures. Despite conservative attempts, there is still severe, constant activity-limiting pain. Given the severity of the pain, the patient has elected to proceed with knee replacement.    Allergies:   Allergies   Allergen Reactions   • Oxycodone Other (See Comments)     ABDOMINAL Cramps   • Propoxyphene Other (See Comments)     Floating in the clouds   • Adhesive Tape Hives, Itching and Rash     EKG PATCHES   • Indapamide Cough         Current Medications:  Home Medications:    Current Outpatient Medications on File Prior to Visit   Medication Sig   • atorvastatin (LIPITOR) 80 MG tablet Take 40 mg by mouth Daily.   • Diclofenac Sodium 3 % gel gel Apply  topically to the appropriate area as directed 2 (Two) Times a Day.   • meloxicam (MOBIC) 15 MG tablet Take 15 mg by mouth Daily.   • omeprazole (priLOSEC) 40 MG capsule Take 40 mg by mouth Daily.   • sucralfate (CARAFATE) 1 g tablet Take 1 tablet by mouth 4 (Four) Times a Day. (Patient taking differently: Take 1 g by mouth 4 (Four) Times a Day As Needed.)   • Testosterone 30 MG/ACT solution Apply  topically to the appropriate area as directed. ONE PUMP DAILY   • [DISCONTINUED] amLODIPine (NORVASC) 10 MG tablet    • [DISCONTINUED] AMLODIPINE BESYLATE PO Take 10 mg by mouth Daily.   • [DISCONTINUED] aspirin  MG tablet Take 1 tablet by mouth Every 12 (Twelve) Hours.   • [DISCONTINUED] febuxostat (ULORIC) 40 MG tablet Take 40 mg by mouth Daily.   • [DISCONTINUED] irbesartan (AVAPRO) 150 MG tablet    • [DISCONTINUED] irbesartan (AVAPRO) 300 MG tablet Take 300 mg by mouth Daily.     Current  Facility-Administered Medications on File Prior to Visit   Medication   • lactated ringers infusion     PRN Meds:.    PMH:     Past Medical History:   Diagnosis Date   • Arthritis    • Colon polyp    • Elevated cholesterol    • GERD (gastroesophageal reflux disease)    • Gout    • History of pulmonary embolism 2014    POST OP GALLBLADDER REMOVAL   • Hypertension    • Knee pain, bilateral    • Prediabetes    • Sleep apnea     NOT USING CPAP MACHINE CURRENTLY       PF/Surg/Soc Hx:     Past Surgical History:   Procedure Laterality Date   • CARPAL TUNNEL RELEASE  1978   • CHOLECYSTECTOMY  02/2014   • COLONOSCOPY     • ENDOSCOPY N/A 09/13/2019    Procedure: ESOPHAGOGASTRODUODENOSCOPY;  Surgeon: Felton Bone MD;  Location: [facility] OR;  Service: Gastroenterology   • HAMMER TOE REPAIR  2000   • HERNIA REPAIR     • HIP BIPOLAR REPLACEMENT Right 10/2010   • KNEE ARTHROPLASTY Right    • KNEE ARTHROSCOPY  10/2014   • THYROID SURGERY     • TOE FUSION Right 11/12/2019    Procedure: Right first toe phalangeal osteotomy.  Right second, third, fourth and fifth metatarsal phalangeal joint release with the second and third metatarsal osteotomy and right second third and fourth proximal interphalangeal joint resection and fusion.;  Surgeon: Nick Christine MD;  Location:  [facility];  Service: Orthopedics        Social History     Occupational History   • Not on file   Tobacco Use   • Smoking status: Never   • Smokeless tobacco: Never   Vaping Use   • Vaping Use: Never used   Substance and Sexual Activity   • Alcohol use: No   • Drug use: No   • Sexual activity: Defer      Social History     Social History Narrative   • Not on file        Family History   Problem Relation Age of Onset   • Hypertension Other    • Colon cancer Other    • Colon cancer Brother    • Colon polyps Brother    • Malig Hyperthermia Neg Hx          Review of Systems:   A 14 point review of systems was performed, pertinent positives  "discussed above, all other systems are negative    Physical Exam: 67 y.o. male  Vital Signs :   Vitals:    12/08/22 1322   BP: 140/84   BP Location: Left arm   Patient Position: Sitting   Cuff Size: Large Adult   Temp: 96.6 °F (35.9 °C)   TempSrc: Temporal   Weight: (!) 145 kg (319 lb)   Height: 185.4 cm (72.99\")     General: Alert and Oriented x 3, No acute distress.  Psych: mood and affect appropriate; recent and remote memory intact  Eyes: conjunctivae clear; pupils equally round and reactive, sclerae anicteric  CV: no peripheral edema  Resp: normal respiratory effort  Skin: no rashes or wounds; normal turgor  Musculosketetal; pain and crepitance with knee range of motion  Vascular: palpable distal pulses    Xrays:  -3 views (AP, lateral, and sunrise) were reviewed demonstrating end-stage OA with bone on bone articulation.  -A full length AP xray was ordered and reviewed today for purposes of operative alignment demonstrating end stage arthritic findings. There are no previous full length films for review    Assessment:  End-stage Left knee osteoarthritis. Conservative measures have failed.      Plan:  The plan is to proceed with Left Total Knee Replacement. The patient voiced understanding of the risks, benefits, and alternative forms of treatment that were discussed with Dr Serrato at the time of scheduling.  23-hour [facility], Xarelto postoperatively for 6 weeks since he has a history of pulmonary embolus    Joanne Ortiz, APRN  12/8/2022        "

## 2022-12-12 ENCOUNTER — ANESTHESIA EVENT (OUTPATIENT)
Dept: PERIOP | Facility: HOSPITAL | Age: 67
End: 2022-12-12

## 2022-12-12 ENCOUNTER — HOSPITAL ENCOUNTER (OUTPATIENT)
Facility: HOSPITAL | Age: 67
Discharge: HOME HEALTH | End: 2022-12-13
Attending: ORTHOPAEDIC SURGERY | Admitting: ORTHOPAEDIC SURGERY

## 2022-12-12 ENCOUNTER — ANESTHESIA (OUTPATIENT)
Dept: PERIOP | Facility: HOSPITAL | Age: 67
End: 2022-12-12

## 2022-12-12 ENCOUNTER — APPOINTMENT (OUTPATIENT)
Dept: GENERAL RADIOLOGY | Facility: HOSPITAL | Age: 67
End: 2022-12-12

## 2022-12-12 DIAGNOSIS — M17.12 PRIMARY OSTEOARTHRITIS OF LEFT KNEE: ICD-10-CM

## 2022-12-12 DIAGNOSIS — Z98.890 S/P KNEE SURGERY: Primary | ICD-10-CM

## 2022-12-12 LAB
HCT VFR BLD AUTO: 40.1 % (ref 37.5–51)
HGB BLD-MCNC: 13.3 G/DL (ref 13–17.7)

## 2022-12-12 PROCEDURE — C9290 INJ, BUPIVACAINE LIPOSOME: HCPCS | Performed by: ORTHOPAEDIC SURGERY

## 2022-12-12 PROCEDURE — 25010000002 DEXAMETHASONE PER 1 MG: Performed by: ANESTHESIOLOGY

## 2022-12-12 PROCEDURE — 25010000002 HYDROMORPHONE 1 MG/ML SOLUTION: Performed by: NURSE ANESTHETIST, CERTIFIED REGISTERED

## 2022-12-12 PROCEDURE — 25010000002 VANCOMYCIN 10 G RECONSTITUTED SOLUTION: Performed by: NURSE PRACTITIONER

## 2022-12-12 PROCEDURE — 27447 TOTAL KNEE ARTHROPLASTY: CPT | Performed by: ORTHOPAEDIC SURGERY

## 2022-12-12 PROCEDURE — 25010000002 CEFAZOLIN PER 500 MG: Performed by: ORTHOPAEDIC SURGERY

## 2022-12-12 PROCEDURE — 25010000002 ONDANSETRON PER 1 MG: Performed by: NURSE ANESTHETIST, CERTIFIED REGISTERED

## 2022-12-12 PROCEDURE — 25010000002 FENTANYL CITRATE (PF) 100 MCG/2ML SOLUTION: Performed by: NURSE ANESTHETIST, CERTIFIED REGISTERED

## 2022-12-12 PROCEDURE — 25010000002 CEFAZOLIN IN DEXTROSE 2-4 GM/100ML-% SOLUTION: Performed by: NURSE PRACTITIONER

## 2022-12-12 PROCEDURE — 25010000002 PROPOFOL 10 MG/ML EMULSION: Performed by: NURSE ANESTHETIST, CERTIFIED REGISTERED

## 2022-12-12 PROCEDURE — 85014 HEMATOCRIT: CPT | Performed by: NURSE PRACTITIONER

## 2022-12-12 PROCEDURE — G0378 HOSPITAL OBSERVATION PER HR: HCPCS

## 2022-12-12 PROCEDURE — 27447 TOTAL KNEE ARTHROPLASTY: CPT | Performed by: NURSE PRACTITIONER

## 2022-12-12 PROCEDURE — 76942 ECHO GUIDE FOR BIOPSY: CPT | Performed by: ORTHOPAEDIC SURGERY

## 2022-12-12 PROCEDURE — 0 BUPIVACAINE LIPOSOME 1.3 % SUSPENSION 20 ML VIAL: Performed by: ORTHOPAEDIC SURGERY

## 2022-12-12 PROCEDURE — 25010000002 MIDAZOLAM PER 1 MG: Performed by: ANESTHESIOLOGY

## 2022-12-12 PROCEDURE — 25010000002 FENTANYL CITRATE (PF) 50 MCG/ML SOLUTION: Performed by: ANESTHESIOLOGY

## 2022-12-12 PROCEDURE — C1713 ANCHOR/SCREW BN/BN,TIS/BN: HCPCS | Performed by: ORTHOPAEDIC SURGERY

## 2022-12-12 PROCEDURE — 73560 X-RAY EXAM OF KNEE 1 OR 2: CPT

## 2022-12-12 PROCEDURE — C1776 JOINT DEVICE (IMPLANTABLE): HCPCS | Performed by: ORTHOPAEDIC SURGERY

## 2022-12-12 PROCEDURE — 85018 HEMOGLOBIN: CPT | Performed by: NURSE PRACTITIONER

## 2022-12-12 PROCEDURE — 25010000002 ROPIVACAINE PER 1 MG: Performed by: ANESTHESIOLOGY

## 2022-12-12 PROCEDURE — 25010000002 HYDROMORPHONE PER 4 MG: Performed by: NURSE ANESTHETIST, CERTIFIED REGISTERED

## 2022-12-12 DEVICE — KNOTLESS TISSUE CONTROL DEVICE, UNDYED UNIDIRECTIONAL (ANTIBACTERIAL) SYNTHETIC ABSORBABLE DEVICE
Type: IMPLANTABLE DEVICE | Site: KNEE | Status: FUNCTIONAL
Brand: STRATAFIX

## 2022-12-12 DEVICE — GENESIS II NON-POROUS TIBIAL                                    BASEPLATE SIZE 7 LEFT
Type: IMPLANTABLE DEVICE | Site: KNEE | Status: FUNCTIONAL
Brand: GENESIS II

## 2022-12-12 DEVICE — LEGION CRUCIATE RETAINING OXINIUM                                    FEMORAL SIZE 7 LEFT
Type: IMPLANTABLE DEVICE | Site: KNEE | Status: FUNCTIONAL
Brand: LEGION

## 2022-12-12 DEVICE — PALACOS® R IS A FAST-CURING, RADIOPAQUE, POLY(METHYL METHACRYLATE)-BASED BONE CEMENT.PALACOS ® R CONTAINS THE X-RAY CONTRAST MEDIUM ZIRCONIUM DIOXIDE. TO IMPROVE VISIBILITY IN THE SURGICAL FIELD PALACOS ® R HAS BEEN COLOURED WITH CHLOROPHYLL (E141). THE BONE CEMENT IS PREPARED DIRECTLY BEFORE USE BY MIXING A POLYMER POWDER COMPONENT WITH A LIQUID MONOMER COMPONENT. A DUCTILE DOUGH FORMS WHICH CURES WITHIN A FEW MINUTES.
Type: IMPLANTABLE DEVICE | Site: KNEE | Status: FUNCTIONAL
Brand: PALACOS®

## 2022-12-12 DEVICE — IMPLANTABLE DEVICE: Type: IMPLANTABLE DEVICE | Site: KNEE | Status: FUNCTIONAL

## 2022-12-12 DEVICE — LEGION CR HIGH FLEX XLPE SZ 7-8 9MM
Type: IMPLANTABLE DEVICE | Site: KNEE | Status: FUNCTIONAL
Brand: LEGION

## 2022-12-12 DEVICE — GENESIS II BICONVEX PATELLA 32MM
Type: IMPLANTABLE DEVICE | Site: KNEE | Status: FUNCTIONAL
Brand: GENESIS II

## 2022-12-12 DEVICE — VIOLET ANTIBACTERIAL POLYDIOXANONE, KNOTLESS TISSUE CONTROL DEVICE
Type: IMPLANTABLE DEVICE | Site: KNEE | Status: FUNCTIONAL
Brand: STRATAFIX

## 2022-12-12 RX ORDER — LIDOCAINE HYDROCHLORIDE 20 MG/ML
INJECTION, SOLUTION INFILTRATION; PERINEURAL AS NEEDED
Status: DISCONTINUED | OUTPATIENT
Start: 2022-12-12 | End: 2022-12-12 | Stop reason: SURG

## 2022-12-12 RX ORDER — DEXAMETHASONE SODIUM PHOSPHATE 4 MG/ML
INJECTION, SOLUTION INTRA-ARTICULAR; INTRALESIONAL; INTRAMUSCULAR; INTRAVENOUS; SOFT TISSUE AS NEEDED
Status: DISCONTINUED | OUTPATIENT
Start: 2022-12-12 | End: 2022-12-12 | Stop reason: SURG

## 2022-12-12 RX ORDER — PROPOFOL 10 MG/ML
VIAL (ML) INTRAVENOUS AS NEEDED
Status: DISCONTINUED | OUTPATIENT
Start: 2022-12-12 | End: 2022-12-12 | Stop reason: SURG

## 2022-12-12 RX ORDER — ACETAMINOPHEN 10 MG/ML
INJECTION, SOLUTION INTRAVENOUS AS NEEDED
Status: DISCONTINUED | OUTPATIENT
Start: 2022-12-12 | End: 2022-12-12 | Stop reason: SURG

## 2022-12-12 RX ORDER — MELOXICAM 7.5 MG/1
15 TABLET ORAL DAILY
Qty: 28 TABLET | Refills: 0 | Status: SHIPPED | OUTPATIENT
Start: 2022-12-12 | End: 2022-12-27

## 2022-12-12 RX ORDER — ONDANSETRON 4 MG/1
4 TABLET, FILM COATED ORAL EVERY 8 HOURS PRN
Qty: 10 TABLET | Refills: 0 | Status: SHIPPED | OUTPATIENT
Start: 2022-12-12

## 2022-12-12 RX ORDER — DIPHENHYDRAMINE HYDROCHLORIDE 50 MG/ML
12.5 INJECTION INTRAMUSCULAR; INTRAVENOUS
Status: DISCONTINUED | OUTPATIENT
Start: 2022-12-12 | End: 2022-12-12 | Stop reason: HOSPADM

## 2022-12-12 RX ORDER — NALOXONE HCL 0.4 MG/ML
0.2 VIAL (ML) INJECTION AS NEEDED
Status: DISCONTINUED | OUTPATIENT
Start: 2022-12-12 | End: 2022-12-12 | Stop reason: HOSPADM

## 2022-12-12 RX ORDER — SODIUM CHLORIDE 9 MG/ML
40 INJECTION, SOLUTION INTRAVENOUS AS NEEDED
Status: DISCONTINUED | OUTPATIENT
Start: 2022-12-12 | End: 2022-12-12 | Stop reason: HOSPADM

## 2022-12-12 RX ORDER — POLYETHYLENE GLYCOL 3350 17 G/17G
17 POWDER, FOR SOLUTION ORAL 2 TIMES DAILY
Qty: 238 G | Refills: 0 | Status: SHIPPED | OUTPATIENT
Start: 2022-12-12 | End: 2022-12-19

## 2022-12-12 RX ORDER — SODIUM CHLORIDE 0.9 % (FLUSH) 0.9 %
10 SYRINGE (ML) INJECTION AS NEEDED
Status: DISCONTINUED | OUTPATIENT
Start: 2022-12-12 | End: 2022-12-12 | Stop reason: HOSPADM

## 2022-12-12 RX ORDER — CEFAZOLIN SODIUM 2 G/100ML
2 INJECTION, SOLUTION INTRAVENOUS ONCE
Status: DISCONTINUED | OUTPATIENT
Start: 2022-12-12 | End: 2022-12-12

## 2022-12-12 RX ORDER — SODIUM CHLORIDE, SODIUM LACTATE, POTASSIUM CHLORIDE, CALCIUM CHLORIDE 600; 310; 30; 20 MG/100ML; MG/100ML; MG/100ML; MG/100ML
9 INJECTION, SOLUTION INTRAVENOUS CONTINUOUS PRN
Status: DISCONTINUED | OUTPATIENT
Start: 2022-12-12 | End: 2022-12-12 | Stop reason: HOSPADM

## 2022-12-12 RX ORDER — CEFAZOLIN SODIUM IN 0.9 % NACL 3 G/100 ML
3 INTRAVENOUS SOLUTION, PIGGYBACK (ML) INTRAVENOUS ONCE
Status: COMPLETED | OUTPATIENT
Start: 2022-12-12 | End: 2022-12-12

## 2022-12-12 RX ORDER — FEBUXOSTAT 40 MG/1
40 TABLET, FILM COATED ORAL DAILY
Status: DISCONTINUED | OUTPATIENT
Start: 2022-12-12 | End: 2022-12-13 | Stop reason: HOSPADM

## 2022-12-12 RX ORDER — OXYCODONE AND ACETAMINOPHEN 7.5; 325 MG/1; MG/1
1 TABLET ORAL EVERY 4 HOURS PRN
Status: DISCONTINUED | OUTPATIENT
Start: 2022-12-12 | End: 2022-12-12 | Stop reason: HOSPADM

## 2022-12-12 RX ORDER — HYDROCODONE BITARTRATE AND ACETAMINOPHEN 7.5; 325 MG/1; MG/1
1 TABLET ORAL EVERY 4 HOURS PRN
Status: DISCONTINUED | OUTPATIENT
Start: 2022-12-12 | End: 2022-12-13 | Stop reason: HOSPADM

## 2022-12-12 RX ORDER — ONDANSETRON 2 MG/ML
4 INJECTION INTRAMUSCULAR; INTRAVENOUS ONCE AS NEEDED
Status: DISCONTINUED | OUTPATIENT
Start: 2022-12-12 | End: 2022-12-12 | Stop reason: HOSPADM

## 2022-12-12 RX ORDER — HYDROCODONE BITARTRATE AND ACETAMINOPHEN 7.5; 325 MG/1; MG/1
1 TABLET ORAL ONCE AS NEEDED
Status: DISCONTINUED | OUTPATIENT
Start: 2022-12-12 | End: 2022-12-12 | Stop reason: HOSPADM

## 2022-12-12 RX ORDER — PREGABALIN 75 MG/1
150 CAPSULE ORAL ONCE
Status: COMPLETED | OUTPATIENT
Start: 2022-12-12 | End: 2022-12-12

## 2022-12-12 RX ORDER — ROCURONIUM BROMIDE 10 MG/ML
INJECTION, SOLUTION INTRAVENOUS AS NEEDED
Status: DISCONTINUED | OUTPATIENT
Start: 2022-12-12 | End: 2022-12-12 | Stop reason: SURG

## 2022-12-12 RX ORDER — FENTANYL CITRATE 50 UG/ML
INJECTION, SOLUTION INTRAMUSCULAR; INTRAVENOUS AS NEEDED
Status: DISCONTINUED | OUTPATIENT
Start: 2022-12-12 | End: 2022-12-12 | Stop reason: SURG

## 2022-12-12 RX ORDER — HYDROCODONE BITARTRATE AND ACETAMINOPHEN 7.5; 325 MG/1; MG/1
2 TABLET ORAL EVERY 4 HOURS PRN
Status: DISCONTINUED | OUTPATIENT
Start: 2022-12-12 | End: 2022-12-13 | Stop reason: HOSPADM

## 2022-12-12 RX ORDER — HYDROMORPHONE HYDROCHLORIDE 1 MG/ML
0.5 INJECTION, SOLUTION INTRAMUSCULAR; INTRAVENOUS; SUBCUTANEOUS
Status: DISCONTINUED | OUTPATIENT
Start: 2022-12-12 | End: 2022-12-12 | Stop reason: HOSPADM

## 2022-12-12 RX ORDER — HYDRALAZINE HYDROCHLORIDE 20 MG/ML
5 INJECTION INTRAMUSCULAR; INTRAVENOUS
Status: DISCONTINUED | OUTPATIENT
Start: 2022-12-12 | End: 2022-12-12 | Stop reason: HOSPADM

## 2022-12-12 RX ORDER — METFORMIN HYDROCHLORIDE 500 MG/1
500 TABLET, EXTENDED RELEASE ORAL EVERY EVENING
Status: DISCONTINUED | OUTPATIENT
Start: 2022-12-12 | End: 2022-12-13 | Stop reason: HOSPADM

## 2022-12-12 RX ORDER — ONDANSETRON 2 MG/ML
INJECTION INTRAMUSCULAR; INTRAVENOUS AS NEEDED
Status: DISCONTINUED | OUTPATIENT
Start: 2022-12-12 | End: 2022-12-12 | Stop reason: SURG

## 2022-12-12 RX ORDER — CARVEDILOL 12.5 MG/1
12.5 TABLET ORAL 2 TIMES DAILY WITH MEALS
Status: DISCONTINUED | OUTPATIENT
Start: 2022-12-12 | End: 2022-12-13 | Stop reason: HOSPADM

## 2022-12-12 RX ORDER — LABETALOL HYDROCHLORIDE 5 MG/ML
5 INJECTION, SOLUTION INTRAVENOUS
Status: DISCONTINUED | OUTPATIENT
Start: 2022-12-12 | End: 2022-12-12 | Stop reason: HOSPADM

## 2022-12-12 RX ORDER — HYDROCODONE BITARTRATE AND ACETAMINOPHEN 7.5; 325 MG/1; MG/1
1-2 TABLET ORAL EVERY 4 HOURS PRN
Qty: 42 TABLET | Refills: 0 | Status: SHIPPED | OUTPATIENT
Start: 2022-12-12 | End: 2022-12-16 | Stop reason: SDUPTHER

## 2022-12-12 RX ORDER — ROPIVACAINE HYDROCHLORIDE 5 MG/ML
INJECTION, SOLUTION EPIDURAL; INFILTRATION; PERINEURAL
Status: COMPLETED | OUTPATIENT
Start: 2022-12-12 | End: 2022-12-12

## 2022-12-12 RX ORDER — FENTANYL CITRATE 50 UG/ML
25 INJECTION, SOLUTION INTRAMUSCULAR; INTRAVENOUS
Status: DISCONTINUED | OUTPATIENT
Start: 2022-12-12 | End: 2022-12-12 | Stop reason: HOSPADM

## 2022-12-12 RX ORDER — DEXAMETHASONE SODIUM PHOSPHATE 4 MG/ML
INJECTION, SOLUTION INTRA-ARTICULAR; INTRALESIONAL; INTRAMUSCULAR; INTRAVENOUS; SOFT TISSUE
Status: COMPLETED | OUTPATIENT
Start: 2022-12-12 | End: 2022-12-12

## 2022-12-12 RX ORDER — CEFAZOLIN SODIUM 2 G/100ML
2 INJECTION, SOLUTION INTRAVENOUS EVERY 8 HOURS
Status: COMPLETED | OUTPATIENT
Start: 2022-12-12 | End: 2022-12-13

## 2022-12-12 RX ORDER — MAGNESIUM HYDROXIDE 1200 MG/15ML
LIQUID ORAL AS NEEDED
Status: DISCONTINUED | OUTPATIENT
Start: 2022-12-12 | End: 2022-12-12 | Stop reason: HOSPADM

## 2022-12-12 RX ORDER — MIDAZOLAM HYDROCHLORIDE 1 MG/ML
0.5 INJECTION INTRAMUSCULAR; INTRAVENOUS
Status: DISCONTINUED | OUTPATIENT
Start: 2022-12-12 | End: 2022-12-12 | Stop reason: HOSPADM

## 2022-12-12 RX ORDER — POVIDONE-IODINE 10 MG/ML
SOLUTION TOPICAL ONCE
Status: COMPLETED | OUTPATIENT
Start: 2022-12-12 | End: 2022-12-12

## 2022-12-12 RX ORDER — SODIUM CHLORIDE 0.9 % (FLUSH) 0.9 %
10 SYRINGE (ML) INJECTION EVERY 12 HOURS SCHEDULED
Status: DISCONTINUED | OUTPATIENT
Start: 2022-12-12 | End: 2022-12-12 | Stop reason: HOSPADM

## 2022-12-12 RX ORDER — FLUMAZENIL 0.1 MG/ML
0.2 INJECTION INTRAVENOUS AS NEEDED
Status: DISCONTINUED | OUTPATIENT
Start: 2022-12-12 | End: 2022-12-12 | Stop reason: HOSPADM

## 2022-12-12 RX ORDER — AMLODIPINE BESYLATE 10 MG/1
10 TABLET ORAL DAILY
Status: DISCONTINUED | OUTPATIENT
Start: 2022-12-13 | End: 2022-12-13 | Stop reason: HOSPADM

## 2022-12-12 RX ORDER — ACETAMINOPHEN 325 MG/1
650 TABLET ORAL EVERY 6 HOURS PRN
Status: DISCONTINUED | OUTPATIENT
Start: 2022-12-12 | End: 2022-12-13 | Stop reason: HOSPADM

## 2022-12-12 RX ORDER — MELOXICAM 15 MG/1
15 TABLET ORAL ONCE
Status: COMPLETED | OUTPATIENT
Start: 2022-12-12 | End: 2022-12-12

## 2022-12-12 RX ORDER — EPHEDRINE SULFATE 50 MG/ML
5 INJECTION, SOLUTION INTRAVENOUS ONCE AS NEEDED
Status: DISCONTINUED | OUTPATIENT
Start: 2022-12-12 | End: 2022-12-12 | Stop reason: HOSPADM

## 2022-12-12 RX ORDER — LOSARTAN POTASSIUM 100 MG/1
100 TABLET ORAL
Status: DISCONTINUED | OUTPATIENT
Start: 2022-12-12 | End: 2022-12-13 | Stop reason: HOSPADM

## 2022-12-12 RX ORDER — DIPHENHYDRAMINE HCL 25 MG
25 CAPSULE ORAL
Status: DISCONTINUED | OUTPATIENT
Start: 2022-12-12 | End: 2022-12-12 | Stop reason: HOSPADM

## 2022-12-12 RX ORDER — FENTANYL CITRATE 50 UG/ML
50 INJECTION, SOLUTION INTRAMUSCULAR; INTRAVENOUS
Status: DISCONTINUED | OUTPATIENT
Start: 2022-12-12 | End: 2022-12-12 | Stop reason: HOSPADM

## 2022-12-12 RX ORDER — ONDANSETRON 4 MG/1
4 TABLET, FILM COATED ORAL EVERY 6 HOURS PRN
Status: DISCONTINUED | OUTPATIENT
Start: 2022-12-12 | End: 2022-12-13 | Stop reason: HOSPADM

## 2022-12-12 RX ORDER — PROMETHAZINE HYDROCHLORIDE 25 MG/1
25 SUPPOSITORY RECTAL ONCE AS NEEDED
Status: DISCONTINUED | OUTPATIENT
Start: 2022-12-12 | End: 2022-12-12 | Stop reason: HOSPADM

## 2022-12-12 RX ORDER — PROMETHAZINE HYDROCHLORIDE 12.5 MG/1
12.5 TABLET ORAL EVERY 4 HOURS PRN
Status: DISCONTINUED | OUTPATIENT
Start: 2022-12-12 | End: 2022-12-13 | Stop reason: HOSPADM

## 2022-12-12 RX ORDER — PROMETHAZINE HYDROCHLORIDE 25 MG/1
25 TABLET ORAL ONCE AS NEEDED
Status: DISCONTINUED | OUTPATIENT
Start: 2022-12-12 | End: 2022-12-12 | Stop reason: HOSPADM

## 2022-12-12 RX ADMIN — MIDAZOLAM 1 MG: 1 INJECTION INTRAMUSCULAR; INTRAVENOUS at 11:10

## 2022-12-12 RX ADMIN — CARVEDILOL 12.5 MG: 12.5 TABLET, FILM COATED ORAL at 17:32

## 2022-12-12 RX ADMIN — CEFAZOLIN 3 G: 10 INJECTION, POWDER, FOR SOLUTION INTRAVENOUS at 12:03

## 2022-12-12 RX ADMIN — DEXAMETHASONE SODIUM PHOSPHATE 4 MG: 4 INJECTION, SOLUTION INTRAMUSCULAR; INTRAVENOUS at 11:14

## 2022-12-12 RX ADMIN — ONDANSETRON 4 MG: 2 INJECTION INTRAMUSCULAR; INTRAVENOUS at 13:39

## 2022-12-12 RX ADMIN — FENTANYL CITRATE 25 MCG: 0.05 INJECTION, SOLUTION INTRAMUSCULAR; INTRAVENOUS at 11:10

## 2022-12-12 RX ADMIN — ROPIVACAINE HYDROCHLORIDE 20 ML: 5 INJECTION, SOLUTION EPIDURAL; INFILTRATION; PERINEURAL at 11:14

## 2022-12-12 RX ADMIN — FEBUXOSTAT 40 MG: 40 TABLET, FILM COATED ORAL at 17:31

## 2022-12-12 RX ADMIN — PROPOFOL 200 MG: 10 INJECTION, EMULSION INTRAVENOUS at 12:14

## 2022-12-12 RX ADMIN — SUGAMMADEX 200 MG: 100 INJECTION, SOLUTION INTRAVENOUS at 13:39

## 2022-12-12 RX ADMIN — LOSARTAN POTASSIUM 100 MG: 100 TABLET, FILM COATED ORAL at 17:31

## 2022-12-12 RX ADMIN — SODIUM CHLORIDE, POTASSIUM CHLORIDE, SODIUM LACTATE AND CALCIUM CHLORIDE 500 ML: 600; 310; 30; 20 INJECTION, SOLUTION INTRAVENOUS at 10:32

## 2022-12-12 RX ADMIN — SODIUM CHLORIDE, POTASSIUM CHLORIDE, SODIUM LACTATE AND CALCIUM CHLORIDE: 600; 310; 30; 20 INJECTION, SOLUTION INTRAVENOUS at 12:08

## 2022-12-12 RX ADMIN — HYDROCODONE BITARTRATE AND ACETAMINOPHEN 1 TABLET: 7.5; 325 TABLET ORAL at 18:53

## 2022-12-12 RX ADMIN — FENTANYL CITRATE 25 MCG: 50 INJECTION INTRAMUSCULAR; INTRAVENOUS at 13:22

## 2022-12-12 RX ADMIN — METFORMIN HYDROCHLORIDE 500 MG: 500 TABLET, EXTENDED RELEASE ORAL at 17:31

## 2022-12-12 RX ADMIN — HYDROCODONE BITARTRATE AND ACETAMINOPHEN 1 TABLET: 7.5; 325 TABLET ORAL at 14:32

## 2022-12-12 RX ADMIN — ACETAMINOPHEN 1000 MG: 10 INJECTION, SOLUTION INTRAVENOUS at 12:27

## 2022-12-12 RX ADMIN — HYDROMORPHONE HYDROCHLORIDE 0.5 MG: 1 INJECTION, SOLUTION INTRAMUSCULAR; INTRAVENOUS; SUBCUTANEOUS at 14:35

## 2022-12-12 RX ADMIN — FENTANYL CITRATE 25 MCG: 50 INJECTION INTRAMUSCULAR; INTRAVENOUS at 12:54

## 2022-12-12 RX ADMIN — POVIDONE-IODINE 1 EACH: 10 SOLUTION TOPICAL at 10:31

## 2022-12-12 RX ADMIN — FENTANYL CITRATE 25 MCG: 50 INJECTION INTRAMUSCULAR; INTRAVENOUS at 13:28

## 2022-12-12 RX ADMIN — CEFAZOLIN SODIUM 2 G: 2 INJECTION, SOLUTION INTRAVENOUS at 20:51

## 2022-12-12 RX ADMIN — DEXAMETHASONE SODIUM PHOSPHATE 8 MG: 4 INJECTION, SOLUTION INTRA-ARTICULAR; INTRALESIONAL; INTRAMUSCULAR; INTRAVENOUS; SOFT TISSUE at 12:19

## 2022-12-12 RX ADMIN — ROCURONIUM BROMIDE 50 MG: 50 INJECTION INTRAVENOUS at 12:14

## 2022-12-12 RX ADMIN — SODIUM CHLORIDE, POTASSIUM CHLORIDE, SODIUM LACTATE AND CALCIUM CHLORIDE: 600; 310; 30; 20 INJECTION, SOLUTION INTRAVENOUS at 13:40

## 2022-12-12 RX ADMIN — VANCOMYCIN HYDROCHLORIDE 2250 MG: 10 INJECTION, POWDER, LYOPHILIZED, FOR SOLUTION INTRAVENOUS at 11:00

## 2022-12-12 RX ADMIN — FENTANYL CITRATE 25 MCG: 50 INJECTION INTRAMUSCULAR; INTRAVENOUS at 13:08

## 2022-12-12 RX ADMIN — MELOXICAM 15 MG: 15 TABLET ORAL at 10:31

## 2022-12-12 RX ADMIN — ROCURONIUM BROMIDE 20 MG: 50 INJECTION INTRAVENOUS at 13:08

## 2022-12-12 RX ADMIN — PREGABALIN 150 MG: 75 CAPSULE ORAL at 10:31

## 2022-12-12 RX ADMIN — HYDROMORPHONE HYDROCHLORIDE 0.5 MG: 1 INJECTION, SOLUTION INTRAMUSCULAR; INTRAVENOUS; SUBCUTANEOUS at 13:52

## 2022-12-12 RX ADMIN — PROPOFOL 150 MCG/KG/MIN: 10 INJECTION, EMULSION INTRAVENOUS at 12:14

## 2022-12-12 RX ADMIN — LIDOCAINE HYDROCHLORIDE 100 MG: 20 INJECTION, SOLUTION INFILTRATION; PERINEURAL at 12:14

## 2022-12-12 NOTE — ANESTHESIA PROCEDURE NOTES
Peripheral Block      Patient reassessed immediately prior to procedure    Patient location during procedure: pre-op  Reason for block: at surgeon's request and post-op pain management  Performed by  Anesthesiologist: Raman Etienne MD  Preanesthetic Checklist  Completed: patient identified, IV checked, site marked, risks and benefits discussed, surgical consent, monitors and equipment checked, pre-op evaluation and timeout performed  Prep:  Pt Position: sitting  Sterile barriers:cap, gloves, sterile barriers and mask  Prep: ChloraPrep  Patient monitoring: blood pressure monitoring, continuous pulse oximetry and EKG  Procedure    Sedation: yes    Guidance:ultrasound guided    ULTRASOUND INTERPRETATION.  Using ultrasound guidance a 21 G gauge needle was placed in close proximity to the femoral nerve, at which point, under ultrasound guidance anesthetic was injected in the area of the nerve and spread of the anesthesia was seen on ultrasound in close proximity thereto.  There were no abnormalities seen on ultrasound; a digital image was taken; and the patient tolerated the procedure with no complications. Images:still images obtained, printed/placed on chart    Laterality:left  Block Type:adductor canal block  Injection Technique:single-shot  Needle Type:echogenic  Needle Gauge:21 G  Resistance on Injection: none    Medications Used: dexamethasone (DECADRON) injection - Injection   4 mg - 12/12/2022 11:14:00 AM  ropivacaine (NAROPIN) 0.5 % injection - Injection   20 mL - 12/12/2022 11:14:00 AM      Medications  Comment:Heme positive on initial aspiration. Needle removed, tubing flushed until clear and needle reinserted. Block completed as above with aspiration negative on each subsequent attempt and LA pool visual on US.     Post Assessment  Injection Assessment: no paresthesia on injection and incremental injection  Patient Tolerance:comfortable throughout block  Complications:no

## 2022-12-12 NOTE — CASE MANAGEMENT/SOCIAL WORK
Discharge Planning Assessment  [facility]     Patient Name: John Paul Cisneros  MRN: [id number]  Today's Date: 12/12/2022    Admit Date: 12/12/2022    Plan: plans to return home with wife and [facility]  ( referral pending)   Discharge Needs Assessment     Row Name 12/12/22 1755       Living Environment    People in Home spouse    Name(s) of People in Home wife, Sugey    Current Living Arrangements home    Primary Care Provided by self    Provides Primary Care For no one    Family Caregiver if Needed spouse    Family Caregiver Names wife, Sugey    Quality of Family Relationships helpful;involved;supportive    Able to Return to Prior Arrangements yes       Resource/Environmental Concerns    Resource/Environmental Concerns none       Transition Planning    Patient/Family Anticipates Transition to home with family;home with help/services    Patient/Family Anticipated Services at Transition home health care    Transportation Anticipated family or friend will provide       Discharge Needs Assessment    Equipment Currently Used at Home none               Discharge Plan     Row Name 12/12/22 8795       Plan    Plan plans to return home with wife and [facility] ( referral pending)    Patient/Family in Agreement with Plan yes    Plan Comments Spoke with patient at bedside.  Introduced self and explained role.  Facesheet verified.  Patient lives in a single story house.  There are 3 steps to enter the front and 4 to enter through the back.  There is also a ramp.  Patient has a cane, rollator, walker and wheelchair, but does not normally use them.  Patient plans to return home with HH at [place]. Referral sent to [facility].  CCP will follow. Joann GARCIA RN              Continued Care and Services - Admitted Since 12/12/2022     Home Medical Care     Service Provider Request Status Selected Services Address Phone Fax Patient Preferred     [facility] Pending - Request Sent N/A [id number] [address]  [phone] --              Expected Discharge Date and Time     Expected Discharge Date Expected Discharge Time    Dec 13, 2022          Demographic Summary     Row Name 12/12/22 1753       General Information    Admission Type observation    Arrived From operating room    Referral Source admission list    Reason for Consult discharge planning    Preferred Language English               Functional Status     Row Name 12/12/22 1754       Functional Status    Usual Activity Tolerance good    Current Activity Tolerance fair       Functional Status, IADL    Medications independent    Meal Preparation independent    Housekeeping independent    Laundry independent    Shopping independent       Mental Status    General Appearance WDL WDL       Mental Status Summary    Recent Changes in Mental Status/Cognitive Functioning no changes               Psychosocial    No documentation.                Abuse/Neglect    No documentation.                Legal    No documentation.                Substance Abuse    No documentation.                Patient Forms    No documentation.                   Joann Hines RN

## 2022-12-12 NOTE — ANESTHESIA PREPROCEDURE EVALUATION
Anesthesia Evaluation     Patient summary reviewed and Nursing notes reviewed   NPO Solid Status: > 6 hours  NPO Liquid Status: > 2 hours           Airway   Mallampati: II  TM distance: >3 FB  Neck ROM: full  Dental - normal exam     Pulmonary     breath sounds clear to auscultation  (+) sleep apnea,   (-) COPD, asthma, not a smoker  Cardiovascular   Exercise tolerance: good (4-7 METS)    ECG reviewed  Rhythm: regular  Rate: normal    (+) hypertension, hyperlipidemia,   (-) CAD, dysrhythmias, angina, cardiac stents      Neuro/Psych  (-) seizures, CVA  GI/Hepatic/Renal/Endo    (+) morbid obesity, GERD,    (-) liver disease, no renal disease, diabetes, no thyroid disorder    Musculoskeletal     Abdominal    Substance History      OB/GYN          Other   arthritis,                      Anesthesia Plan    ASA 3     general with block       Anesthetic plan, risks, benefits, and alternatives have been provided, discussed and informed consent has been obtained with: patient.        CODE STATUS:

## 2022-12-12 NOTE — DISCHARGE PLACEMENT REQUEST
"Katya Cisneros (67 y.o. Male)     Date of Birth   1955    Social Security Number       Address   [address] [address]    Home Phone   [phone]    MRN   [id number]       [facility]    Marital Status                               Admission Date   12/12/22    Admission Type   Elective    Admitting Provider   Wilfred Serrato MD    Attending Provider   Wilfred Serrato MD    Department, Room/Bed   [address], P790/1       Discharge Date       Discharge Disposition   Home-Health Care [facility]    Discharge Destination                               Attending Provider: Wilfred Serrato MD    Allergies: Oxycodone, Propoxyphene, Adhesive Tape, Indapamide    Isolation: None   Infection: None   Code Status: Not on file    Ht: 185.4 cm (72.99\")   Wt: 145 kg (319 lb 10.7 oz)    Admission Cmt: None   Principal Problem: Primary osteoarthritis of left knee [M17.12]                 Active Insurance as of 12/12/2022     Primary Coverage     Payor Plan Insurance Group Employer/Plan Group    HUMANA MEDICARE REPLACEMENT HUMANA MEDICARE REPLACEMENT [id number]     Payor Plan Address Payor Plan Phone Number Payor Plan Fax Number Effective Dates    [address] [address] [phone]  11/1/2021 - None Entered    [facility] [address]       Subscriber Name Subscriber Birth Date Member ID       KATYA CISNEROS 1955 [id number]                 Emergency Contacts      (Rel.) Home Phone Work Phone Mobile Phone    AmeliaKaitlin J (Spouse) [phone] -- --              "

## 2022-12-12 NOTE — ANESTHESIA POSTPROCEDURE EVALUATION
Patient: John Paul Cisneros    Procedure Summary     Date: 12/12/22 Room / Location:  [facility] [address] [place] [address] [facility]    Anesthesia Start: 1207 Anesthesia Stop: 1406    Procedure: TOTAL KNEE ARTHROPLASTY (Left: Knee) Diagnosis:       Primary osteoarthritis of left knee      (Primary osteoarthritis of left knee [M17.12])    Surgeons: Wilfred Serrato MD Provider: Raman Etienne MD    Anesthesia Type: general with block ASA Status: 3          Anesthesia Type: general with block    Vitals  Vitals Value Taken Time   /71 12/12/22 1445   Temp 36.7 °C (98.1 °F) 12/12/22 1403   Pulse 57 12/12/22 1458   Resp 16 12/12/22 1430   SpO2 94 % 12/12/22 1458   Vitals shown include unvalidated device data.        Post Anesthesia Care and Evaluation    Patient location during evaluation: PACU  Patient participation: complete - patient participated  Level of consciousness: awake  Pain management: satisfactory to patient    Airway patency: patent  Anesthetic complications: No anesthetic complications  PONV Status: controlled  Cardiovascular status: acceptable  Respiratory status: acceptable  Hydration status: acceptable

## 2022-12-12 NOTE — ANESTHESIA PROCEDURE NOTES
Airway  Urgency: elective    Date/Time: 12/12/2022 12:16 PM  Airway not difficult    General Information and Staff    Patient location during procedure: OR  Anesthesiologist: Raman Etienne MD  CRNA/CAA: Vashti Goodrich CRNA    Indications and Patient Condition  Indications for airway management: airway protection    Preoxygenated: yes  Mask difficulty assessment: 2 - vent by mask + OA or adjuvant +/- NMBA    Final Airway Details  Final airway type: endotracheal airway      Successful airway: ETT  Cuffed: yes   Successful intubation technique: direct laryngoscopy  Facilitating devices/methods: intubating stylet  Endotracheal tube insertion site: oral  Blade: Anna  Blade size: 4  ETT size (mm): 7.5  Cormack-Lehane Classification: grade I - full view of glottis  Placement verified by: chest auscultation and capnometry   Measured from: lips  ETT/EBT  to lips (cm): 22  Number of attempts at approach: 1  Assessment: lips, teeth, and gum same as pre-op and atraumatic intubation

## 2022-12-12 NOTE — OP NOTE
Name: John Paul Cisneros    YOB: 1955    DATE OF SURGERY: 12/12/2022    PREOPERATIVE DIAGNOSIS: Left knee end-stage osteoarthritis    POSTOPERATIVE DIAGNOSIS: Left knee end-stage osteoarthritis    PROCEDURE PERFORMED: Left total knee replacement     SURGEON: Wilfred Serrato M.D.    ASSISTANT: JOSE OSBORNE    A surgical assistant was integral in ensuring a successful outcome with this procedure.  The assistant was utilized to assist in positioning the patient, draping the patient, was used throughout the case to provide with retraction of tissues, suctioning of blood and body fluids for visualization, positioning of the extremity to allow for proper exposure so that I could perform the procedure.  Without the use of a surgical assistant during this procedure I feel that the outcome may have been compromised or would have been suboptimal or at risk for complications.    IMPLANTS: [employer]:     Implant Name Type Inv. Item Serial No.  Lot No. LRB No. Used Action   CMT BONE PALACOS R HI/VISC 1X40 - [id number] Implant CMT BONE PALACOS R HI/VISC 1X40  [facility] [id number] Left 2 Implanted   DEV CONTRL TISS STRATAFIX SYMM PDS PLUS KRYSTAL CT-1 60CM - [id number] Implant DEV CONTRL TISS STRATAFIX SYMM PDS PLUS KRYSTAL CT-1 60CM  [employer]  DIV [employer] J [employer] J [id number] Left 1 Implanted   DEV CONTRL TISS STRATAFIXSPIRALMNCRYL PLSPS2 REV3/0 45CM - [id number] Implant DEV CONTRL TISS STRATAFIXSPIRALMNCRYL PLSPS2 REV3/0 45CM  [employer]  DIV OF J AND J [id number] Left 1 Implanted   PAT GEN2 BICONVEX [id number] - [id number] Implant PAT GEN2 BICONVEX [id number]  [employer] [id number] Left 1 Implanted   COMP FEM LEGION OXINIUM CR SZ7 LT - [id number] Implant COMP FEM LEGION OXINIUM CR SZ7 LT  [employer] [id number] Left 1 Implanted   BASE TIB/KN GEN2 NONPOR TI SZ7 LT - [id number] Implant BASE TIB/KN GEN2 NONPOR TI SZ7 LT  [employer] [id number] Left 1 Implanted   INSRT ART/KN LEGION CR HF XLPE SZ7TO8 9MM -  [id number] Implant INSRT ART/KN LEGION CR HF XLPE SZ7TO8 9MM  [employer] [id number] Left 1 Implanted       Estimated Blood Loss: 200cc  Specimens : none  Complications: none    DESCRIPTION OF PROCEDURE: The patient was taken to the operating room and placed in the supine position. A sequential compression device was carefully placed on the non-operative leg. Preoperative antibiotics were administered. Surgical time out was performed. After adequate induction of anesthesia, the leg was prepped and draped in the usual sterile fashion, exsanguinated with an Esmarch bandage and the tourniquet inflated to 250 mmHg. A midline incision was performed followed by a medial parapatellar arthrotomy. The patella was subluxed laterally.  A portion of the fat pad, ACL, and anterior horns of the meniscus were excised.  A drill hole was then placed in the center of the femoral canal in line with the canal.  It was irrigated and suctioned.  The intramedullary mg was then placed and a 5 degree distal valgus cut was performed after the block pinned in place appropriately.  Cut surface was then removed.  The sizing and rotation guide was then placed and seated appropriately.  It was sized and then the drill holes for the 4-in-1 cutting guide were placed in 3 degrees of external rotation based off of the posterior condyles.  The 4-in-1 cutting block was then placed and the femoral cuts were performed.  The excess bone was removed and the cut surfaces looked good.  At this point we placed the retractors around the proximal tibia and a slight release of the PCL fibers off of the posterior proximal tibia was performed.  We used the extramedullary tibial alignment guide and it was aligned appropriately and then the depth was set and the block pinned in place.  The tibial cut was then performed and the alignment guide was removed.  The tibial cut was removed and the cut surface looked good.  The posterior horns of the menisci were then  removed as well as the posterior osteophytes.  Flexion extension blocks were then used to check the balance of the knee. The tibial cut surface was then sized with the sizing templates and the tibial and femoral trial were then placed. The knee was placed in full extension and then the tibial tray rotation was then matched to the femoral rotation and marked.    Attention was then placed to the patella. The patella was noted to track centrally through range of motion. The patella was then sized with the trials. The thickness of the patella was then measured. The patella was resurfaced and the surrounding osteophytes were removed. The preoperative thickness was reproduced. The patella tracked centrally through range of motion.  We then checked the balance with the trial implants in place and there was excellent medial lateral and flexion-extension balance.  The tibial and femoral arrays were then removed.  The checkpoint markers were then removed.   At this point all trial components were removed, the knee was copiously irrigated with pulsed lavage, and the knee was injected with anesthetic cocktail solution. The cut surfaces were then dried with clean lap sponges, and the components were cemented tibia, followed by femur, then patella. The knee was held in full extension and all excess cement was removed. The knee was held still until the cement had completely hardened. We then placed the trial polyethylene spacer which resulted in full extension and excellent flexion-extension balance. We placed the final polyethylene spacer.   The knee was then copiously irrigated. The tourniquet was then released. There was excellent hemostasis. We placed a one-eighth inch Hemovac drain. We closed the knee in multiple layers in standard fashion. Sterile dressing were applied. At the end of the case, the sponge and needle counts were reported as being correct. There were no known complications. The patient was then transported to  the recovery room.      Wilfred Serrato M.D.

## 2022-12-13 ENCOUNTER — HOME HEALTH ADMISSION (OUTPATIENT)
Dept: HOME HEALTH SERVICES | Facility: HOME HEALTHCARE | Age: 67
End: 2022-12-13

## 2022-12-13 VITALS
BODY MASS INDEX: 41.75 KG/M2 | SYSTOLIC BLOOD PRESSURE: 125 MMHG | HEART RATE: 71 BPM | RESPIRATION RATE: 16 BRPM | WEIGHT: 315 LBS | OXYGEN SATURATION: 95 % | TEMPERATURE: 98.2 F | DIASTOLIC BLOOD PRESSURE: 58 MMHG | HEIGHT: 73 IN

## 2022-12-13 LAB
HCT VFR BLD AUTO: 36.7 % (ref 37.5–51)
HGB BLD-MCNC: 12.6 G/DL (ref 13–17.7)

## 2022-12-13 PROCEDURE — 85014 HEMATOCRIT: CPT | Performed by: NURSE PRACTITIONER

## 2022-12-13 PROCEDURE — 97530 THERAPEUTIC ACTIVITIES: CPT

## 2022-12-13 PROCEDURE — 85018 HEMOGLOBIN: CPT | Performed by: NURSE PRACTITIONER

## 2022-12-13 PROCEDURE — 97161 PT EVAL LOW COMPLEX 20 MIN: CPT

## 2022-12-13 PROCEDURE — 99024 POSTOP FOLLOW-UP VISIT: CPT | Performed by: NURSE PRACTITIONER

## 2022-12-13 PROCEDURE — 25010000002 CEFAZOLIN IN DEXTROSE 2-4 GM/100ML-% SOLUTION: Performed by: NURSE PRACTITIONER

## 2022-12-13 PROCEDURE — G0378 HOSPITAL OBSERVATION PER HR: HCPCS

## 2022-12-13 RX ADMIN — CEFAZOLIN SODIUM 2 G: 2 INJECTION, SOLUTION INTRAVENOUS at 05:26

## 2022-12-13 RX ADMIN — RIVAROXABAN 10 MG: 10 TABLET, FILM COATED ORAL at 09:02

## 2022-12-13 RX ADMIN — CARVEDILOL 12.5 MG: 12.5 TABLET, FILM COATED ORAL at 09:02

## 2022-12-13 RX ADMIN — FEBUXOSTAT 40 MG: 40 TABLET, FILM COATED ORAL at 09:02

## 2022-12-13 RX ADMIN — HYDROCODONE BITARTRATE AND ACETAMINOPHEN 1 TABLET: 7.5; 325 TABLET ORAL at 06:11

## 2022-12-13 NOTE — PLAN OF CARE
Goal Outcome Evaluation:    Patient alert and oriented male POD #1 of a left total knee arthroplasty. Patient VSS on 2 liters of oxygen. He reports history of sleep apnea. Pain managed with PRN medication. Accordion drain removed this morning with >400 output. Dressed with 4x4 and tegaderm. HERI dressing intact. Patient voiding via urinal. [facility].

## 2022-12-13 NOTE — PLAN OF CARE
Goal Outcome Evaluation:   VSS- Pt alert and oriented. POD 1 of a left total knee. Dressing c/d/I. Neurovascular checks wdl. Pt eating, drinking and voiding well. Pt discharging home. Educational ipad video viewed.

## 2022-12-13 NOTE — CASE MANAGEMENT/SOCIAL WORK
Case Management Discharge Note      Final Note: Patient dc'd home with [employer] . Joann Hines RN         Selected Continued Care - Discharged on 12/13/2022 Admission date: 12/12/2022 - Discharge disposition: Home-Health Care Svc    Destination    No services have been selected for the patient.              Durable Medical Equipment    No services have been selected for the patient.              Dialysis/Infusion    No services have been selected for the patient.              Home Medical Care Coordination complete.    Service Provider Selected Services Address Phone Fax Patient Preferred    [facility] [address], [address], [place] [address] [phone] --          Therapy    No services have been selected for the patient.              Community Resources    No services have been selected for the patient.              Community & DME    No services have been selected for the patient.                       Final Discharge Disposition Code: 06 - home with home health care

## 2022-12-13 NOTE — CASE MANAGEMENT/SOCIAL WORK
Continued Stay Note  [facility]     Patient Name: John Paul Cisneros  MRN: [id number]  Today's Date: 12/13/2022    Admit Date: 12/12/2022    Plan:  pending   Discharge Plan     Row Name 12/13/22 1002       Plan    Plan  pending    Plan Comments [facility] can't accept, referral to [facility] . CCP to follow.               Discharge Codes    No documentation.               Expected Discharge Date and Time     Expected Discharge Date Expected Discharge Time    Dec 13, 2022             PAULINE Rudd

## 2022-12-13 NOTE — DISCHARGE SUMMARY
Patient Name: John Paul Cisneros  Patient YOB: 1955    Date of Admission:  12/12/2022  Date of Discharge:  12/13/2022  Discharge Diagnosis: CT TOTAL KNEE ARTHROPLASTY [55954] (TOTAL KNEE ARTHROPLASTY)  Presenting Problem/History of Present Illness: Primary osteoarthritis of left knee [M17.12]  S/P knee surgery [Z98.890]  Admitting Physician: Dr Wilfred Serrato  Consults:   Consults     No orders found for last 30 day(s).          DETAILS OF HOSPITAL STAY:  Patient is a 67 y.o. male was admitted to the floor following the above procedure and underwent an uncomplicated hospital stay.  Patient did well with physical therapy and was ambulating well without problems.  On the day of discharge the wound was clean, dry and intact and calf was soft and nontender and Homans sign was negative.  Patient was tolerating   Diet Instructions     Advance Diet as Tolerated      May advance diet as tolerated while in hospital.    Diet:      Diet Texture / Consistency: Regular       without problems.  Patient will be discharged home.    Condition on Discharge:  Stable    Vital Signs  Temp:  [97.7 °F (36.5 °C)-98.3 °F (36.8 °C)] 98.2 °F (36.8 °C)  Heart Rate:  [50-85] 59  Resp:  [16-18] 16  BP: (117-162)/(63-83) 139/64    LABS:      Admission on 12/12/2022   Component Date Value Ref Range Status   • Hemoglobin 12/12/2022 13.3  13.0 - 17.7 g/dL Final   • Hematocrit 12/12/2022 40.1  37.5 - 51.0 % Final   • Hemoglobin 12/13/2022 12.6 (L)  13.0 - 17.7 g/dL Final   • Hematocrit 12/13/2022 36.7 (L)  37.5 - 51.0 % Final       No results found.    Discharge Medications     Discharge Medications      New Medications      Instructions Start Date   HYDROcodone-acetaminophen 7.5-325 MG per tablet  Commonly known as: NORCO   Take 1-2 tablets by mouth Every 4 (Four) to 6 (Six) Hours As Needed for Moderate Pain.      ondansetron 4 MG tablet  Commonly known as: Zofran   4 mg, Oral, Every 8 Hours PRN      polyethylene glycol 17 GM/SCOOP  powder  Commonly known as: MIRALAX   17 g, Oral, 2 Times Daily, Dispense 7 day supply      rivaroxaban 10 MG tablet  Commonly known as: Xarelto   10 mg, Oral, Daily         Changes to Medications      Instructions Start Date   meloxicam 7.5 MG tablet  Commonly known as: MOBIC  What changed: medication strength   15 mg, Oral, Daily      sucralfate 1 g tablet  Commonly known as: Carafate  What changed:   · when to take this  · reasons to take this   1 g, Oral, 4 Times Daily         Continue These Medications      Instructions Start Date   acetaminophen 500 MG tablet  Commonly known as: TYLENOL   500 mg, Oral, Every 6 Hours PRN      acyclovir 200 MG capsule  Commonly known as: ZOVIRAX   200 mg, Oral, As Needed      amLODIPine 10 MG tablet  Commonly known as: NORVASC   1 tablet, Oral, Daily      atorvastatin 80 MG tablet  Commonly known as: LIPITOR   40 mg, Oral, Daily      carvedilol 12.5 MG tablet  Commonly known as: COREG   12.5 mg, Oral, 2 Times Daily With Meals      Diclofenac Sodium 3 % gel gel   Topical, 2 Times Daily      febuxostat 40 MG tablet  Commonly known as: ULORIC   1 tablet, Oral, Daily      irbesartan 300 MG tablet  Commonly known as: AVAPRO   1 tablet, Oral, Daily      metFORMIN  MG 24 hr tablet  Commonly known as: GLUCOPHAGE-XR   500 mg, Every Evening      omeprazole 40 MG capsule  Commonly known as: priLOSEC   40 mg, Oral, Daily      Testosterone 30 MG/ACT solution   Topical, ONE PUMP DAILY         Stop These Medications    ASPIRIN 81 PO     Chlorhexidine Gluconate Cloth 2 % pads     METAMUCIL FIBER PO            Discharge Instructions: Patient is to continue with physical therapy exercises daily and continue working with the physical therapist as ordered. Patient may weight bear as tolerated. Apply ice regularly. Patient may ice for long periods of time as long as ice is not directly on the skin. Patient instructed on frequent calf pumping exercises.  Patient also instructed on incentive  spirometer during hospitalization and encouraged to continue to use at home regularly.    Dressing: The dressing is designed to be left in place until you return to the office in 2 weeks.  The suction unit should stop functioning at 7 days and the green light will switch to yellow.  At that point the suction unit and tubing can be disconnected at the port closest to the dressing.  The suction unit and tubing may be discarded.  You may shower immediately upon return home, you will need to turn the pump off by depressing the orange button once and then you may disconnect the pump and tubing at the connection port.  After showering, shake off the excess water and reattach the tubing.  Restart the pump by depressing the orange button one time and you will notice the green light flashing again.  If the dressing becomes dislodged or saturated it should be changed. Please refer to the HERI information sheet if you have any questions about the dressing.  If you have a home health nurse or therapist they can be contacted to assist with dressing change or repair. You may also call the HERI dressing hotline for questions related to the dressing ([phone]). If there are still other problems or questions related to the dressing despite these measures then you can contact Asiya at our office [phone].  If for some reason the HERI dressing is removed, after 7 days the wound can be gently cleaned with antibacterial soap then allowed to dry and covered with a dry sterile dressing. The wound should be covered at all times except while showering.  Patient may change dressings daily and prn using sterile 4x4 and paper tape, and should call if any unusual drainage, redness or swelling.*  Follow up appointment in 2 weeks - patient to call the office at [phone] to schedule.  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   • Class 3 severe obesity due to excess calories with serious comorbidity and body mass index (BMI) of 40.0 to 44.9 in adult (HCC)   • Circadian rhythm sleep disorder, advanced sleep phase type   • Circadian rhythm sleep disorder, shift work type   • Other chest pain   • Hallux valgus of right foot   • Metatarsalgia of right foot   • Hammer toe of right foot   • Cellulitis of toe of right foot   • Closed nondisplaced fracture of proximal phalanx of right great toe   • Arthritis of foot   • Primary osteoarthritis of left knee   • S/P knee surgery       Follow-up Appointments  Future Appointments   Date Time Provider Department Center   1/3/2023  1:40 PM Wilfred Serrato MD [facility] [address] [facility]   10/12/2023  8:30 AM John Abernathy MD [facility] None          Joanne Ortiz, ADRIEL  12/13/22  08:11 EST

## 2022-12-16 DIAGNOSIS — Z98.890 S/P KNEE SURGERY: ICD-10-CM

## 2022-12-16 RX ORDER — HYDROCODONE BITARTRATE AND ACETAMINOPHEN 7.5; 325 MG/1; MG/1
1-2 TABLET ORAL EVERY 4 HOURS PRN
Qty: 42 TABLET | Refills: 0 | Status: SHIPPED | OUTPATIENT
Start: 2022-12-16 | End: 2023-02-14

## 2022-12-16 NOTE — TELEPHONE ENCOUNTER
Relationship: PATIENT    Best call back number: [phone]     Requested Prescriptions:    OXYCODONE 7.5-325 MG.     Pharmacy where request should be sent:  [facility] [phone]     Additional details provided by patient:  PT. HAS KNEE SURGERY WAS ON Monday.   HE NEEDS REFILL ON PAIN MEDS.     Does the patient have less than a 3 day supply:  [x] Yes  [] No    Would you like a call back once the refill request has been completed: [x] Yes [] No    If the office needs to give you a call back, can they leave a voicemail: [x] Yes [] No

## 2022-12-19 ENCOUNTER — TELEPHONE (OUTPATIENT)
Dept: ORTHOPEDIC SURGERY | Facility: HOSPITAL | Age: 67
End: 2022-12-19

## 2022-12-19 NOTE — TELEPHONE ENCOUNTER
Called and spoke with Mr. Quintanilla to see how he is doing as he is 1 week SP TKA. He said he is doing alright. Hasn't had any problems or issues. PT has been out to see him twice, they were supposed to come today but they never called yesterday so he doesn't  Know what is going on there. He said he continues to walk and do his exercises at home. Dressing looks good. He still has some pain but the medication makes it tolerable. BM's are normal. Mr. Cisneros doesn't have any questions for me at this time. He was given my contact information should he need anything. He voiced understanding and appreciated the call.

## 2022-12-20 ENCOUNTER — TELEPHONE (OUTPATIENT)
Dept: ORTHOPEDIC SURGERY | Facility: CLINIC | Age: 67
End: 2022-12-20

## 2022-12-20 NOTE — TELEPHONE ENCOUNTER
Sure if the patient is scheduled to see us in 3 weeks instead of 2, then we can have the physical therapist to discontinue his dressing to weeks from his date of surgery.

## 2022-12-20 NOTE — TELEPHONE ENCOUNTER
Spoke with patient, relayed per ADRIEL Goldstein that it is ok for physical therapist to remove outer waterproof bandage at the 2 week post op carlee. Patient was thankful for the call and verbalized understanding

## 2022-12-20 NOTE — TELEPHONE ENCOUNTER
Caller: KATYA   Relationship to Patient: SELF   Phone Number: [phone]   Reason for Call: PATIENT CALLING ASKING IF HE NEEDS TO HAVE HIS THERAPIST CHANGE HIS BANDAGE BECAUSE IN HIS POST OP INSTRUCTIONS IT STATES 2 WEEKS BUT HE DOESN'T COME BACK TIL 3 WEEKS

## 2022-12-27 ENCOUNTER — TELEPHONE (OUTPATIENT)
Dept: ORTHOPEDIC SURGERY | Facility: CLINIC | Age: 67
End: 2022-12-27

## 2022-12-27 NOTE — TELEPHONE ENCOUNTER
Spoke with Elissa and let her know that she can take the black strip off as well and apply steri strips if need be.

## 2022-12-27 NOTE — TELEPHONE ENCOUNTER
Caller: RODRÍGUEZ    Relationship: [facility]    Best call back number:     What was the call regarding: RODRÍGUEZ STATED WHEN SHE TOOK THE OUTER BANDAGE OFF THERE WAS A BLACK STRIP THAT GOES DOWN THE INCISION. SHE WOULD LIKE TO KNOW IF THAT BLACK STRIP SHOULD STAY ON OR BE TAKEN OFF.     Do you require a callback: YES

## 2023-01-03 ENCOUNTER — OFFICE VISIT (OUTPATIENT)
Dept: ORTHOPEDIC SURGERY | Facility: CLINIC | Age: 68
End: 2023-01-03
Payer: MEDICARE

## 2023-01-03 VITALS — TEMPERATURE: 97.1 F | WEIGHT: 310 LBS | HEIGHT: 73 IN | BODY MASS INDEX: 41.08 KG/M2

## 2023-01-03 DIAGNOSIS — Z96.652 STATUS POST LEFT KNEE REPLACEMENT: Primary | ICD-10-CM

## 2023-01-03 PROCEDURE — 1159F MED LIST DOCD IN RCRD: CPT | Performed by: ORTHOPAEDIC SURGERY

## 2023-01-03 PROCEDURE — 99024 POSTOP FOLLOW-UP VISIT: CPT | Performed by: ORTHOPAEDIC SURGERY

## 2023-01-03 PROCEDURE — 1160F RVW MEDS BY RX/DR IN RCRD: CPT | Performed by: ORTHOPAEDIC SURGERY

## 2023-01-03 NOTE — PROGRESS NOTES
John Paul Cisneros : 1955 MRN: [id number] DATE: 1/3/2023    DIAGNOSIS: 2 week follow up left total knee      SUBJECTIVE:Patient returns today for 2 week follow up of left total knee replacement. Patient reports doing well with no unusual complaints. Appears to be progressing appropriately.    OBJECTIVE:   Exam:. The incision is healing appropriately. No sign of infection. Range of motion is progressing as expected. The calf is soft and nontender with a negative Homans sign.    ASSESSMENT: 2 week status post left knee replacement.    PLAN: 1) Staples removed and steri strips applied   2) Order given for PT   3) Discontinue RITCHIE hose   4) Continue ice PRN   5) Xarelto for 1 week   6) Follow up in 6 weeks with repeat Xrays of left knee (3views)    Wilfred Serrato MD  1/3/2023

## 2023-01-23 NOTE — TELEPHONE ENCOUNTER
Caller: John Paul Cisneros    Relationship: Self    Best call back number:     What is the best time to reach you: ANY    Who are you requesting to speak with (clinical staff, provider,  specific staff member): CLINICAL    What was the call regarding: PATIENT IS HAVING A DENTAL PROCEDURE ON FEBRUARY 16TH AND NEED ANTIBIOTICS BEFORE PROCEDURE HE USES [facility] IN [place]    Do you require a callback: YES

## 2023-01-24 RX ORDER — CEPHALEXIN 500 MG/1
CAPSULE ORAL
Qty: 4 CAPSULE | Refills: 5 | Status: SHIPPED | OUTPATIENT
Start: 2023-01-24

## 2023-02-14 ENCOUNTER — OFFICE VISIT (OUTPATIENT)
Dept: ORTHOPEDIC SURGERY | Facility: CLINIC | Age: 68
End: 2023-02-14
Payer: MEDICARE

## 2023-02-14 VITALS — BODY MASS INDEX: 41.75 KG/M2 | WEIGHT: 315 LBS | TEMPERATURE: 96.8 F | HEIGHT: 73 IN

## 2023-02-14 DIAGNOSIS — M25.561 CHRONIC PAIN OF RIGHT KNEE: ICD-10-CM

## 2023-02-14 DIAGNOSIS — G89.29 CHRONIC PAIN OF RIGHT KNEE: ICD-10-CM

## 2023-02-14 DIAGNOSIS — Z96.652 STATUS POST LEFT KNEE REPLACEMENT: ICD-10-CM

## 2023-02-14 DIAGNOSIS — R52 PAIN: Primary | ICD-10-CM

## 2023-02-14 DIAGNOSIS — Z96.651 S/P TKR (TOTAL KNEE REPLACEMENT), RIGHT: ICD-10-CM

## 2023-02-14 PROCEDURE — 73562 X-RAY EXAM OF KNEE 3: CPT | Performed by: NURSE PRACTITIONER

## 2023-02-14 PROCEDURE — 99213 OFFICE O/P EST LOW 20 MIN: CPT | Performed by: NURSE PRACTITIONER

## 2023-02-14 RX ORDER — MELOXICAM 15 MG/1
TABLET ORAL
COMMUNITY
Start: 2023-01-20

## 2023-02-14 RX ORDER — TAMSULOSIN HYDROCHLORIDE 0.4 MG/1
0.4 CAPSULE ORAL
COMMUNITY
Start: 2023-01-11

## 2023-02-14 NOTE — PROGRESS NOTES
John Paul Cisneros : 1955 MRN: [id number] DATE: 2023    DIAGNOSIS: 8 week follow up left total knee / right knee pain    SUBJECTIVE:Patient returns today for 8 week follow up of left total knee replacement. Patient reports doing well with no unusual complaints. Appears to be progressing appropriately.  In regards to the patient's right knee he states he is got chronic anterior right knee pain that is around the superior portion of the patella.  Patient states this is progressively worsening with time.  Reports difficulty with going up and down steps or flexing the knee.    OBJECTIVE:   Exam: Left Knee -  The incision is well healed. No sign of infection. Range of motion is measured at 0 to 130. The calf is soft and nontender with a negative Homans sign. Strength is progressing and the patient is ambulating appropriately.    Right Knee -the incision is well-healed.  No sign of infection.  Range of motion is measured at 0 - 130.  The calf is soft nontender with a negative Homans' sign.  Patient does have some slight discomfort around the kneecap with extension of flexion.    DIAGNOSTIC STUDIES  Xrays: 3 views of the left knee (AP, lateral, and sunrise) were ordered and reviewed for evaluation of recent knee replacement. They demonstrate a well positioned, well aligned knee replacement without complicating factors noted. In comparison with previous films there has been no change.    ASSESSMENT: 8 week status post left knee replacement with right anterior knee pain    PLAN: 1) Continue with PT exercises as prescribed   2) Will order a NM Bone scan for right anterior knee pain   3) if patient bone scan is positive we will likely need to schedule for right   patella resurfacing    ADRIEL Abbasi  2023     This patient was seen in conjunction today with Dr. Wilfred Serrato.  Dr. Serrato agrees with the above-stated assessment and plan.

## 2023-03-08 ENCOUNTER — HOSPITAL ENCOUNTER (OUTPATIENT)
Dept: NUCLEAR MEDICINE | Facility: HOSPITAL | Age: 68
Discharge: HOME | End: 2023-03-08
Payer: MEDICARE

## 2023-03-08 DIAGNOSIS — Z96.651 S/P TKR (TOTAL KNEE REPLACEMENT), RIGHT: ICD-10-CM

## 2023-03-08 PROCEDURE — A9503 TC99M MEDRONATE: HCPCS | Performed by: NURSE PRACTITIONER

## 2023-03-08 PROCEDURE — 78315 BONE IMAGING 3 PHASE: CPT

## 2023-03-08 PROCEDURE — 0 TECHNETIUM MEDRONATE KIT: Performed by: NURSE PRACTITIONER

## 2023-03-08 RX ORDER — TC 99M MEDRONATE 20 MG/10ML
21.6 INJECTION, POWDER, LYOPHILIZED, FOR SOLUTION INTRAVENOUS
Status: COMPLETED | OUTPATIENT
Start: 2023-03-08 | End: 2023-03-08

## 2023-03-08 RX ADMIN — Medication 21.6 MILLICURIE: at 11:49

## 2023-03-16 ENCOUNTER — TELEPHONE (OUTPATIENT)
Dept: ORTHOPEDIC SURGERY | Facility: CLINIC | Age: 68
End: 2023-03-16
Payer: MEDICARE

## 2023-03-16 NOTE — PROGRESS NOTES
This patient has a positive bone scan result.  Please schedule this patient a follow-up visit with Dr. Serrato at one of his next available appointments so they can go over these results and further treatment recommendations in clinic.

## 2023-03-16 NOTE — TELEPHONE ENCOUNTER
----- Message from Patricia Eddy sent at 3/16/2023 12:44 PM EDT -----    ----- Message -----  From: Milo Major APRN  Sent: 3/16/2023   8:43 AM EDT  To: [facility]    This patient has a positive bone scan result.  Please schedule this patient a follow-up visit with Dr. Serrato at one of his next available appointments so they can go over these results and further treatment recommendations in clinic.

## 2023-03-28 NOTE — PROGRESS NOTES
Patient is scheduled to see Dr. Serrato on April 4.  He will go over these results as well as further treatment recommendations at that time.

## 2023-04-04 ENCOUNTER — OFFICE VISIT (OUTPATIENT)
Dept: ORTHOPEDIC SURGERY | Facility: CLINIC | Age: 68
End: 2023-04-04
Payer: MEDICARE

## 2023-04-04 VITALS — HEIGHT: 73 IN | TEMPERATURE: 97.7 F | WEIGHT: 315 LBS | BODY MASS INDEX: 41.75 KG/M2

## 2023-04-04 DIAGNOSIS — M25.561 RIGHT KNEE PAIN, UNSPECIFIED CHRONICITY: Primary | ICD-10-CM

## 2023-04-04 DIAGNOSIS — Z96.653 STATUS POST BILATERAL KNEE REPLACEMENTS: ICD-10-CM

## 2023-04-04 PROCEDURE — 1160F RVW MEDS BY RX/DR IN RCRD: CPT | Performed by: ORTHOPAEDIC SURGERY

## 2023-04-04 PROCEDURE — 1159F MED LIST DOCD IN RCRD: CPT | Performed by: ORTHOPAEDIC SURGERY

## 2023-04-04 PROCEDURE — 99213 OFFICE O/P EST LOW 20 MIN: CPT | Performed by: ORTHOPAEDIC SURGERY

## 2023-04-04 NOTE — PROGRESS NOTES
Patient: John Paul Cisneros  YOB: 1955 67 y.o. male  Medical Record Number: [id number]    Chief Complaints:   Chief Complaint   Patient presents with   • Right Knee - Follow-up       History of Present Illness:John Paul Cisneros is a 67 y.o. male who presents for follow-up of bilateral anterior knee pain he has gained some weight of recent and is having more pain on the anterior aspect of the right greater than left knee especially with stairs or rising from a chair.  He had recent bone scan and is here for follow-up.    Allergies:   Allergies   Allergen Reactions   • Oxycodone Other (See Comments)     ABDOMINAL Cramps   • Propoxyphene Other (See Comments)     Floating in the clouds   • Adhesive Tape Hives, Itching and Rash     EKG PATCHES   • Indapamide Cough       Medications:   Current Outpatient Medications   Medication Sig Dispense Refill   • acetaminophen (TYLENOL) 500 MG tablet Take 1 tablet by mouth Every 6 (Six) Hours As Needed for Mild Pain.     • acyclovir (ZOVIRAX) 200 MG capsule Take 1 capsule by mouth As Needed.     • amLODIPine (NORVASC) 10 MG tablet Take 1 tablet by mouth Daily.     • atorvastatin (LIPITOR) 80 MG tablet Take 40 mg by mouth Daily.     • carvedilol (COREG) 12.5 MG tablet Take 1 tablet by mouth 2 (Two) Times a Day With Meals.     • cephalexin (Keflex) 500 MG capsule TAKE 4 TABLETS 1 HOUR PRIOR TO PROCEDURE 4 capsule 5   • cyanocobalamin (VITAMIN B-12) 1000 MCG tablet Take 1 tablet by mouth Daily.     • Diclofenac Sodium 3 % gel gel Apply  topically to the appropriate area as directed 2 (Two) Times a Day. 112 g 11   • febuxostat (ULORIC) 40 MG tablet Take 1 tablet by mouth Daily.     • irbesartan (AVAPRO) 300 MG tablet Take 1 tablet by mouth Daily.     • meloxicam (MOBIC) 15 MG tablet      • metFORMIN ER (GLUCOPHAGE-XR) 500 MG 24 hr tablet 1 tablet Every Evening.     • omeprazole (priLOSEC) 40 MG capsule Take 1 capsule by mouth Daily.     • ondansetron (Zofran) 4 MG tablet  "Take 1 tablet by mouth Every 8 (Eight) Hours As Needed for Nausea or Vomiting for up to 10 doses. 10 tablet 0   • rivaroxaban (Xarelto) 10 MG tablet Take 1 tablet by mouth Daily. 30 tablet 2   • sucralfate (CARAFATE) 1 g tablet Take 1 tablet by mouth 4 (Four) Times a Day. 120 tablet 3   • tamsulosin (FLOMAX) 0.4 MG capsule 24 hr capsule Take 1 capsule by mouth.     • Testosterone 30 MG/ACT solution Apply  topically to the appropriate area as directed. ONE PUMP DAILY       No current facility-administered medications for this visit.     Facility-Administered Medications Ordered in Other Visits   Medication Dose Route Frequency Provider Last Rate Last Admin   • Chlorhexidine Gluconate Cloth 2 % pads   Apply externally BID Joanne Ortiz APRN       • lactated ringers infusion  100 mL/hr Intravenous Continuous Hermila Sutton CRNA   New Bag at 12/12/22 1340         The following portions of the patient's history were reviewed and updated as appropriate: allergies, current medications, past family history, past medical history, past social history, past surgical history and problem list.    Review of Systems:   Pertinent positives/negative listed in HPI above    Physical Exam:   Vitals:    04/04/23 0806   Temp: 97.7 °F (36.5 °C)   TempSrc: Temporal   Weight: (!) 152 kg (334 lb)   Height: 185.4 cm (72.99\")   PainSc:   3   PainLoc: Knee       General: A and O x 3, ASA, NAD      Knee Exam List: Knee:  bilateral    ALIGNMENT:     Neutral  ,   Patella  tracks  Midline without crepitance    GAIT:    Nonantalgic    SKIN:    Well healed midline incision, no erythema or fluctuance    RANGE OF MOTION:   0-120   DEG    STRENGTH:   5  / 5    LIGAMENTS:    No varus / valgus instability.   No  Flexion   instability.       DISTAL PULSES:    Paplable    DISTAL SENSATION :   Intact    LYMPHATICS:     No   lymphadenopathy    OTHER:     No   Effusion      Calf soft / nontender ,   Negative Judy's sign            Radiology:  Xrays " 3views bilateral knees (ap,lateral, sunrise) taken previously demonstratinga well positioned knee replacement without evidence of wear, loosening or osteolysis in both knees the right patellar surface is on resurfaced      Bone scan shows mild uptake about the patella the left knee which was not specifically evaluated shows expected uptake a year out from knee replacement    Assessment/Plan:  Right greater than left anterior knee pain in the right patella is on resurfaced the bone scan is not overly intense but there is some mild uptake.  His symptoms do appear to be localized to the patella especially with knee flexion exercises.  We discussed an option which would be selective resurfacing of the patella.  He is going to think it over and get a see him back in 6 months.  For now we will have him work on quad strengthening avoidance of deep knee flexion exercises and weight management.  We will repeat x-rays including a good sunrise view when he returns in 6 months      There are no diagnoses linked to this encounter.    Wilfred Serrato MD  4/4/2023

## 2023-10-05 ENCOUNTER — OFFICE VISIT (OUTPATIENT)
Dept: ORTHOPEDIC SURGERY | Facility: CLINIC | Age: 68
End: 2023-10-05
Payer: MEDICARE

## 2023-10-05 VITALS — TEMPERATURE: 97.5 F | WEIGHT: 315 LBS | BODY MASS INDEX: 41.75 KG/M2 | HEIGHT: 73 IN

## 2023-10-05 DIAGNOSIS — R52 PAIN: Primary | ICD-10-CM

## 2023-10-05 DIAGNOSIS — Z96.653 STATUS POST BILATERAL KNEE REPLACEMENTS: ICD-10-CM

## 2023-10-05 PROCEDURE — 99213 OFFICE O/P EST LOW 20 MIN: CPT | Performed by: ORTHOPAEDIC SURGERY

## 2023-10-05 NOTE — PROGRESS NOTES
Patient: John Paul Cisneros  YOB: 1955 68 y.o. male  Medical Record Number: [id number]    Chief Complaints:   Chief Complaint   Patient presents with    Right Knee - Follow-up       History of Present Illness:John Paul Cisneros is a 68 y.o. male who presents for follow-up of both knee replacements right patella was unresurfaced he has been having some anterior knee discomfort more popping on the left side.  Right knee is less painful than his week.  He is working on a sta"[employer]aster feels that he is improving from a strength standpoint    Allergies:   Allergies   Allergen Reactions    Oxycodone Other (See Comments)     ABDOMINAL Cramps    Propoxyphene Other (See Comments)     Floating in the clouds    Adhesive Tape Hives, Itching and Rash     EKG PATCHES    Indapamide Cough       Medications:   Current Outpatient Medications   Medication Sig Dispense Refill    acetaminophen (TYLENOL) 500 MG tablet Take 1 tablet by mouth Every 6 (Six) Hours As Needed for Mild Pain.      acyclovir (ZOVIRAX) 200 MG capsule Take 1 capsule by mouth As Needed.      amLODIPine (NORVASC) 10 MG tablet Take 1 tablet by mouth Daily.      atorvastatin (LIPITOR) 80 MG tablet Take 0.5 tablets by mouth Daily.      carvedilol (COREG) 12.5 MG tablet Take 1 tablet by mouth 2 (Two) Times a Day With Meals.      cephalexin (Keflex) 500 MG capsule TAKE 4 TABLETS 1 HOUR PRIOR TO PROCEDURE 4 capsule 5    cyanocobalamin (VITAMIN B-12) 1000 MCG tablet Take 1 tablet by mouth Daily.      febuxostat (ULORIC) 40 MG tablet Take 1 tablet by mouth Daily.      irbesartan (AVAPRO) 300 MG tablet Take 1 tablet by mouth Daily.      meloxicam (MOBIC) 15 MG tablet       metFORMIN ER (GLUCOPHAGE-XR) 500 MG 24 hr tablet 1 tablet Every Evening.      omeprazole (priLOSEC) 40 MG capsule Take 1 capsule by mouth Daily.      Testosterone 30 MG/ACT solution Apply  topically to the appropriate area as directed. ONE PUMP DAILY      Diclofenac Sodium 3 % gel gel Apply   "topically to the appropriate area as directed 2 (Two) Times a Day. (Patient not taking: Reported on 10/5/2023) 112 g 11    ondansetron (Zofran) 4 MG tablet Take 1 tablet by mouth Every 8 (Eight) Hours As Needed for Nausea or Vomiting for up to 10 doses. (Patient not taking: Reported on 10/5/2023) 10 tablet 0    rivaroxaban (Xarelto) 10 MG tablet Take 1 tablet by mouth Daily. (Patient not taking: Reported on 10/5/2023) 30 tablet 2    sucralfate (CARAFATE) 1 g tablet Take 1 tablet by mouth 4 (Four) Times a Day. (Patient not taking: Reported on 10/5/2023) 120 tablet 3    tamsulosin (FLOMAX) 0.4 MG capsule 24 hr capsule Take 1 capsule by mouth. (Patient not taking: Reported on 10/5/2023)       No current facility-administered medications for this visit.     Facility-Administered Medications Ordered in Other Visits   Medication Dose Route Frequency Provider Last Rate Last Admin    Chlorhexidine Gluconate Cloth 2 % pads   Apply externally BID Joanne Ortiz APRN        lactated ringers infusion  100 mL/hr Intravenous Continuous Hermila Sutton CRNA   New Bag at 12/12/22 1340         The following portions of the patient's history were reviewed and updated as appropriate: allergies, current medications, past family history, past medical history, past social history, past surgical history and problem list.    Review of Systems:   Pertinent positives/negative listed in HPI above    Physical Exam:   Vitals:    10/05/23 0924   Temp: 97.5 °F (36.4 °C)   Weight: (!) 146 kg (321 lb)   Height: 185.4 cm (73\")   PainSc:   5   PainLoc: Knee       General: A and O x 3, ASA, NAD   There is mild anterior left knee crepitus with flexion is not painful the right knee minimal crepitus good range of motion no instability no effusion       Radiology:  Xrays 3views right knee (ap,lateral, sunrise) were ordered and reviewed for evaluation of knee pain demonstratinga well positioned knee replacement on resurfaced patella without evidence of " wear, loosening or osteolysis  todays xrays were compared to previous xrays and demonstrate no change    Assessment/Plan:  Bilateral anterior knee pain and weakness is more weakness than his pain so I think he needs to continue to focus on continued quad strengthening and weight loss.  I do not think there is a good surgical option at this point.  I will see him back as needed.      Diagnoses and all orders for this visit:    1. Pain (Primary)  -     XR Knee 3 View Right        Wilfred Serrato MD  10/5/2023

## 2023-10-12 ENCOUNTER — OFFICE VISIT (OUTPATIENT)
Dept: SLEEP MEDICINE | Facility: HOSPITAL | Age: 68
End: 2023-10-12
Payer: MEDICARE

## 2023-10-12 VITALS — OXYGEN SATURATION: 97 % | HEART RATE: 57 BPM | BODY MASS INDEX: 42.66 KG/M2 | WEIGHT: 315 LBS | HEIGHT: 72 IN

## 2023-10-12 DIAGNOSIS — G47.33 OSA ON CPAP: Primary | ICD-10-CM

## 2023-10-12 DIAGNOSIS — G47.22 CIRCADIAN RHYTHM SLEEP DISORDER, ADVANCED SLEEP PHASE TYPE: ICD-10-CM

## 2023-10-12 DIAGNOSIS — G47.26 CIRCADIAN RHYTHM SLEEP DISORDER, SHIFT WORK TYPE: ICD-10-CM

## 2023-10-12 DIAGNOSIS — G47.14 HYPERSOMNIA DUE TO ANOTHER MEDICAL CONDITION: ICD-10-CM

## 2023-10-12 DIAGNOSIS — E66.01 CLASS 3 SEVERE OBESITY DUE TO EXCESS CALORIES WITH SERIOUS COMORBIDITY AND BODY MASS INDEX (BMI) OF 40.0 TO 44.9 IN ADULT: ICD-10-CM

## 2023-10-12 PROCEDURE — G0463 HOSPITAL OUTPT CLINIC VISIT: HCPCS

## 2023-10-12 NOTE — PROGRESS NOTES
"Follow Up [facility] Note     Chief Complaint:  ANNA     Primary Care Physician: Igor Maher MD    Interval History:   The patient is a 68 y.o. male  who I last saw 10/6/2022 and that note was reviewed.  The patient reports he is doing well without new complaints.  He goes to bed at 8 PM and gets out of bed at 5 AM.  He will use the restroom during that time.    Review of Systems:    A complete review of systems was done and all were negative with the exception of the above    Social History:    Social History     Socioeconomic History    Marital status:    Tobacco Use    Smoking status: Never    Smokeless tobacco: Never   Vaping Use    Vaping Use: Never used   Substance and Sexual Activity    Alcohol use: No    Drug use: No    Sexual activity: Defer       Allergies:  Oxycodone, Propoxyphene, Adhesive tape, and Indapamide     Medication Review:  Reviewed.      Vital Signs:    Vitals:    10/12/23 0700   Pulse: 57   SpO2: 97%   Weight: (!) 149 kg (328 lb)   Height: 182.9 cm (72.01\")     Body mass index is 44.47 kg/mý.    Physical Exam:    Constitutional:  Well developed 68 y.o. male that appears in no apparent distress.  Awake & oriented times 3.  Normal mood with normal recent and remote memory and normal judgement.  Eyes:  Conjunctivae normal.  Oropharynx: Previously, moist mucous membranes without exudate and a normal-sized tongue and class III Mallampati airway.    Self-administered [place] Sleepiness Scale test results: 11, previously 14 and prior to that 13  0-5 Lower normal daytime sleepiness  6-10 Higher normal daytime sleepiness  11-12 Mild, 13-15 Moderate, & 16-24 Severe excessive daytime sleepiness     Downloaded PAP Data Evaluated For Therapeutic Response and Compliance:  DME is [facility] and he uses nasal pillows.  Downloads between 7/13 and 10/10/2023 compliance 100%.  Average usage is 8 hours and 43 minutes.  Average AHI is normal without a significant leak.  Average auto CPAP " pressure is 14.3 and his ResMed auto CPAP is 12-20    I have reviewed the above results and compared them with the patient's last downloads and reviewed with the patient.    Impression:   Obstructive sleep apnea adequately treated with ResMed auto CPAP. The patient appears to be at goal with good compliance and usage. The patient has some persistent complaints of hypersomnolence.  Circadian rhythm sleep disorder, advanced sleep phase type, shiftwork type    Plan:  Good sleep hygiene measures should be maintained.  Weight loss would be beneficial in this patient who has class III severe obesity by Body mass index is 44.47 kg/mý..      After evaluating the patient and assessing results available, the patient is benefiting from the treatment being provided.     The patient will continue ResMed auto CPAP.  Potential side effects of CPAP therapy reviewed and addressed as needed.  After clinical evaluation and review of downloads, I recommend no changes to the patient's pressures.  A new prescription will be sent to the patient's DME.    I answered all of the patient's questions.  The patient will call the [facility] for any problems with side effects of CPAP therapy and will follow up in 1 year.      John Abernathy MD  Sleep Medicine  10/12/23  08:35 EDT

## 2023-11-09 ENCOUNTER — OFFICE VISIT (OUTPATIENT)
Dept: ORTHOPEDIC SURGERY | Facility: CLINIC | Age: 68
End: 2023-11-09
Payer: MEDICARE

## 2023-11-09 VITALS — BODY MASS INDEX: 41.75 KG/M2 | HEIGHT: 73 IN | TEMPERATURE: 96.8 F | WEIGHT: 315 LBS

## 2023-11-09 DIAGNOSIS — R52 PAIN: Primary | ICD-10-CM

## 2023-11-09 DIAGNOSIS — M16.12 PRIMARY OSTEOARTHRITIS OF LEFT HIP: ICD-10-CM

## 2023-11-09 PROCEDURE — 99213 OFFICE O/P EST LOW 20 MIN: CPT | Performed by: NURSE PRACTITIONER

## 2023-11-16 ENCOUNTER — OFFICE VISIT (OUTPATIENT)
Dept: ORTHOPEDIC SURGERY | Facility: CLINIC | Age: 68
End: 2023-11-16
Payer: MEDICARE

## 2023-11-16 VITALS — BODY MASS INDEX: 39.81 KG/M2 | HEIGHT: 73 IN | WEIGHT: 300.4 LBS | TEMPERATURE: 97.5 F

## 2023-11-16 DIAGNOSIS — M16.12 PRIMARY OSTEOARTHRITIS OF LEFT HIP: Primary | ICD-10-CM

## 2023-11-16 PROBLEM — M16.9 OA (OSTEOARTHRITIS) OF HIP: Status: ACTIVE | Noted: 2023-11-16

## 2023-11-16 PROCEDURE — 99214 OFFICE O/P EST MOD 30 MIN: CPT | Performed by: ORTHOPAEDIC SURGERY

## 2023-11-16 RX ORDER — CHLORHEXIDINE GLUCONATE 500 MG/1
CLOTH TOPICAL 2 TIMES DAILY
OUTPATIENT
Start: 2023-11-16

## 2023-11-16 RX ORDER — PREGABALIN 150 MG/1
150 CAPSULE ORAL ONCE
OUTPATIENT
Start: 2023-11-16 | End: 2023-11-16

## 2023-11-16 RX ORDER — MELOXICAM 7.5 MG/1
15 TABLET ORAL ONCE
OUTPATIENT
Start: 2023-11-16 | End: 2023-11-16

## 2023-11-16 NOTE — PROGRESS NOTES
Patient: John Paul Cisneros  YOB: 1955 68 y.o. male  Medical Record Number: [id number]    Chief Complaint:   Chief Complaint   Patient presents with    Left Hip - Follow-up, Pain       History of Present Illness:John Paul Cisneros is a 68 y.o. male who presents for follow-up of left hip.  He has severe progressive stabbing pain within the left groin and buttock region it has markedly worsened to the point where he cannot perform basic activities of daily living he has difficulty walking even short distances across the room he has problems putting shoes and socks on getting in and out of car.  It is not improved with anti-inflammatories.    Allergies:   Allergies   Allergen Reactions    Oxycodone Other (See Comments)     ABDOMINAL Cramps    Propoxyphene Other (See Comments)     Floating in the clouds    Adhesive Tape Hives, Itching and Rash     EKG PATCHES    Indapamide Cough       Medications:   Current Outpatient Medications   Medication Sig Dispense Refill    acetaminophen (TYLENOL) 500 MG tablet Take 1 tablet by mouth Every 6 (Six) Hours As Needed for Mild Pain.      acyclovir (ZOVIRAX) 200 MG capsule Take 1 capsule by mouth As Needed.      amLODIPine (NORVASC) 10 MG tablet Take 1 tablet by mouth Daily.      atorvastatin (LIPITOR) 80 MG tablet Take 0.5 tablets by mouth Daily.      carvedilol (COREG) 12.5 MG tablet Take 1 tablet by mouth 2 (Two) Times a Day With Meals.      cephalexin (Keflex) 500 MG capsule TAKE 4 TABLETS 1 HOUR PRIOR TO PROCEDURE 4 capsule 5    cyanocobalamin (VITAMIN B-12) 1000 MCG tablet Take 1 tablet by mouth Daily.      Diclofenac Sodium 3 % gel gel Apply  topically to the appropriate area as directed 2 (Two) Times a Day. 112 g 11    febuxostat (ULORIC) 40 MG tablet Take 1 tablet by mouth Daily.      irbesartan (AVAPRO) 300 MG tablet Take 1 tablet by mouth Daily.      meloxicam (MOBIC) 15 MG tablet       metFORMIN ER (GLUCOPHAGE-XR) 500 MG 24 hr tablet 1 tablet Every Evening.    "   omeprazole (priLOSEC) 40 MG capsule Take 1 capsule by mouth Daily.      ondansetron (Zofran) 4 MG tablet Take 1 tablet by mouth Every 8 (Eight) Hours As Needed for Nausea or Vomiting for up to 10 doses. 10 tablet 0    rivaroxaban (Xarelto) 10 MG tablet Take 1 tablet by mouth Daily. 30 tablet 2    sucralfate (CARAFATE) 1 g tablet Take 1 tablet by mouth 4 (Four) Times a Day. 120 tablet 3    tamsulosin (FLOMAX) 0.4 MG capsule 24 hr capsule Take 1 capsule by mouth.      Testosterone 30 MG/ACT solution Apply  topically to the appropriate area as directed. ONE PUMP DAILY       No current facility-administered medications for this visit.     Facility-Administered Medications Ordered in Other Visits   Medication Dose Route Frequency Provider Last Rate Last Admin    Chlorhexidine Gluconate Cloth 2 % pads   Apply externally BID Joanne Ortiz APRN        lactated ringers infusion  100 mL/hr Intravenous Continuous Hermila Sutton CRNA   New Bag at 12/12/22 1340         The following portions of the patient's history were reviewed and updated as appropriate: allergies, current medications, past family history, past medical history, past social history, past surgical history and problem list.    Review of Systems:   Pertinent positives/negative listed in HPI above    Physical Exam:   Vitals:    11/16/23 0851   Temp: 97.5 °F (36.4 °C)   TempSrc: Temporal   Weight: (!) 136 kg (300 lb 6.4 oz)   Height: 185.4 cm (73\")   PainSc: 10-Worst pain ever   PainLoc: Hip       General: A and O x 3, ASA, NAD      Hip:  left    LEG ALIGNMENT:     Neutral        LEG LENGTH DISCREPANCY   :    right longer by 0.5 cm    GAIT:     Antalgic    SKIN:     No abnormality    RANGE OF MOTION:     Limited by joint irritability    STRENGTH:     Limited by joint irratibility    DISTAL PULSES:    Paplable    DISTAL SENSATION :   Intact    LYMPHATICS:     No   lymphadenopathy    OTHER:          +   Stinchfeld test      -    log roll      -   Tenderness " to palpation trochanteric bursa      Radiology:    Xrays 2views left hip (AP bilateral hips and lateral hip) taken previously demonstrating advanced, end-satge osteoarthritis with bone on bone articluation, periarticular osteophytes, and subchondral cysts      Assessment/Plan:  Severe advanced end-stage left hip osteoarthritis  Continuation of conservative management vs. VERO discussed.  The patient wishes to proceed with total hip replacement.  At this point the patient has failed the full gamut of conservative treatment and stating complete understanding of the risks/benefits/ anternatives wishes to proceed with surgical treatment.    Risk and benefits of surgery were reviewed.  Including, but not limited to, blood clots, anesthesia risk, infection, leg length discrepancy, fracture, skin/leg numbness, failure of the implant, need for future surgeries, continued pain, hematoma, need for transfusion, and death, among others.  The patient understands and wishes to proceed.     The spectrum of treatment options were discussed with the patient in detail including both the nonoperative and operative treatment modalities and their respective risks and benefits.  After thorough discussion, the patient has elected to undergo surgical treatment.  The details of the surgical procedure were explained including the location of probable incisions and a description of the likely implants to be used.  Models and diagrams were used as educational resources. The patient understands the likely convalescence after surgery, as well as the rehabilitation required.  We thoroughly discussed the risks, benefits, and alternatives to surgery.  The risks include but are not limited to the risk of infection, joint stiffness, blood clots (including DVT and/or pulmonary embolus along with the risk of death), neurologic and/or vascular injury, fracture, dislocation, nonunion, malunion, need for further surgery including hardware failure requiring  revision, and continued pain.  It was explained that if tissue has been repaired or reconstructed, there is also a chance of failure which may require further management.  Following the completion of the discussion, the patient expressed understanding of this planned course of care, all their questions were answered and consent will be obtained preoperatively.    Operative Plan: Posterior approach Total Hip Replacement 23 HR STAY he has a history of pulmonary embolism about 9 years ago following gallbladder surgery.  He has done well following his knee replacement surgeries.  We will plan on placing him on Xarelto 10 mg daily for a month postsurgery.  We will keep him overnight and ultrasound his legs in the morning prior to discharge.    There are no diagnoses linked to this encounter.    Wilfred Serrato MD  11/16/2023

## 2024-01-10 NOTE — ADDENDUM NOTE
Addended by: MJ GRECO on: 8/5/2019 04:55 PM     Modules accepted: Orders    
no redness or swelling at iv site/Arm band on/IV intact/Admission wristband placed

## 2024-04-18 ENCOUNTER — PRE-ADMISSION TESTING (OUTPATIENT)
Dept: PREADMISSION TESTING | Facility: HOSPITAL | Age: 69
End: 2024-04-18
Payer: MEDICARE

## 2024-04-18 VITALS
SYSTOLIC BLOOD PRESSURE: 155 MMHG | OXYGEN SATURATION: 97 % | HEART RATE: 63 BPM | BODY MASS INDEX: 41.75 KG/M2 | HEIGHT: 73 IN | DIASTOLIC BLOOD PRESSURE: 70 MMHG | RESPIRATION RATE: 20 BRPM | TEMPERATURE: 98.9 F | WEIGHT: 315 LBS

## 2024-04-18 LAB
ABO GROUP BLD: NORMAL
ANION GAP SERPL CALCULATED.3IONS-SCNC: 9.1 MMOL/L (ref 5–15)
BLD GP AB SCN SERPL QL: NEGATIVE
BUN SERPL-MCNC: 13 MG/DL (ref 8–23)
BUN/CREAT SERPL: 14.4 (ref 7–25)
CALCIUM SPEC-SCNC: 8.8 MG/DL (ref 8.6–10.5)
CHLORIDE SERPL-SCNC: 108 MMOL/L (ref 98–107)
CO2 SERPL-SCNC: 23.9 MMOL/L (ref 22–29)
CREAT SERPL-MCNC: 0.9 MG/DL (ref 0.76–1.27)
DEPRECATED RDW RBC AUTO: 40.1 FL (ref 37–54)
EGFRCR SERPLBLD CKD-EPI 2021: 91.9 ML/MIN/1.73
ERYTHROCYTE [DISTWIDTH] IN BLOOD BY AUTOMATED COUNT: 13.3 % (ref 12.3–15.4)
GLUCOSE SERPL-MCNC: 106 MG/DL (ref 65–99)
HCT VFR BLD AUTO: 42 % (ref 37.5–51)
HGB BLD-MCNC: 13.4 G/DL (ref 13–17.7)
MCH RBC QN AUTO: 26.5 PG (ref 26.6–33)
MCHC RBC AUTO-ENTMCNC: 31.9 G/DL (ref 31.5–35.7)
MCV RBC AUTO: 83 FL (ref 79–97)
PLATELET # BLD AUTO: 321 10*3/MM3 (ref 140–450)
PMV BLD AUTO: 9.3 FL (ref 6–12)
POTASSIUM SERPL-SCNC: 4.3 MMOL/L (ref 3.5–5.2)
RBC # BLD AUTO: 5.06 10*6/MM3 (ref 4.14–5.8)
RH BLD: POSITIVE
SODIUM SERPL-SCNC: 141 MMOL/L (ref 136–145)
T&S EXPIRATION DATE: NORMAL
WBC NRBC COR # BLD AUTO: 6.78 10*3/MM3 (ref 3.4–10.8)

## 2024-04-18 PROCEDURE — 80048 BASIC METABOLIC PNL TOTAL CA: CPT

## 2024-04-18 PROCEDURE — 85027 COMPLETE CBC AUTOMATED: CPT

## 2024-04-18 PROCEDURE — 93005 ELECTROCARDIOGRAM TRACING: CPT

## 2024-04-18 PROCEDURE — 36415 COLL VENOUS BLD VENIPUNCTURE: CPT

## 2024-04-18 PROCEDURE — 86901 BLOOD TYPING SEROLOGIC RH(D): CPT | Performed by: ORTHOPAEDIC SURGERY

## 2024-04-18 PROCEDURE — 86900 BLOOD TYPING SEROLOGIC ABO: CPT | Performed by: ORTHOPAEDIC SURGERY

## 2024-04-18 PROCEDURE — 86850 RBC ANTIBODY SCREEN: CPT | Performed by: ORTHOPAEDIC SURGERY

## 2024-04-18 PROCEDURE — 93010 ELECTROCARDIOGRAM REPORT: CPT | Performed by: INTERNAL MEDICINE

## 2024-04-18 RX ORDER — SODIUM FLUORIDE 6 MG/ML
PASTE, DENTIFRICE DENTAL
COMMUNITY
Start: 2023-11-21

## 2024-04-18 RX ORDER — AMOXICILLIN 500 MG/1
2000 CAPSULE ORAL AS NEEDED
COMMUNITY
Start: 2024-02-27

## 2024-04-18 RX ORDER — TESTOSTERONE 16.2 MG/G
GEL TRANSDERMAL EVERY MORNING
COMMUNITY
Start: 2024-03-06

## 2024-04-18 RX ORDER — TADALAFIL 5 MG/1
5 TABLET ORAL DAILY PRN
COMMUNITY

## 2024-04-18 NOTE — DISCHARGE INSTRUCTIONS
Take the following medications the morning of surgery:    Omeprazole   amldipine   atorvastatin   uloric   flomax   carafate  if needed    If you are on prescription narcotic pain medication to control your pain you may also take that medication the morning of surgery.    General Instructions:  Do not eat solid food after midnight the night before surgery.  You may drink clear liquids day of surgery but must stop at least one hour before your hospital arrival time.  It is beneficial for you to have a clear drink that contains carbohydrates the day of surgery.  We suggest a 12 to 20 ounce bottle of Gatorade or Powerade for non-diabetic patients or a 12 to 20 ounce bottle of G2 or Powerade Zero for diabetic patients. (Pediatric patients, are not advised to drink a 12 to 20 ounce carbohydrate drink)    Clear liquids are liquids you can see through.  Nothing red in color.     Plain water                               Sports drinks  Sodas                                   Gelatin (Jell-O)  Fruit juices without pulp such as white grape juice and apple juice  Popsicles that contain no fruit or yogurt  Tea or coffee (no cream or milk added)  Gatorade / Powerade  G2 / Powerade Zero    Infants may have breast milk up to four hours before surgery.  Infants drinking formula may drink formula up to six hours before surgery.   Patients who avoid smoking, chewing tobacco and alcohol for 4 weeks prior to surgery have a reduced risk of post-operative complications.  Quit smoking as many days before surgery as you can.  Do not smoke, use chewing tobacco or drink alcohol the day of surgery.   If applicable bring your C-PAP/ BI-PAP machine in with you to preop day of surgery.  Bring any papers given to you in the doctor’s office.  Wear clean comfortable clothes.  Do not wear contact lenses, false eyelashes or make-up.  Bring a case for your glasses.   Bring crutches or walker if applicable.  Remove all piercings.  Leave jewelry and any  other valuables at home.  Hair extensions with metal clips must be removed prior to surgery.  The Pre-Admission Testing nurse will instruct you to bring medications if unable to obtain an accurate list in Pre-Admission Testing.        If you were given a blood bank ID arm band remember to bring it with you the day of surgery.    Preventing a Surgical Site Infection:  For 2 to 3 days before surgery, avoid shaving with a razor because the razor can irritate skin and make it easier to develop an infection.    Any areas of open skin can increase the risk of a post-operative wound infection by allowing bacteria to enter and travel throughout the body.  Notify your surgeon if you have any skin wounds / rashes even if it is not near the expected surgical site.  The area will need assessed to determine if surgery should be delayed until it is healed.  The night prior to surgery shower using a fresh bar of anti-bacterial soap (such as Dial) and clean washcloth.  Sleep in a clean bed with clean clothing.  Do not allow pets to sleep with you.  Shower on the morning of surgery using a fresh bar of anti-bacterial soap (such as Dial) and clean washcloth.  Dry with a clean towel and dress in clean clothing.  Ask your surgeon if you will be receiving antibiotics prior to surgery.  Make sure you, your family, and all healthcare providers clean their hands with soap and water or an alcohol based hand  before caring for you or your wound.    Day of surgery:5/1/2024     Your arrival time is approximately two hours before your scheduled surgery time.  Upon arrival, a Pre-op nurse and Anesthesiologist will review your health history, obtain vital signs, and answer questions you may have.  The only belongings needed at this time will be a list of your home medications and if applicable your C-PAP/BI-PAP machine.  A Pre-op nurse will start an IV and you may receive medication in preparation for surgery, including something to help  you relax.     Please be aware that surgery does come with discomfort.  We want to make every effort to control your discomfort so please discuss any uncontrolled symptoms with your nurse.   Your doctor will most likely have prescribed pain medications.      If you are going home after surgery you will receive individualized written care instructions before being discharged.  A responsible adult must drive you to and from the hospital on the day of your surgery and ideally stay with you through the night.   .  Discharge prescriptions can be filled by the hospital pharmacy during regular pharmacy hours.  If you are having surgery late in the day/evening your prescription may be e-prescribed to your pharmacy.  Please verify your pharmacy hours or chose a 24 hour pharmacy to avoid not having access to your prescription because your pharmacy has closed for the day.    If you are staying overnight following surgery, you will be transported to your hospital room following the recovery period.  [facility] has all private rooms.    If you have any questions please call Pre-Admission Testing at [phone].  Deductibles and co-payments are collected on the day of service. Please be prepared to pay the required co-pay, deductible or deposit on the day of service as defined by your plan.    Call your surgeon immediately if you experience any of the following symptoms:  Sore Throat  Shortness of Breath or difficulty breathing  Cough  Chills  Body soreness or muscle pain  Headache  Fever  New loss of taste or smell  Do not arrive for your surgery ill.  Your procedure will need to be rescheduled to another time.  You will need to call your physician before the day of surgery to avoid any unnecessary exposure to hospital staff as well as other patients.  CHLORHEXIDINE CLOTH INSTRUCTIONS  The morning of surgery follow these instructions using the Chlorhexidine cloths you've been given.  These steps reduce bacteria  on the body.  Do not use the cloths near your eyes, ears mouth, genitalia or on open wounds.  Throw the cloths away after use but do not try to flush them down a toilet.      Open and remove one cloth at a time from the package.    Leave the cloth unfolded and begin the bathing.  Massage the skin with the cloths using gentle pressure to remove bacteria.  Do not scrub harshly.   Follow the steps below with one 2% CHG cloth per area (6 total cloths).  One cloth for neck, shoulders and chest.  One cloth for both arms, hands, fingers and underarms (do underarms last).  One cloth for the abdomen followed by groin.  One cloth for right leg and foot including between the toes.  One cloth for left leg and foot including between the toes.  The last cloth is to be used for the back of the neck, back and buttocks.    Allow the CHG to air dry 3 minutes on the skin which will give it time to work and decrease the chance of irritation.  The skin may feel sticky until it is dry.  Do not rinse with water or any other liquid or you will lose the beneficial effects of the CHG.  If mild skin irritation occurs, do rinse the skin to remove the CHG.  Report this to the nurse at time of admission.  Do not apply lotions, creams, ointments, deodorants or perfumes after using the clothes. Dress in clean clothes before coming to the hospital.

## 2024-04-19 LAB
QT INTERVAL: 411 MS
QTC INTERVAL: 386 MS

## 2024-04-25 ENCOUNTER — OFFICE VISIT (OUTPATIENT)
Dept: ORTHOPEDIC SURGERY | Facility: CLINIC | Age: 69
End: 2024-04-25
Payer: MEDICARE

## 2024-04-25 ENCOUNTER — TELEPHONE (OUTPATIENT)
Dept: ORTHOPEDIC SURGERY | Facility: HOSPITAL | Age: 69
End: 2024-04-25
Payer: MEDICARE

## 2024-04-25 VITALS
SYSTOLIC BLOOD PRESSURE: 152 MMHG | DIASTOLIC BLOOD PRESSURE: 70 MMHG | WEIGHT: 315 LBS | TEMPERATURE: 98 F | HEIGHT: 73 IN | BODY MASS INDEX: 41.75 KG/M2

## 2024-04-25 DIAGNOSIS — M16.12 PRIMARY OSTEOARTHRITIS OF LEFT HIP: Primary | ICD-10-CM

## 2024-04-25 NOTE — H&P (VIEW-ONLY)
Patient: John Paul Cisneros    Date of Admission: 5/1/2024    YOB: 1955    Medical Record Number: [id number]    Admitting Physician: Dr. Wilfred Serrato    Reason for Admission: End Stage Left Hip OA    History of Present Illness: 68 y.o. male presents with severe end stage hip osteoarthritis which has not been responsive to the full compliment of conservative measures. Despite conservative attempts, there is still severe, constant activity limiting hip pain. Given the severity of the pain, the patient has elected to proceed with hip replacement.    Allergies:   Allergies   Allergen Reactions    Oxycodone Other (See Comments)     ABDOMINAL Cramps    Propoxyphene Other (See Comments)     Floating in the clouds    Adhesive Tape Hives, Itching and Rash     EKG PATCHES    Indapamide Cough         Current Medications:  Home Medications:    Current Outpatient Medications on File Prior to Visit   Medication Sig    acetaminophen (TYLENOL) 500 MG tablet Take 2 tablets by mouth Every 6 (Six) Hours As Needed for Mild Pain.    acyclovir (ZOVIRAX) 200 MG capsule Take 1 capsule by mouth As Needed.    amLODIPine (NORVASC) 10 MG tablet Take 1 tablet by mouth Daily.    amoxicillin (AMOXIL) 500 MG capsule Take 4 capsules by mouth As Needed. Prior to dental visit    ASPIRIN 81 PO Take 81 mg by mouth Every Morning.    atorvastatin (LIPITOR) 80 MG tablet Take 1 tablet by mouth Daily.    carvedilol (COREG) 12.5 MG tablet Take 1 tablet by mouth 2 (Two) Times a Day With Meals.    cyanocobalamin (VITAMIN B-12) 1000 MCG tablet Take 1 tablet by mouth Daily.    febuxostat (ULORIC) 40 MG tablet Take 1 tablet by mouth Daily.    irbesartan (AVAPRO) 300 MG tablet Take 1 tablet by mouth Every Night.    meloxicam (MOBIC) 15 MG tablet Take 1 tablet by mouth Daily. To stop before surgery    metFORMIN ER (GLUCOPHAGE-XR) 500 MG 24 hr tablet Take 1 tablet by mouth 2 (Two) Times a Day.    omeprazole (priLOSEC) 40 MG capsule Take 1 capsule by  mouth Daily.    psyllium (METAMUCIL) 58.6 % packet Take 1 packet by mouth 2 (Two) Times a Day.    Sodium Fluoride (PreviDent 5000 Booster Plus) 1.1 % paste     sucralfate (CARAFATE) 1 g tablet Take 1 tablet by mouth 4 (Four) Times a Day. (Patient taking differently: Take 1 tablet by mouth 4 (Four) Times a Day As Needed.)    tadalafil (CIALIS) 5 MG tablet Take 1 tablet by mouth Daily As Needed for Erectile Dysfunction.    tamsulosin (FLOMAX) 0.4 MG capsule 24 hr capsule Take 1 capsule by mouth Daily.    Testosterone 20.25 MG/ACT (1.62%) gel Apply  topically to the appropriate area as directed Every Morning. Pt has a pump and squirts 3 every daily morning     Current Facility-Administered Medications on File Prior to Visit   Medication    Chlorhexidine Gluconate Cloth 2 % pads    lactated ringers infusion     PRN Meds:.    PMH:  Past Medical History:   Diagnosis Date    Arthritis     Colon polyp     Elevated cholesterol     Gallstone     GERD (gastroesophageal reflux disease)     Gout     History of kidney stones     History of pneumonia 2014    History of pulmonary embolism 2014    POST OP GALLBLADDER REMOVAL    Hypertension     Knee pain, bilateral     Prediabetes     Sepsis     with gallbladder    Sleep apnea     NOT USING CPAP MACHINE CURRENTLY  cpap        PSURGH:  Past Surgical History:   Procedure Laterality Date    CARPAL TUNNEL RELEASE Left 1978    CHOLECYSTECTOMY  02/2014    COLONOSCOPY      ENDOSCOPY N/A 09/13/2019    Procedure: ESOPHAGOGASTRODUODENOSCOPY;  Surgeon: Felton Bone MD;  [facility]: [facility];  Service: Gastroenterology    HAMMER TOE REPAIR Left 2000    HERNIA REPAIR      HIP BIPOLAR REPLACEMENT Right 10/2010    KNEE ARTHROPLASTY Right 2015    KNEE ARTHROSCOPY Right 10/2014    PILONIDAL CYSTECTOMY      THYROID SURGERY Left     partial    TOE FUSION Right 11/12/2019    Procedure: Right first toe phalangeal osteotomy.  Right second, third, fourth and fifth metatarsal phalangeal joint  "release with the second and third metatarsal osteotomy and right second third and fourth proximal interphalangeal joint resection and fusion.;  Surgeon: Nick Christine MD;  Location: [facility] OR [facility];  Service: Orthopedics    TOTAL KNEE ARTHROPLASTY Left 12/12/2022    Procedure: TOTAL KNEE ARTHROPLASTY;  Surgeon: Wilfred Serrato MD;  Location: [facility] OR [facility];  Service: Orthopedics;  Laterality: Left;       SocialHx:  Social History     Occupational History    Not on file   Tobacco Use    Smoking status: Never     Passive exposure: Never    Smokeless tobacco: Never   Vaping Use    Vaping status: Never Used   Substance and Sexual Activity    Alcohol use: Never    Drug use: Never    Sexual activity: Defer      Social History     Social History Narrative    Not on file       FamHx:  Family History   Problem Relation Age of Onset    Hypertension Other     Colon cancer Other     Colon cancer Brother     Colon polyps Brother     Malig Hyperthermia Neg Hx          Review of Systems:   A 14 point review of systems was performed, pertinent positives discussed above, all other systems are negative    Physical Exam: 68 y.o. male  Vital Signs :   Vitals:    04/25/24 1250   BP: 152/70   BP Location: Right arm   Patient Position: Sitting   Cuff Size: Large Adult   Temp: 98 °F (36.7 °C)   TempSrc: Temporal   Weight: (!) 145 kg (319 lb)   Height: 185.4 cm (72.99\")   PainSc:   2   PainLoc: Hip     General: Alert and Oriented x 3, No acute distress.  Psych: mood and affect appropriate; recent and remote memory intact  Eyes: conjunctivae clear; pupils equally round and reactive, sclerae antiicteric  CV: no peripheral edema  Resp: normal respiratory effort  Skin: no rashes or wounds; normal turgor  Musculosketetal; pain with hip range of motion. Positive Stinchfeld test. No trochanteric tenderness.  Vascular: palpable distal pulses    Labs:    Pre-Admission Testing on 04/18/2024   Component Date Value Ref Range Status    ABO Type " 04/18/2024 O   Final    RH type 04/18/2024 Positive   Final    Antibody Screen 04/18/2024 Negative   Final    T&S Expiration Date 04/18/2024 5/2/2024 11:59:00 PM   Final    Glucose 04/18/2024 106 (H)  65 - 99 mg/dL Final    BUN 04/18/2024 13  8 - 23 mg/dL Final    Creatinine 04/18/2024 0.90  0.76 - 1.27 mg/dL Final    Sodium 04/18/2024 141  136 - 145 mmol/L Final    Potassium 04/18/2024 4.3  3.5 - 5.2 mmol/L Final    Chloride 04/18/2024 108 (H)  98 - 107 mmol/L Final    CO2 04/18/2024 23.9  22.0 - 29.0 mmol/L Final    Calcium 04/18/2024 8.8  8.6 - 10.5 mg/dL Final    BUN/Creatinine Ratio 04/18/2024 14.4  7.0 - 25.0 Final    Anion Gap 04/18/2024 9.1  5.0 - 15.0 mmol/L Final    eGFR 04/18/2024 91.9  >60.0 mL/min/1.73 Final    WBC 04/18/2024 6.78  3.40 - 10.80 10*3/mm3 Final    RBC 04/18/2024 5.06  4.14 - 5.80 10*6/mm3 Final    Hemoglobin 04/18/2024 13.4  13.0 - 17.7 g/dL Final    Hematocrit 04/18/2024 42.0  37.5 - 51.0 % Final    MCV 04/18/2024 83.0  79.0 - 97.0 fL Final    MCH 04/18/2024 26.5 (L)  26.6 - 33.0 pg Final    MCHC 04/18/2024 31.9  31.5 - 35.7 g/dL Final    RDW 04/18/2024 13.3  12.3 - 15.4 % Final    RDW-SD 04/18/2024 40.1  37.0 - 54.0 fl Final    MPV 04/18/2024 9.3  6.0 - 12.0 fL Final    Platelets 04/18/2024 321  140 - 450 10*3/mm3 Final    QT Interval 04/18/2024 411  ms Final    QTC Interval 04/18/2024 386  ms Final     Xrays:  Xrays AP pelvis and a lateral of the Left hip were reviewed demonstrating  End stage hip OA with bone on bone articulation, subchondral cysts and periarticular osteophytes.    Assessment:  End-stage Left hip osteoarthritis. Conservative measures have failed.      Plan:  The plan is to proceed with Left Total Hip Replacement. The patient voiced understanding of the risks, benefits, and alternative forms of treatment that were discussed with Dr Serrato at the time of scheduling.  Same day home health, history of DVT, we will prescribe Xarelto postoperatively for 6 weeks    Joanne THOMPSON  Angel, ADRIEL  4/25/2024

## 2024-04-25 NOTE — TELEPHONE ENCOUNTER
Risk Factor yes no   Age >75  X   BMI <20 >40 X    Patient History     Chronic Pain (2 or more meds/Pain Management)  X   ETOH (more than 3 drinks Daily)  X   Uncontrolled Depression/Bipolar/Schizoaffective Disorder  X   Arrhythmias--Irreg HR, Tachycardia   X   Stent placement/MI  X   DVT/PE X    Pacemaker  X   HTN (uncontrolled or requiring more than 2 medications)  X   CHF/Retained fluids/Edema  X   Stroke with Residual   X   COPD/Asthma  X   ANNA--Non-compliant with CPAP  X   Diabetes (on insulin or more than 2 meds)         A1C:  X   BPH/Urinary retention (on medication)  X   CKD  X   Home environment and support     Current ambulation status (use of cane, walker, W/C, Multiple falls/weakness)  X   Stairs to enter and throughout home X    Lives Alone  X   Doesn't have support at home  X   Outpatient Screening Assessment    Home needs: (Walker/BSC):  Has walker  ? Steps 3 steps also has a ramp   Caregiver 24-48hrs post-discharge: wife    Discharge Plan:   Caretenders saw him with the last surgery     Prescriptions: Meds to bed    Home medications:   ? Blood thinner/anti-coag therapy--ASA   [] BPH or diuretic--  ? BP meds-- Norvasc, Coreg  [] Pain/Anti-inflammatories--Mobic,   DM--Metformin,   Pre-op Education:  Educate patient on spinal anesthesia/pain control:  ? patient verbalize understanding    Educate patient on hospital course/timeline:  ?  patient verbalize understanding    Joint Care Class:  ?  yes [] no  Notes:

## 2024-04-25 NOTE — H&P
Patient: John Paul Cisneros    Date of Admission: 5/1/2024    YOB: 1955    Medical Record Number: [id number]    Admitting Physician: Dr. Wilfred Serrato    Reason for Admission: End Stage Left Hip OA    History of Present Illness: 68 y.o. male presents with severe end stage hip osteoarthritis which has not been responsive to the full compliment of conservative measures. Despite conservative attempts, there is still severe, constant activity limiting hip pain. Given the severity of the pain, the patient has elected to proceed with hip replacement.    Allergies:   Allergies   Allergen Reactions    Oxycodone Other (See Comments)     ABDOMINAL Cramps    Propoxyphene Other (See Comments)     Floating in the clouds    Adhesive Tape Hives, Itching and Rash     EKG PATCHES    Indapamide Cough         Current Medications:  Home Medications:    Current Outpatient Medications on File Prior to Visit   Medication Sig    acetaminophen (TYLENOL) 500 MG tablet Take 2 tablets by mouth Every 6 (Six) Hours As Needed for Mild Pain.    acyclovir (ZOVIRAX) 200 MG capsule Take 1 capsule by mouth As Needed.    amLODIPine (NORVASC) 10 MG tablet Take 1 tablet by mouth Daily.    amoxicillin (AMOXIL) 500 MG capsule Take 4 capsules by mouth As Needed. Prior to dental visit    ASPIRIN 81 PO Take 81 mg by mouth Every Morning.    atorvastatin (LIPITOR) 80 MG tablet Take 1 tablet by mouth Daily.    carvedilol (COREG) 12.5 MG tablet Take 1 tablet by mouth 2 (Two) Times a Day With Meals.    cyanocobalamin (VITAMIN B-12) 1000 MCG tablet Take 1 tablet by mouth Daily.    febuxostat (ULORIC) 40 MG tablet Take 1 tablet by mouth Daily.    irbesartan (AVAPRO) 300 MG tablet Take 1 tablet by mouth Every Night.    meloxicam (MOBIC) 15 MG tablet Take 1 tablet by mouth Daily. To stop before surgery    metFORMIN ER (GLUCOPHAGE-XR) 500 MG 24 hr tablet Take 1 tablet by mouth 2 (Two) Times a Day.    omeprazole (priLOSEC) 40 MG capsule Take 1 capsule by  mouth Daily.    psyllium (METAMUCIL) 58.6 % packet Take 1 packet by mouth 2 (Two) Times a Day.    Sodium Fluoride (PreviDent 5000 Booster Plus) 1.1 % paste     sucralfate (CARAFATE) 1 g tablet Take 1 tablet by mouth 4 (Four) Times a Day. (Patient taking differently: Take 1 tablet by mouth 4 (Four) Times a Day As Needed.)    tadalafil (CIALIS) 5 MG tablet Take 1 tablet by mouth Daily As Needed for Erectile Dysfunction.    tamsulosin (FLOMAX) 0.4 MG capsule 24 hr capsule Take 1 capsule by mouth Daily.    Testosterone 20.25 MG/ACT (1.62%) gel Apply  topically to the appropriate area as directed Every Morning. Pt has a pump and squirts 3 every daily morning     Current Facility-Administered Medications on File Prior to Visit   Medication    Chlorhexidine Gluconate Cloth 2 % pads    lactated ringers infusion     PRN Meds:.    PMH:  Past Medical History:   Diagnosis Date    Arthritis     Colon polyp     Elevated cholesterol     Gallstone     GERD (gastroesophageal reflux disease)     Gout     History of kidney stones     History of pneumonia 2014    History of pulmonary embolism 2014    POST OP GALLBLADDER REMOVAL    Hypertension     Knee pain, bilateral     Prediabetes     Sepsis     with gallbladder    Sleep apnea     NOT USING CPAP MACHINE CURRENTLY  cpap        PSURGH:  Past Surgical History:   Procedure Laterality Date    CARPAL TUNNEL RELEASE Left 1978    CHOLECYSTECTOMY  02/2014    COLONOSCOPY      ENDOSCOPY N/A 09/13/2019    Procedure: ESOPHAGOGASTRODUODENOSCOPY;  Surgeon: Felton Bone MD;  Location: [facility];  Service: Gastroenterology    HAMMER TOE REPAIR Left 2000    HERNIA REPAIR      HIP BIPOLAR REPLACEMENT Right 10/2010    KNEE ARTHROPLASTY Right 2015    KNEE ARTHROSCOPY Right 10/2014    PILONIDAL CYSTECTOMY      THYROID SURGERY Left     partial    TOE FUSION Right 11/12/2019    Procedure: Right first toe phalangeal osteotomy.  Right second, third, fourth and fifth metatarsal phalangeal joint  "release with the second and third metatarsal osteotomy and right second third and fourth proximal interphalangeal joint resection and fusion.;  Surgeon: Nick Christine MD;  Location: [facility] OR [facility] [place];  Service: Orthopedics    TOTAL KNEE ARTHROPLASTY Left 12/12/2022    Procedure: TOTAL KNEE ARTHROPLASTY;  Surgeon: Wilfred Serrato MD;  Location: [facility] OR [facility] [place];  Service: Orthopedics;  Laterality: Left;       SocialHx:  Social History     Occupational History    Not on file   Tobacco Use    Smoking status: Never     Passive exposure: Never    Smokeless tobacco: Never   Vaping Use    Vaping status: Never Used   Substance and Sexual Activity    Alcohol use: Never    Drug use: Never    Sexual activity: Defer      Social History     Social History Narrative    Not on file       FamHx:  Family History   Problem Relation Age of Onset    Hypertension Other     Colon cancer Other     Colon cancer Brother     Colon polyps Brother     Malig Hyperthermia Neg Hx          Review of Systems:   A 14 point review of systems was performed, pertinent positives discussed above, all other systems are negative    Physical Exam: 68 y.o. male  Vital Signs :   Vitals:    04/25/24 1250   BP: 152/70   BP Location: Right arm   Patient Position: Sitting   Cuff Size: Large Adult   Temp: 98 °F (36.7 °C)   TempSrc: Temporal   Weight: (!) 145 kg (319 lb)   Height: 185.4 cm (72.99\")   PainSc:   2   PainLoc: Hip     General: Alert and Oriented x 3, No acute distress.  Psych: mood and affect appropriate; recent and remote memory intact  Eyes: conjunctivae clear; pupils equally round and reactive, sclerae antiicteric  CV: no peripheral edema  Resp: normal respiratory effort  Skin: no rashes or wounds; normal turgor  Musculosketetal; pain with hip range of motion. Positive Stinchfeld test. No trochanteric tenderness.  Vascular: palpable distal pulses    Labs:    Pre-Admission Testing on 04/18/2024   Component Date Value Ref Range Status    ABO Type " 04/18/2024 O   Final    RH type 04/18/2024 Positive   Final    Antibody Screen 04/18/2024 Negative   Final    T&S Expiration Date 04/18/2024 5/2/2024 11:59:00 PM   Final    Glucose 04/18/2024 106 (H)  65 - 99 mg/dL Final    BUN 04/18/2024 13  8 - 23 mg/dL Final    Creatinine 04/18/2024 0.90  0.76 - 1.27 mg/dL Final    Sodium 04/18/2024 141  136 - 145 mmol/L Final    Potassium 04/18/2024 4.3  3.5 - 5.2 mmol/L Final    Chloride 04/18/2024 108 (H)  98 - 107 mmol/L Final    CO2 04/18/2024 23.9  22.0 - 29.0 mmol/L Final    Calcium 04/18/2024 8.8  8.6 - 10.5 mg/dL Final    BUN/Creatinine Ratio 04/18/2024 14.4  7.0 - 25.0 Final    Anion Gap 04/18/2024 9.1  5.0 - 15.0 mmol/L Final    eGFR 04/18/2024 91.9  >60.0 mL/min/1.73 Final    WBC 04/18/2024 6.78  3.40 - 10.80 10*3/mm3 Final    RBC 04/18/2024 5.06  4.14 - 5.80 10*6/mm3 Final    Hemoglobin 04/18/2024 13.4  13.0 - 17.7 g/dL Final    Hematocrit 04/18/2024 42.0  37.5 - 51.0 % Final    MCV 04/18/2024 83.0  79.0 - 97.0 fL Final    MCH 04/18/2024 26.5 (L)  26.6 - 33.0 pg Final    MCHC 04/18/2024 31.9  31.5 - 35.7 g/dL Final    RDW 04/18/2024 13.3  12.3 - 15.4 % Final    RDW-SD 04/18/2024 40.1  37.0 - 54.0 fl Final    MPV 04/18/2024 9.3  6.0 - 12.0 fL Final    Platelets 04/18/2024 321  140 - 450 10*3/mm3 Final    QT Interval 04/18/2024 411  ms Final    QTC Interval 04/18/2024 386  ms Final     Xrays:  Xrays AP pelvis and a lateral of the Left hip were reviewed demonstrating  End stage hip OA with bone on bone articulation, subchondral cysts and periarticular osteophytes.    Assessment:  End-stage Left hip osteoarthritis. Conservative measures have failed.      Plan:  The plan is to proceed with Left Total Hip Replacement. The patient voiced understanding of the risks, benefits, and alternative forms of treatment that were discussed with Dr Serrato at the time of scheduling.  Same day home health, history of DVT, we will prescribe Xarelto postoperatively for 6 weeks    Joanne THOMPSON  Angel, ADRIEL  4/25/2024

## 2024-04-29 ENCOUNTER — TELEPHONE (OUTPATIENT)
Dept: ORTHOPEDIC SURGERY | Facility: CLINIC | Age: 69
End: 2024-04-29
Payer: MEDICARE

## 2024-05-01 ENCOUNTER — APPOINTMENT (OUTPATIENT)
Dept: GENERAL RADIOLOGY | Facility: HOSPITAL | Age: 69
End: 2024-05-01
Payer: MEDICARE

## 2024-05-01 ENCOUNTER — HOSPITAL ENCOUNTER (OUTPATIENT)
Facility: HOSPITAL | Age: 69
Discharge: HOME HEALTH | End: 2024-05-01
Attending: ORTHOPAEDIC SURGERY | Admitting: ORTHOPAEDIC SURGERY
Payer: MEDICARE

## 2024-05-01 ENCOUNTER — ANESTHESIA (OUTPATIENT)
Dept: PERIOP | Facility: HOSPITAL | Age: 69
End: 2024-05-01
Payer: MEDICARE

## 2024-05-01 ENCOUNTER — ANESTHESIA EVENT (OUTPATIENT)
Dept: PERIOP | Facility: HOSPITAL | Age: 69
End: 2024-05-01
Payer: MEDICARE

## 2024-05-01 VITALS
OXYGEN SATURATION: 95 % | DIASTOLIC BLOOD PRESSURE: 76 MMHG | RESPIRATION RATE: 16 BRPM | HEART RATE: 55 BPM | SYSTOLIC BLOOD PRESSURE: 144 MMHG | TEMPERATURE: 97.5 F

## 2024-05-01 DIAGNOSIS — Z96.642 STATUS POST TOTAL HIP REPLACEMENT, LEFT: Primary | ICD-10-CM

## 2024-05-01 DIAGNOSIS — M16.12 PRIMARY OSTEOARTHRITIS OF LEFT HIP: ICD-10-CM

## 2024-05-01 PROCEDURE — 97530 THERAPEUTIC ACTIVITIES: CPT

## 2024-05-01 PROCEDURE — 25010000002 KETOROLAC TROMETHAMINE PER 15 MG: Performed by: ORTHOPAEDIC SURGERY

## 2024-05-01 PROCEDURE — 25010000002 PROPOFOL 10 MG/ML EMULSION: Performed by: NURSE ANESTHETIST, CERTIFIED REGISTERED

## 2024-05-01 PROCEDURE — 97161 PT EVAL LOW COMPLEX 20 MIN: CPT

## 2024-05-01 PROCEDURE — 25010000002 CEFAZOLIN PER 500 MG: Performed by: ORTHOPAEDIC SURGERY

## 2024-05-01 PROCEDURE — 27130 TOTAL HIP ARTHROPLASTY: CPT | Performed by: ORTHOPAEDIC SURGERY

## 2024-05-01 PROCEDURE — 27130 TOTAL HIP ARTHROPLASTY: CPT | Performed by: NURSE PRACTITIONER

## 2024-05-01 PROCEDURE — 25010000002 ONDANSETRON PER 1 MG: Performed by: NURSE ANESTHETIST, CERTIFIED REGISTERED

## 2024-05-01 PROCEDURE — 25010000002 DEXAMETHASONE SODIUM PHOSPHATE 20 MG/5ML SOLUTION: Performed by: NURSE ANESTHETIST, CERTIFIED REGISTERED

## 2024-05-01 PROCEDURE — 25810000003 SODIUM CHLORIDE 0.9 % SOLUTION: Performed by: ORTHOPAEDIC SURGERY

## 2024-05-01 PROCEDURE — 25010000002 HYDROMORPHONE PER 4 MG: Performed by: NURSE ANESTHETIST, CERTIFIED REGISTERED

## 2024-05-01 PROCEDURE — 25010000002 ROPIVACAINE PER 1 MG: Performed by: ORTHOPAEDIC SURGERY

## 2024-05-01 PROCEDURE — 25010000002 VANCOMYCIN 10 G RECONSTITUTED SOLUTION: Performed by: ORTHOPAEDIC SURGERY

## 2024-05-01 PROCEDURE — 25010000002 MORPHINE PER 10 MG: Performed by: ORTHOPAEDIC SURGERY

## 2024-05-01 PROCEDURE — 25010000002 ACETAMINOPHEN 10 MG/ML SOLUTION: Performed by: NURSE ANESTHETIST, CERTIFIED REGISTERED

## 2024-05-01 PROCEDURE — C1776 JOINT DEVICE (IMPLANTABLE): HCPCS | Performed by: ORTHOPAEDIC SURGERY

## 2024-05-01 PROCEDURE — G0378 HOSPITAL OBSERVATION PER HR: HCPCS

## 2024-05-01 PROCEDURE — 25010000002 EPINEPHRINE 1 MG/ML SOLUTION 30 ML VIAL: Performed by: ORTHOPAEDIC SURGERY

## 2024-05-01 PROCEDURE — 73501 X-RAY EXAM HIP UNI 1 VIEW: CPT

## 2024-05-01 PROCEDURE — 25810000003 LACTATED RINGERS SOLUTION: Performed by: ORTHOPAEDIC SURGERY

## 2024-05-01 PROCEDURE — 25010000002 PROPOFOL 200 MG/20ML EMULSION: Performed by: NURSE ANESTHETIST, CERTIFIED REGISTERED

## 2024-05-01 PROCEDURE — 25010000002 SUGAMMADEX 200 MG/2ML SOLUTION: Performed by: NURSE ANESTHETIST, CERTIFIED REGISTERED

## 2024-05-01 PROCEDURE — 25010000002 FENTANYL CITRATE (PF) 100 MCG/2ML SOLUTION: Performed by: NURSE ANESTHETIST, CERTIFIED REGISTERED

## 2024-05-01 DEVICE — IMPLANTABLE DEVICE: Type: IMPLANTABLE DEVICE | Status: FUNCTIONAL

## 2024-05-01 DEVICE — R3 3 HOLE ACETABULAR SHELL 58MM
Type: IMPLANTABLE DEVICE | Site: HIP | Status: FUNCTIONAL
Brand: R3 ACETABULAR

## 2024-05-01 DEVICE — R3 20 DEGREE XLPE ACETABULAR LINER                                    36MM INNER DIAMETER X OUTER DIAMETER 58MM
Type: IMPLANTABLE DEVICE | Site: HIP | Status: FUNCTIONAL
Brand: R3

## 2024-05-01 DEVICE — REFLECTION SPHERICAL HEAD SCREW 30MM
Type: IMPLANTABLE DEVICE | Site: HIP | Status: FUNCTIONAL
Brand: REFLECTION

## 2024-05-01 DEVICE — VIOLET ANTIBACTERIAL POLYDIOXANONE, KNOTLESS TISSUE CONTROL DEVICE
Type: IMPLANTABLE DEVICE | Site: HIP | Status: FUNCTIONAL
Brand: STRATAFIX

## 2024-05-01 DEVICE — POLARSTEM COLLAR STANDARD                                    NON-CEMENTED WITH TI/HA 6
Type: IMPLANTABLE DEVICE | Site: HIP | Status: FUNCTIONAL
Brand: POLARSTEM

## 2024-05-01 DEVICE — OXINIUM FEMORAL HEAD 12/14 TAPER                                    36 MM M/+4
Type: IMPLANTABLE DEVICE | Site: HIP | Status: FUNCTIONAL
Brand: OXINIUM

## 2024-05-01 DEVICE — KNOTLESS TISSUE CONTROL DEVICE, UNDYED UNIDIRECTIONAL (ANTIBACTERIAL) SYNTHETIC ABSORBABLE DEVICE
Type: IMPLANTABLE DEVICE | Site: HIP | Status: FUNCTIONAL
Brand: STRATAFIX

## 2024-05-01 RX ORDER — NALOXONE HCL 0.4 MG/ML
0.2 VIAL (ML) INJECTION AS NEEDED
Status: DISCONTINUED | OUTPATIENT
Start: 2024-05-01 | End: 2024-05-01 | Stop reason: HOSPADM

## 2024-05-01 RX ORDER — DROPERIDOL 2.5 MG/ML
0.62 INJECTION, SOLUTION INTRAMUSCULAR; INTRAVENOUS
Status: DISCONTINUED | OUTPATIENT
Start: 2024-05-01 | End: 2024-05-01 | Stop reason: HOSPADM

## 2024-05-01 RX ORDER — FAMOTIDINE 10 MG/ML
20 INJECTION, SOLUTION INTRAVENOUS ONCE
Status: COMPLETED | OUTPATIENT
Start: 2024-05-01 | End: 2024-05-01

## 2024-05-01 RX ORDER — FENTANYL CITRATE 50 UG/ML
INJECTION, SOLUTION INTRAMUSCULAR; INTRAVENOUS AS NEEDED
Status: DISCONTINUED | OUTPATIENT
Start: 2024-05-01 | End: 2024-05-01 | Stop reason: SURG

## 2024-05-01 RX ORDER — MAGNESIUM HYDROXIDE 1200 MG/15ML
LIQUID ORAL AS NEEDED
Status: DISCONTINUED | OUTPATIENT
Start: 2024-05-01 | End: 2024-05-01 | Stop reason: HOSPADM

## 2024-05-01 RX ORDER — CARVEDILOL 12.5 MG/1
12.5 TABLET ORAL 2 TIMES DAILY WITH MEALS
Status: CANCELLED | OUTPATIENT
Start: 2024-05-01

## 2024-05-01 RX ORDER — DEXAMETHASONE SODIUM PHOSPHATE 4 MG/ML
INJECTION, SOLUTION INTRA-ARTICULAR; INTRALESIONAL; INTRAMUSCULAR; INTRAVENOUS; SOFT TISSUE AS NEEDED
Status: DISCONTINUED | OUTPATIENT
Start: 2024-05-01 | End: 2024-05-01 | Stop reason: SURG

## 2024-05-01 RX ORDER — UREA 10 %
1 LOTION (ML) TOPICAL NIGHTLY PRN
Status: CANCELLED | OUTPATIENT
Start: 2024-05-01

## 2024-05-01 RX ORDER — PANTOPRAZOLE SODIUM 40 MG/1
40 TABLET, DELAYED RELEASE ORAL
Status: CANCELLED | OUTPATIENT
Start: 2024-05-01

## 2024-05-01 RX ORDER — IPRATROPIUM BROMIDE AND ALBUTEROL SULFATE 2.5; .5 MG/3ML; MG/3ML
3 SOLUTION RESPIRATORY (INHALATION) ONCE AS NEEDED
Status: DISCONTINUED | OUTPATIENT
Start: 2024-05-01 | End: 2024-05-01 | Stop reason: HOSPADM

## 2024-05-01 RX ORDER — PROMETHAZINE HYDROCHLORIDE 25 MG/1
25 SUPPOSITORY RECTAL ONCE AS NEEDED
Status: DISCONTINUED | OUTPATIENT
Start: 2024-05-01 | End: 2024-05-01 | Stop reason: HOSPADM

## 2024-05-01 RX ORDER — ONDANSETRON 4 MG/1
4 TABLET, FILM COATED ORAL EVERY 8 HOURS PRN
Qty: 10 TABLET | Refills: 0 | Status: SHIPPED | OUTPATIENT
Start: 2024-05-01

## 2024-05-01 RX ORDER — HYDROCODONE BITARTRATE AND ACETAMINOPHEN 7.5; 325 MG/1; MG/1
1 TABLET ORAL EVERY 4 HOURS PRN
Status: CANCELLED | OUTPATIENT
Start: 2024-05-01 | End: 2024-05-08

## 2024-05-01 RX ORDER — ROCURONIUM BROMIDE 10 MG/ML
INJECTION, SOLUTION INTRAVENOUS AS NEEDED
Status: DISCONTINUED | OUTPATIENT
Start: 2024-05-01 | End: 2024-05-01 | Stop reason: SURG

## 2024-05-01 RX ORDER — PROMETHAZINE HYDROCHLORIDE 25 MG/1
12.5 TABLET ORAL EVERY 4 HOURS PRN
Status: DISCONTINUED | OUTPATIENT
Start: 2024-05-01 | End: 2024-05-01 | Stop reason: HOSPADM

## 2024-05-01 RX ORDER — ACETAMINOPHEN 325 MG/1
650 TABLET ORAL EVERY 6 HOURS PRN
Status: DISCONTINUED | OUTPATIENT
Start: 2024-05-01 | End: 2024-05-01 | Stop reason: HOSPADM

## 2024-05-01 RX ORDER — FEBUXOSTAT 40 MG/1
40 TABLET, FILM COATED ORAL DAILY
Status: CANCELLED | OUTPATIENT
Start: 2024-05-01

## 2024-05-01 RX ORDER — HYDROCODONE BITARTRATE AND ACETAMINOPHEN 7.5; 325 MG/1; MG/1
2 TABLET ORAL EVERY 4 HOURS PRN
Status: DISCONTINUED | OUTPATIENT
Start: 2024-05-01 | End: 2024-05-01 | Stop reason: HOSPADM

## 2024-05-01 RX ORDER — VANCOMYCIN 2 GRAM/500 ML IN 0.9 % SODIUM CHLORIDE INTRAVENOUS
15 ONCE
Status: COMPLETED | OUTPATIENT
Start: 2024-05-01 | End: 2024-05-01

## 2024-05-01 RX ORDER — METFORMIN HYDROCHLORIDE 500 MG/1
500 TABLET, EXTENDED RELEASE ORAL 2 TIMES DAILY
Status: CANCELLED | OUTPATIENT
Start: 2024-05-01

## 2024-05-01 RX ORDER — EPHEDRINE SULFATE 50 MG/ML
5 INJECTION, SOLUTION INTRAVENOUS ONCE AS NEEDED
Status: DISCONTINUED | OUTPATIENT
Start: 2024-05-01 | End: 2024-05-01 | Stop reason: HOSPADM

## 2024-05-01 RX ORDER — MIDAZOLAM HYDROCHLORIDE 1 MG/ML
0.5 INJECTION INTRAMUSCULAR; INTRAVENOUS
Status: DISCONTINUED | OUTPATIENT
Start: 2024-05-01 | End: 2024-05-01 | Stop reason: HOSPADM

## 2024-05-01 RX ORDER — AMLODIPINE BESYLATE 10 MG/1
10 TABLET ORAL DAILY
Status: CANCELLED | OUTPATIENT
Start: 2024-05-01

## 2024-05-01 RX ORDER — HYDROCODONE BITARTRATE AND ACETAMINOPHEN 7.5; 325 MG/1; MG/1
1 TABLET ORAL EVERY 4 HOURS PRN
Qty: 40 TABLET | Refills: 0 | Status: SHIPPED | OUTPATIENT
Start: 2024-05-01 | End: 2024-05-06 | Stop reason: SDUPTHER

## 2024-05-01 RX ORDER — MELOXICAM 15 MG/1
15 TABLET ORAL ONCE
Status: COMPLETED | OUTPATIENT
Start: 2024-05-01 | End: 2024-05-01

## 2024-05-01 RX ORDER — TAMSULOSIN HYDROCHLORIDE 0.4 MG/1
0.4 CAPSULE ORAL DAILY
Status: CANCELLED | OUTPATIENT
Start: 2024-05-01

## 2024-05-01 RX ORDER — ONDANSETRON 2 MG/ML
INJECTION INTRAMUSCULAR; INTRAVENOUS AS NEEDED
Status: DISCONTINUED | OUTPATIENT
Start: 2024-05-01 | End: 2024-05-01 | Stop reason: SURG

## 2024-05-01 RX ORDER — HYDROCODONE BITARTRATE AND ACETAMINOPHEN 7.5; 325 MG/1; MG/1
1 TABLET ORAL EVERY 4 HOURS PRN
Status: DISCONTINUED | OUTPATIENT
Start: 2024-05-01 | End: 2024-05-01 | Stop reason: HOSPADM

## 2024-05-01 RX ORDER — ONDANSETRON 2 MG/ML
4 INJECTION INTRAMUSCULAR; INTRAVENOUS ONCE AS NEEDED
Status: DISCONTINUED | OUTPATIENT
Start: 2024-05-01 | End: 2024-05-01 | Stop reason: HOSPADM

## 2024-05-01 RX ORDER — LABETALOL HYDROCHLORIDE 5 MG/ML
5 INJECTION, SOLUTION INTRAVENOUS
Status: DISCONTINUED | OUTPATIENT
Start: 2024-05-01 | End: 2024-05-01 | Stop reason: HOSPADM

## 2024-05-01 RX ORDER — LOSARTAN POTASSIUM 100 MG/1
100 TABLET ORAL
Status: CANCELLED | OUTPATIENT
Start: 2024-05-01

## 2024-05-01 RX ORDER — PROMETHAZINE HYDROCHLORIDE 25 MG/1
25 TABLET ORAL ONCE AS NEEDED
Status: DISCONTINUED | OUTPATIENT
Start: 2024-05-01 | End: 2024-05-01 | Stop reason: HOSPADM

## 2024-05-01 RX ORDER — FENTANYL CITRATE 50 UG/ML
50 INJECTION, SOLUTION INTRAMUSCULAR; INTRAVENOUS
Status: DISCONTINUED | OUTPATIENT
Start: 2024-05-01 | End: 2024-05-01 | Stop reason: HOSPADM

## 2024-05-01 RX ORDER — FLUMAZENIL 0.1 MG/ML
0.2 INJECTION INTRAVENOUS AS NEEDED
Status: DISCONTINUED | OUTPATIENT
Start: 2024-05-01 | End: 2024-05-01 | Stop reason: HOSPADM

## 2024-05-01 RX ORDER — FENTANYL CITRATE 50 UG/ML
50 INJECTION, SOLUTION INTRAMUSCULAR; INTRAVENOUS ONCE AS NEEDED
Status: DISCONTINUED | OUTPATIENT
Start: 2024-05-01 | End: 2024-05-01 | Stop reason: HOSPADM

## 2024-05-01 RX ORDER — LIDOCAINE HYDROCHLORIDE 10 MG/ML
0.5 INJECTION, SOLUTION INFILTRATION; PERINEURAL ONCE AS NEEDED
Status: DISCONTINUED | OUTPATIENT
Start: 2024-05-01 | End: 2024-05-01 | Stop reason: HOSPADM

## 2024-05-01 RX ORDER — EPHEDRINE SULFATE 50 MG/ML
INJECTION INTRAVENOUS AS NEEDED
Status: DISCONTINUED | OUTPATIENT
Start: 2024-05-01 | End: 2024-05-01 | Stop reason: SURG

## 2024-05-01 RX ORDER — LIDOCAINE HYDROCHLORIDE 20 MG/ML
INJECTION, SOLUTION EPIDURAL; INFILTRATION; INTRACAUDAL; PERINEURAL AS NEEDED
Status: DISCONTINUED | OUTPATIENT
Start: 2024-05-01 | End: 2024-05-01 | Stop reason: SURG

## 2024-05-01 RX ORDER — HYDROCODONE BITARTRATE AND ACETAMINOPHEN 7.5; 325 MG/1; MG/1
2 TABLET ORAL EVERY 4 HOURS PRN
Status: CANCELLED | OUTPATIENT
Start: 2024-05-01 | End: 2024-05-08

## 2024-05-01 RX ORDER — HYDRALAZINE HYDROCHLORIDE 20 MG/ML
5 INJECTION INTRAMUSCULAR; INTRAVENOUS
Status: DISCONTINUED | OUTPATIENT
Start: 2024-05-01 | End: 2024-05-01 | Stop reason: HOSPADM

## 2024-05-01 RX ORDER — DIPHENHYDRAMINE HYDROCHLORIDE 50 MG/ML
12.5 INJECTION INTRAMUSCULAR; INTRAVENOUS
Status: DISCONTINUED | OUTPATIENT
Start: 2024-05-01 | End: 2024-05-01 | Stop reason: HOSPADM

## 2024-05-01 RX ORDER — HYDROMORPHONE HYDROCHLORIDE 1 MG/ML
0.5 INJECTION, SOLUTION INTRAMUSCULAR; INTRAVENOUS; SUBCUTANEOUS
Status: DISCONTINUED | OUTPATIENT
Start: 2024-05-01 | End: 2024-05-01 | Stop reason: HOSPADM

## 2024-05-01 RX ORDER — PREGABALIN 75 MG/1
150 CAPSULE ORAL ONCE
Status: COMPLETED | OUTPATIENT
Start: 2024-05-01 | End: 2024-05-01

## 2024-05-01 RX ORDER — ACETAMINOPHEN 10 MG/ML
INJECTION, SOLUTION INTRAVENOUS AS NEEDED
Status: DISCONTINUED | OUTPATIENT
Start: 2024-05-01 | End: 2024-05-01 | Stop reason: SURG

## 2024-05-01 RX ORDER — SODIUM CHLORIDE 0.9 % (FLUSH) 0.9 %
3-10 SYRINGE (ML) INJECTION AS NEEDED
Status: DISCONTINUED | OUTPATIENT
Start: 2024-05-01 | End: 2024-05-01 | Stop reason: HOSPADM

## 2024-05-01 RX ORDER — ONDANSETRON 4 MG/1
4 TABLET, ORALLY DISINTEGRATING ORAL EVERY 6 HOURS PRN
Status: DISCONTINUED | OUTPATIENT
Start: 2024-05-01 | End: 2024-05-01 | Stop reason: HOSPADM

## 2024-05-01 RX ORDER — PROPOFOL 10 MG/ML
INJECTION, EMULSION INTRAVENOUS AS NEEDED
Status: DISCONTINUED | OUTPATIENT
Start: 2024-05-01 | End: 2024-05-01 | Stop reason: SURG

## 2024-05-01 RX ORDER — HYDROCODONE BITARTRATE AND ACETAMINOPHEN 5; 325 MG/1; MG/1
1 TABLET ORAL ONCE AS NEEDED
Status: DISCONTINUED | OUTPATIENT
Start: 2024-05-01 | End: 2024-05-01 | Stop reason: HOSPADM

## 2024-05-01 RX ORDER — ATORVASTATIN CALCIUM 80 MG/1
80 TABLET, FILM COATED ORAL DAILY
Status: CANCELLED | OUTPATIENT
Start: 2024-05-01

## 2024-05-01 RX ORDER — SODIUM CHLORIDE 0.9 % (FLUSH) 0.9 %
3 SYRINGE (ML) INJECTION EVERY 12 HOURS SCHEDULED
Status: DISCONTINUED | OUTPATIENT
Start: 2024-05-01 | End: 2024-05-01 | Stop reason: HOSPADM

## 2024-05-01 RX ORDER — SODIUM CHLORIDE, SODIUM LACTATE, POTASSIUM CHLORIDE, CALCIUM CHLORIDE 600; 310; 30; 20 MG/100ML; MG/100ML; MG/100ML; MG/100ML
9 INJECTION, SOLUTION INTRAVENOUS CONTINUOUS
Status: DISCONTINUED | OUTPATIENT
Start: 2024-05-01 | End: 2024-05-01 | Stop reason: HOSPADM

## 2024-05-01 RX ORDER — POLYETHYLENE GLYCOL 3350 17 G/17G
17 POWDER, FOR SOLUTION ORAL 2 TIMES DAILY
Qty: 238 G | Refills: 0 | Status: SHIPPED | OUTPATIENT
Start: 2024-05-01 | End: 2024-05-08

## 2024-05-01 RX ADMIN — PREGABALIN 150 MG: 75 CAPSULE ORAL at 06:18

## 2024-05-01 RX ADMIN — FAMOTIDINE 20 MG: 10 INJECTION INTRAVENOUS at 06:45

## 2024-05-01 RX ADMIN — PROPOFOL 30 MG: 10 INJECTION, EMULSION INTRAVENOUS at 08:45

## 2024-05-01 RX ADMIN — ROCURONIUM BROMIDE 15 MG: 10 INJECTION, SOLUTION INTRAVENOUS at 09:12

## 2024-05-01 RX ADMIN — ONDANSETRON 4 MG: 2 INJECTION INTRAMUSCULAR; INTRAVENOUS at 09:32

## 2024-05-01 RX ADMIN — HYDROMORPHONE HYDROCHLORIDE 0.5 MG: 1 INJECTION, SOLUTION INTRAMUSCULAR; INTRAVENOUS; SUBCUTANEOUS at 10:51

## 2024-05-01 RX ADMIN — EPHEDRINE SULFATE 10 MG: 50 INJECTION INTRAVENOUS at 09:50

## 2024-05-01 RX ADMIN — LIDOCAINE HYDROCHLORIDE 100 MG: 20 INJECTION, SOLUTION EPIDURAL; INFILTRATION; INTRACAUDAL; PERINEURAL at 08:08

## 2024-05-01 RX ADMIN — ROCURONIUM BROMIDE 50 MG: 10 INJECTION, SOLUTION INTRAVENOUS at 08:08

## 2024-05-01 RX ADMIN — EPHEDRINE SULFATE 5 MG: 50 INJECTION INTRAVENOUS at 09:16

## 2024-05-01 RX ADMIN — VANCOMYCIN HYDROCHLORIDE 2000 MG: 10 INJECTION, POWDER, LYOPHILIZED, FOR SOLUTION INTRAVENOUS at 06:58

## 2024-05-01 RX ADMIN — FENTANYL CITRATE 50 MCG: 50 INJECTION, SOLUTION INTRAMUSCULAR; INTRAVENOUS at 08:08

## 2024-05-01 RX ADMIN — PROPOFOL 200 MG: 10 INJECTION, EMULSION INTRAVENOUS at 08:08

## 2024-05-01 RX ADMIN — DEXAMETHASONE SODIUM PHOSPHATE 8 MG: 4 INJECTION, SOLUTION INTRAMUSCULAR; INTRAVENOUS at 08:08

## 2024-05-01 RX ADMIN — EPHEDRINE SULFATE 10 MG: 50 INJECTION INTRAVENOUS at 09:27

## 2024-05-01 RX ADMIN — FENTANYL CITRATE 25 MCG: 50 INJECTION, SOLUTION INTRAMUSCULAR; INTRAVENOUS at 09:48

## 2024-05-01 RX ADMIN — MELOXICAM 15 MG: 15 TABLET ORAL at 06:18

## 2024-05-01 RX ADMIN — FENTANYL CITRATE 25 MCG: 50 INJECTION, SOLUTION INTRAMUSCULAR; INTRAVENOUS at 09:45

## 2024-05-01 RX ADMIN — HYDROMORPHONE HYDROCHLORIDE 0.5 MG: 1 INJECTION, SOLUTION INTRAMUSCULAR; INTRAVENOUS; SUBCUTANEOUS at 10:33

## 2024-05-01 RX ADMIN — PROPOFOL 130 MCG/KG/MIN: 10 INJECTION, EMULSION INTRAVENOUS at 08:08

## 2024-05-01 RX ADMIN — HYDROCODONE BITARTRATE AND ACETAMINOPHEN 1 TABLET: 7.5; 325 TABLET ORAL at 10:33

## 2024-05-01 RX ADMIN — SUGAMMADEX 300 MG: 100 INJECTION, SOLUTION INTRAVENOUS at 09:41

## 2024-05-01 RX ADMIN — ACETAMINOPHEN 1000 MG: 1000 INJECTION INTRAVENOUS at 08:13

## 2024-05-01 RX ADMIN — EPHEDRINE SULFATE 5 MG: 50 INJECTION INTRAVENOUS at 09:21

## 2024-05-01 RX ADMIN — SODIUM CHLORIDE, POTASSIUM CHLORIDE, SODIUM LACTATE AND CALCIUM CHLORIDE 500 ML: 600; 310; 30; 20 INJECTION, SOLUTION INTRAVENOUS at 06:45

## 2024-05-01 RX ADMIN — SODIUM CHLORIDE 2 G: 900 INJECTION INTRAVENOUS at 07:57

## 2024-05-01 NOTE — PLAN OF CARE
Goal Outcome Evaluation:  Plan of Care Reviewed With: patient, spouse           Outcome Evaluation: Pt is a 67 y/o M POD 0 s/p L VERO. Pt reports he lives with his spous with a ramp to enter and is (I) with mobility at baseline. Pt presents to PT with expected post-op weakness. Pt stood and ambulated 100' c RW requiring SBA. Pt demo's a slow pace but progressed to a step-through pattern without a LOB. PT reviewed hip precautions and pt completed VERO protocol. Pt is safe to D/C home today with assist and f/u with HHPT.      Anticipated Discharge Disposition (PT): home with assist, home with home health

## 2024-05-01 NOTE — THERAPY EVALUATION
Patient Name: John Paul Cisneros  : 1955    MRN: [id number]                              Today's Date: 2024       Admit Date: 2024    Visit Dx:     ICD-10-CM ICD-9-CM   1. Status post total hip replacement, left  Z96.642 V43.64   2. Primary osteoarthritis of left hip  M16.12 715.15     Patient Active Problem List   Diagnosis    ANNA on auto CPAP    Hypersomnia due to another medical condition    Class 3 severe obesity due to excess calories with serious comorbidity and body mass index (BMI) of 40.0 to 44.9 in adult    Circadian rhythm sleep disorder, advanced sleep phase type    Circadian rhythm sleep disorder, shift work type    Other chest pain    Hallux valgus of right foot    Metatarsalgia of right foot    Hammer toe of right foot    Cellulitis of toe of right foot    Closed nondisplaced fracture of proximal phalanx of right great toe    Arthritis of foot    Primary osteoarthritis of left knee    S/P knee surgery    OA (osteoarthritis) of hip     Past Medical History:   Diagnosis Date    Arthritis     Colon polyp     Elevated cholesterol     Gallstone     GERD (gastroesophageal reflux disease)     Gout     History of kidney stones     History of pneumonia     History of pulmonary embolism 2014    POST OP GALLBLADDER REMOVAL    Hypertension     Knee pain, bilateral     Prediabetes     Sepsis     with gallbladder    Sleep apnea     NOT USING CPAP MACHINE CURRENTLY  cpap     Past Surgical History:   Procedure Laterality Date    CARPAL TUNNEL RELEASE Left     CHOLECYSTECTOMY  2014    COLONOSCOPY      ENDOSCOPY N/A 2019    Procedure: ESOPHAGOGASTRODUODENOSCOPY;  Surgeon: Felton Bone MD;  Location: [facility];  Service: Gastroenterology    HAMMER TOE REPAIR Left 2000    HERNIA REPAIR      HIP BIPOLAR REPLACEMENT Right 10/2010    KNEE ARTHROPLASTY Right 2015    KNEE ARTHROSCOPY Right 10/2014    PILONIDAL CYSTECTOMY      THYROID SURGERY Left     partial    TOE FUSION Right  11/12/2019    Procedure: Right first toe phalangeal osteotomy.  Right second, third, fourth and fifth metatarsal phalangeal joint release with the second and third metatarsal osteotomy and right second third and fourth proximal interphalangeal joint resection and fusion.;  Surgeon: Nick Christine MD;  Location: [facility] [place] [facility] [place];  Service: Orthopedics    TOTAL KNEE ARTHROPLASTY Left 12/12/2022    Procedure: TOTAL KNEE ARTHROPLASTY;  Surgeon: Wilfred Serrato MD;  Location: [facility] [place] [facility] [place];  Service: Orthopedics;  Laterality: Left;      General Information       Row Name 05/01/24 1155          Physical Therapy Time and Intention    Document Type discharge evaluation/summary  -CS     Mode of Treatment individual therapy;physical therapy  -CS       Row Name 05/01/24 1155          General Information    Patient Profile Reviewed yes  -CS     Prior Level of Function independent:;gait;transfer;bed mobility  -CS     Existing Precautions/Restrictions fall;hip, posterior  -CS     Barriers to Rehab none identified  -CS       Row Name 05/01/24 1155          Living Environment    People in Home spouse  -CS       Row Name 05/01/24 1155          Home Main Entrance    Number of Stairs, Main Entrance none;other (see comments)  ramp  -CS       Row Name 05/01/24 1155          Stairs Within Home, Primary    Number of Stairs, Within Home, Primary none  -CS       Row Name 05/01/24 1155          Cognition    Orientation Status (Cognition) oriented x 4  -CS       Row Name 05/01/24 1155          Safety Issues, Functional Mobility    Impairments Affecting Function (Mobility) pain;strength  -CS               User Key  (r) = Recorded By, (t) = Taken By, (c) = Cosigned By      Initials Name Provider Type    CS Shelbi Mayo PT Physical Therapist                   Mobility       Row Name 05/01/24 1155          Bed Mobility    Comment, (Bed Mobility) UIC  -CS       Row Name 05/01/24 1155          Sit-Stand Transfer    Sit-Stand [place]  (Transfers) standby assist  -     Assistive Device (Sit-Stand Transfers) walker, front-wheeled  -       Row Name 05/01/24 1155          Gait/Stairs (Locomotion)    [place] Level (Gait) standby assist  -CS     Assistive Device (Gait) walker, front-wheeled  -     Distance in Feet (Gait) 100  -CS     Deviations/Abnormal Patterns (Gait) antalgic;nuria decreased  -     Left Sided Gait Deviations weight shift ability decreased  -CS     [place] Level (Stairs) not tested  -CS     Comment, (Gait/Stairs) slow pace, no LOB  -       Row Name 05/01/24 1155          Mobility    Extremity Weight-bearing Status left lower extremity  -CS     Left Lower Extremity (Weight-bearing Status) weight-bearing as tolerated (WBAT)  -               User Key  (r) = Recorded By, (t) = Taken By, (c) = Cosigned By      Initials Name Provider Type    CS Shelbi Mayo, PT Physical Therapist                   Obj/Interventions       Row Name 05/01/24 1155          Range of Motion Comprehensive    General Range of Motion other (see comments)  -CS     Comment, General Range of Motion L LE limited secondary to post-op; R LE WFL  -       Row Name 05/01/24 1155          Strength Comprehensive (MMT)    General Manual Muscle Testing (MMT) Assessment other (see comments)  -CS     Comment, General Manual Muscle Testing (MMT) Assessment L LE limited secondary to post-op; R LE WFL  -       Row Name 05/01/24 1155          Motor Skills    Therapeutic Exercise other (see comments)  10 reps L VERO protocol  -       Row Name 05/01/24 1155          Balance    Comment, Balance WFL  -               User Key  (r) = Recorded By, (t) = Taken By, (c) = Cosigned By      Initials Name Provider Type    CS Shelbi Mayo, PT Physical Therapist                   Goals/Plan    No documentation.                  Clinical Impression       Row Name 05/01/24 1156          Pain    Pretreatment Pain Rating 0/10 - no pain  -CS     Posttreatment Pain  Rating 0/10 - no pain  -CS       Row Name 05/01/24 1156          Plan of Care Review    Plan of Care Reviewed With patient;spouse  -CS     Outcome Evaluation Pt is a 67 y/o M POD 0 s/p L VERO. Pt reports he lives with his spous with a ramp to enter and is (I) with mobility at baseline. Pt presents to PT with expected post-op weakness. Pt stood and ambulated 100' c RW requiring SBA. Pt demo's a slow pace but progressed to a step-through pattern without a LOB. PT reviewed hip precautions and pt completed VERO protocol. Pt is safe to D/C home today with assist and f/u with HHPT.  -CS       Row Name 05/01/24 1156          Therapy Assessment/Plan (PT)    Criteria for Skilled Interventions Met (PT) yes;meets criteria  -CS     Therapy Frequency (PT) evaluation only  -CS       Row Name 05/01/24 1157          Positioning and Restraints    Pre-Treatment Position sitting in chair/recliner  -CS     Post Treatment Position chair  -CS     In Chair notified nsg;reclined;call light within reach;encouraged to call for assist;with family/caregiver  -CS               User Key  (r) = Recorded By, (t) = Taken By, (c) = Cosigned By      Initials Name Provider Type    CS Shelbi Mayo, PT Physical Therapist                   Outcome Measures       Row Name 05/01/24 2969          How much help from another person do you currently need...    Turning from your back to your side while in flat bed without using bedrails? 4  -CS     Moving from lying on back to sitting on the side of a flat bed without bedrails? 4  -CS     Moving to and from a bed to a chair (including a wheelchair)? 4  -CS     Standing up from a chair using your arms (e.g., wheelchair, bedside chair)? 4  -CS     Climbing 3-5 steps with a railing? 3  -CS     To walk in hospital room? 4  -CS     AM-PAC 6 Clicks Score (PT) 23  -CS     Highest Level of Mobility Goal 7 --> Walk 25 feet or more  -CS       Row Name 05/01/24 3566          Functional Assessment    Outcome Measure  Options AM-PAC 6 Clicks Basic Mobility (PT)  -               User Key  (r) = Recorded By, (t) = Taken By, (c) = Cosigned By      Initials Name Provider Type    CS Shelbi Mayo PT Physical Therapist                                 Physical Therapy Education       Title: PT OT SLP Therapies (Done)       Topic: Physical Therapy (Done)       Point: Mobility training (Done)       Learning Progress Summary             Patient Acceptance, E,TB, VU,DU by  at 5/1/2024 1158   Family Acceptance, E,TB, VU,DU by  at 5/1/2024 1158                         Point: Home exercise program (Done)       Learning Progress Summary             Patient Acceptance, E,TB, VU,DU by  at 5/1/2024 1158   Family Acceptance, E,TB, VU,DU by  at 5/1/2024 1158                         Point: Body mechanics (Done)       Learning Progress Summary             Patient Acceptance, E,TB, VU,DU by  at 5/1/2024 1158   Family Acceptance, E,TB, VU,DU by  at 5/1/2024 1158                         Point: Precautions (Done)       Learning Progress Summary             Patient Acceptance, E,TB, VU,DU by  at 5/1/2024 1158   Family Acceptance, E,TB, VU,DU by  at 5/1/2024 1158                                         User Key       Initials Effective Dates Name Provider Type Discipline     09/22/22 -  Shelbi Mayo PT Physical Therapist PT                  PT Recommendation and Plan     Plan of Care Reviewed With: patient, spouse  Outcome Evaluation: Pt is a 69 y/o M POD 0 s/p L VERO. Pt reports he lives with his spous with a ramp to enter and is (I) with mobility at baseline. Pt presents to PT with expected post-op weakness. Pt stood and ambulated 100' c RW requiring SBA. Pt demo's a slow pace but progressed to a step-through pattern without a LOB. PT reviewed hip precautions and pt completed VERO protocol. Pt is safe to D/C home today with assist and f/u with HHPT.     Time Calculation:         PT Charges       Row Name 05/01/24 1153              Time Calculation    Start Time 1140  -CS      Stop Time 1153  -CS      Time Calculation (min) 13 min  -CS      PT Received On 05/01/24  -CS         Time Calculation- PT    Total Timed Code Minutes- PT 10 minute(s)  -CS         Timed Charges    03890 - PT Therapeutic Activity Minutes 10  -CS         Total Minutes    Timed Charges Total Minutes 10  -CS       Total Minutes 10  -CS                User Key  (r) = Recorded By, (t) = Taken By, (c) = Cosigned By      Initials Name Provider Type    CS Shelbi Mayo, PT Physical Therapist                  Therapy Charges for Today       Code Description Service Date Service Provider Modifiers Qty    53356763840 HC PT THERAPEUTIC ACT EA 15 MIN 5/1/2024 Shelbi Mayo, PT GP 1    15042292109 HC PT EVAL LOW COMPLEXITY 2 5/1/2024 Shelbi Mayo, PT GP 1            PT G-Codes  Outcome Measure Options: AM-PAC 6 Clicks Basic Mobility (PT)  AM-PAC 6 Clicks Score (PT): 23  PT Discharge Summary  Anticipated Discharge Disposition (PT): home with assist, home with home health    Shelbi Mayo PT  5/1/2024

## 2024-05-01 NOTE — ANESTHESIA PREPROCEDURE EVALUATION
Anesthesia Evaluation     NPO Solid Status: > 8 hours  NPO Liquid Status: > 8 hours           Airway   Mallampati: I  No difficulty expected  Dental      Pulmonary    (+) ,sleep apnea  Cardiovascular     (+) hypertension, hyperlipidemia      Neuro/Psych  GI/Hepatic/Renal/Endo    (+) morbid obesity, GERD    Musculoskeletal     Abdominal    Substance History      OB/GYN          Other   arthritis,                 Anesthesia Plan    ASA 3     general     intravenous induction     Anesthetic plan, risks, benefits, and alternatives have been provided, discussed and informed consent has been obtained with: patient.    CODE STATUS:

## 2024-05-01 NOTE — OP NOTE
Name: John Paul Cisneros  YOB: 1955    DATE OF SURGERY: 5/1/2024    PREOPERATIVE DIAGNOSIS: Left hip end-stage osteoarthritis    POSTOPERATIVE DIAGNOSIS: Left hip end-stage osteoarthritis    PROCEDURE PERFORMED: Left  total hip replacement    SURGEON: Wilfred Serrato M.D.    ASSISTANT: ROULA POOLE    A surgical assistant was integral in ensuring a successful outcome with this procedure.  The assistant was utilized to assist in positioning the patient, draping the patient, was used throughout the case to provide with retraction of tissues, suctioning of blood and body fluids for visualization, positioning of the extremity to allow for proper exposure so that I could perform the procedure.  Without the use of a surgical assistant during this procedure I feel that the outcome may have been compromised or would have been suboptimal or at risk for complications.    IMPLANTS:  Implant Name Type Inv. Item Serial No.  Lot No. LRB No. Used Action   DEV CONTRL TISS STRATAFIX SYMM PDS PLUS KRYSTAL CT-1 60CM - [id number] Implant DEV CONTRL TISS STRATAFIX SYMM PDS PLUS KRYSTAL CT-1 60CM  [employer]  DIV OF J AND J [id number] Left 1 Implanted   DEV CONTRL TISS STRATAFIXSPIRALMNCRYL PLSPS2 REV3/0 45CM - [id number] Implant DEV CONTRL TISS STRATAFIXSPIRALMNCRYL PLSPS2 REV3/0 45CM  [employer]  DIV OF J AND J [id number] Left 1 Implanted   SHLL ACET R3 3H HDO21MS - [id number] Implant SHLL ACET R3 3H [id number]  [employer]AN[employer] AND NEPHEW [id number] Left 1 Implanted   SCRW SPH HD REFLECTION 6.5X30MM - [id number] Implant SCRW SPH HD REFLECTION 6.5X30MM  [employer] NEPHEW [id number] Left 1 Implanted   SCRW SPH HD REFLECTION 6.5X30MM - [id number] Implant SCRW SPH HD REFLECTION 6.5X30MM  [employer] NEPHEW [id number] Left 1 Implanted   LINER ACET R3 XLPE 20D 79F32SY - [id number] Implant LINER ACET R3 XLPE 20D 51S96TH  [employer] NEP[employer] [id number] Left 1 Implanted   STEM FEM/HIP POLARSTEM W/COLR STD SZ6 - [id number] Implant STEM FEM/HIP POLARSTEM W/COLR STD  SZ6  [employer]ANON AND NEPHEW [id number] Left 1 Implanted   HD FEM/HIP OXINIUM TPR 12/14 36MM PLS4 - [id number] Implant HD FEM/HIP OXINIUM TPR 12/14 36MM PLS4  SMITH AND NEPHDIAMANTE [id number] Left 1 Implanted       Estimated Blood Loss: 200 mL  Specimens : none  Complications: none    DESCRIPTION OF PROCEDURE:    The patient was taken to the operating room and placed in the supine position. A sequential compression device was carefully placed on the non-operative leg. Preoperative antibiotics were administered. Surgical time out was performed. After adequate induction of anesthesia the patient was then transferred onto the  table and positioned appropriately in the lateral decubitus position. The hip was then prepped and draped in the usual sterile fashion.    A posterior lateral surgical incision was then made.  The gluteus tami fascia was then divided.  The gluteus tami muscle was then bluntly dissected.  A Charnley self-retaining retractor was then placed.  A posterior capsulotomy was then performed.  The superior limb was divided in line with the piriformis tendon.  The hip was then dislocated.  There were end-stage arthritic findings.  The femoral neck osteotomy was performed according to the level dictated by the template.  The acetabulum was exposed with standard retractors.  The labrum and pulvinar were then excised.  The cup was then medialized with the starting acetabular reamer to the medial wall.  I then progressively reamed up to the appropriate size in 45°of abduction and 20° of anteversion. Line to line reaming was performed. At this point the bone was nicely prepared.  There was excellent bleeding bone. We then impacted the acetabular component in 45 of abduction and 20 of anteversion the cup was stable.  Per routine we augmented the fixation with 2 screws in the posterior and superior quadrant  The final liner was then placed and it locked in nicely.  At this point we injected the hip with anesthetic  cocktail solution.  We then turned our attention to the femur.   Standard retractors were placed to expose the femur.  The box osteotome was used to create a starting point.  The canal finder was then used to sound the canal.  The lateralizing reamer was then used to lateralize into the greater trochanter.  We progressively reamed and broached until the broach was very solid to axial and rotational stresses.  At this point we placed the trial neck and head.  Hip was very stable to flexion and internal rotation as well as extension and external rotation.  The leg lengths were measured to be equal.  We then removed the trial components, copiously irrigated the hip, and then impacted the final stem.  At this point we then trialed and chose the final head. The head was placed on a clean dry taper.  The leg lengths were again measured to be equal.  At this point the hip was copiously irrigated with pulse lavage and the capsule was then reapproximated with #1 PDS suture, and the remainder of the hip was closed in multiple layers in standard fashion..  There was excellent hemostasis. We placed a one-eighth inch Hemovac drain.  Sterile dressings were applied. At the end of the case, the sponge and needle counts were reported as being correct. There were no known complications. The patient was then transported to the recovery room.      Wilfred Serrato M.D.  5/1/2024

## 2024-05-01 NOTE — ANESTHESIA POSTPROCEDURE EVALUATION
Patient: John Paul Cisneros    Procedure Summary       Date: 05/01/24 Room / Location:  [facility] [address] [place] [address] [facility]    Anesthesia Start: 0802 Anesthesia Stop: 1013    Procedure: TOTAL HIP ARTHROPLASTY (Left: Hip) Diagnosis:       Primary osteoarthritis of left hip      (Primary osteoarthritis of left hip [M16.12])    Surgeons: Wilfred Serrato MD Provider: Ld Gates MD    Anesthesia Type: general ASA Status: 3            Anesthesia Type: general    Vitals  Vitals Value Taken Time   /38 05/01/24 1115   Temp 36.4 °C (97.5 °F) 05/01/24 1100   Pulse 55 05/01/24 1116   Resp 14 05/01/24 1115   SpO2 100 % 05/01/24 1116   Vitals shown include unfiled device data.        Post Anesthesia Care and Evaluation    Patient location during evaluation: bedside  Patient participation: complete - patient participated  Level of consciousness: awake and alert  Pain management: adequate    Airway patency: patent  Anesthetic complications: No anesthetic complications    Cardiovascular status: acceptable  Respiratory status: acceptable  Hydration status: acceptable    Comments: BP (!) 131/38   Pulse 57   Temp 36.4 °C (97.5 °F) (Oral)   Resp 14   SpO2 100%

## 2024-05-01 NOTE — ANESTHESIA PROCEDURE NOTES
Airway  Urgency: elective    Date/Time: 5/1/2024 8:11 AM  Airway not difficult    General Information and Staff    Patient location during procedure: OR  Anesthesiologist: Ld Gates MD CRNA/CAA: Tiera Gunn CRNA    Indications and Patient Condition  Indications for airway management: airway protection    Preoxygenated: yes  MILS not maintained throughout  Mask difficulty assessment: 2 - vent by mask + OA or adjuvant +/- NMBA    Final Airway Details  Final airway type: endotracheal airway      Successful airway: ETT  Cuffed: yes   Successful intubation technique: direct laryngoscopy  Facilitating devices/methods: intubating stylet  Endotracheal tube insertion site: oral  Blade: Anna  Blade size: 4  ETT size (mm): 8.0  Cormack-Lehane Classification: grade I - full view of glottis  Placement verified by: chest auscultation and capnometry   Measured from: lips  ETT/EBT  to lips (cm): 24  Number of attempts at approach: 1  Assessment: lips, teeth, and gum same as pre-op and atraumatic intubation

## 2024-05-02 ENCOUNTER — TELEPHONE (OUTPATIENT)
Dept: ORTHOPEDIC SURGERY | Facility: CLINIC | Age: 69
End: 2024-05-02
Payer: MEDICARE

## 2024-05-02 NOTE — CASE MANAGEMENT/SOCIAL WORK
Continued Stay Note  [facility]     Patient Name: John Paul Cisneros  MRN: [id number]  Today's Date: 5/2/2024    Admit Date: 5/1/2024    Plan: [facility] with [facility]   Discharge Plan       Row Name 05/02/24 1356       Plan    Plan Home with [facility]    Patient/Family in Agreement with Plan yes    Provided Post Acute Provider List? Yes    Post Acute Provider List Home Health    Delivered To Patient    Method of Delivery Telephone                   Discharge Codes    No documentation.                 Expected Discharge Date and Time       Expected Discharge Date Expected Discharge Time    May 1, 2024               Shannon Epley, RN

## 2024-05-02 NOTE — TELEPHONE ENCOUNTER
Spoke to Anika at PT, she states this typically takes 24-48 hours but she is going to contact HH. I called and spoke to the patient and informed him of what I was told. Patient states understanding.

## 2024-05-02 NOTE — TELEPHONE ENCOUNTER
Caller: John Paul Cisneros    Relationship to patient: Self    Best call back number: [phone] (home)     Patient is needing: PATIENT HAD SX WITH DR ARTHUR YESTERDAY AND WAS DISCHARGED AND TOLD HOME HEALTH WOULD BE COMING TODAY. PATIENT HAS NOT HEARD FROM ANYONE ABOUT GETTING A VISIT.   PLEASE ADVISE PATIENT OR HAVE HOME HEALTH ADVISE PATIENT ON BEING SEEN

## 2024-05-02 NOTE — DISCHARGE PLACEMENT REQUEST
"Katya Cisneros (68 y.o. Male)       Date of Birth   1955    Social Security Number       Address   [address] [address]    Home Phone   [phone]    MRN   [id number]       [facility]    Marital Status                               Admission Date   5/1/24    Admission Type   Elective    Admitting Provider   Wilfred Serrato MD    Attending Provider       Department, Room/Bed   [facility], [facility]       Discharge Date   5/1/2024    Discharge Disposition   Home-Health Care [facility]    Discharge Destination                                 Attending Provider: (none)   Allergies: Oxycodone, Propoxyphene, Adhesive Tape, Indapamide    Isolation: None   Infection: None   Code Status: Not on file    Ht: 185.4 cm (72.99\")   Wt: 145 kg (319 lb)    Admission Cmt: None   Principal Problem: OA (osteoarthritis) of hip [M16.9]                   Active Insurance as of 5/1/2024       Primary Coverage       Payor Plan Insurance Group Employer/Plan Group    HUMANA MEDICARE REPLACEMENT HU[employer] MED ADV GROUP [id number]       Payor Plan Address Payor Plan Phone Number Payor Plan Fax Number Effective Dates    [address] [address] [phone]  11/1/2021 - None Entered    [facility] [id number]         Subscriber Name Subscriber Birth Date Member ID       KATYA CISNEROS 1955 [id number]                     Emergency Contacts        (Rel.) Home Phone Work Phone Mobile Phone    Kaitlin Cisneros (Spouse) [phone] -- --              "

## 2024-05-03 ENCOUNTER — TELEPHONE (OUTPATIENT)
Dept: ORTHOPEDIC SURGERY | Facility: HOSPITAL | Age: 69
End: 2024-05-03
Payer: MEDICARE

## 2024-05-03 ENCOUNTER — TELEPHONE (OUTPATIENT)
Dept: ORTHOPEDIC SURGERY | Facility: CLINIC | Age: 69
End: 2024-05-03
Payer: MEDICARE

## 2024-05-03 NOTE — TELEPHONE ENCOUNTER
[facility] called to verbalize orders for post total hip PT twice per week for 2 weeks

## 2024-05-03 NOTE — TELEPHONE ENCOUNTER
Post op day 2  Discharge Instructions:  Ask patient about his or her discharge instructions  ?  Patient confirmed understanding   []  Further instruction needed   What, if any, recommendations, teaching, or interventions did you provide? Click or tap here to enter text.  Health status:  Pain controlled Yes   Taking the pain medication and it does help.   Recommended interventions:  Yes  incision/dressing status   ?  Clean without redness, drainage, odor  []  Redness    []  Drainage - color Click or tap here to enter text.  []  Odor  HERI - Green light blinking Yes  Difficulties urination No  No issues   Medications:  ?Medications reviewed with patient/family/caregiver  Patient taking medications as prescribed?   Yes  If not taking medications as prescribed, note specific medicine(s) and reason for each:  Click or tap here to enter text.  Hospital Follow Up Plan:  Follow up Appointment with Orthopedic surgeon:  ?Has f/u appointment                []Scheduled f/u appointment  Home Care ordered at discharge?    Yes        Home Care started, or contact made?    Yes   If no, action taken:   DME obtained/used in home?         Yes   Using IS  Choose an item.   Other information: Mr. Cisneros said he is doing ok. He is up and walking. He just finished with PT and did well. He does have some increased pain, but taking the pain medication and it dies help. He is icing. Dressing looks good no issues. He said he has started itching and does have reactions to adhesive so hoping that is not the case with this dressing as well. BM's are good. Things are moving along quite well. Mr. Cisneros doesn't have any questions for me at this time. He has my contact information should he need anything.

## 2024-05-06 DIAGNOSIS — Z96.642 STATUS POST TOTAL HIP REPLACEMENT, LEFT: ICD-10-CM

## 2024-05-06 RX ORDER — HYDROCODONE BITARTRATE AND ACETAMINOPHEN 7.5; 325 MG/1; MG/1
1 TABLET ORAL EVERY 4 HOURS PRN
Qty: 40 TABLET | Refills: 0 | Status: SHIPPED | OUTPATIENT
Start: 2024-05-06

## 2024-05-16 ENCOUNTER — OFFICE VISIT (OUTPATIENT)
Dept: ORTHOPEDIC SURGERY | Facility: CLINIC | Age: 69
End: 2024-05-16
Payer: MEDICARE

## 2024-05-16 VITALS — HEIGHT: 73 IN | WEIGHT: 311.7 LBS | HEART RATE: 98 BPM | BODY MASS INDEX: 41.31 KG/M2

## 2024-05-16 DIAGNOSIS — R52 PAIN: Primary | ICD-10-CM

## 2024-05-16 PROCEDURE — 99024 POSTOP FOLLOW-UP VISIT: CPT | Performed by: ORTHOPAEDIC SURGERY

## 2024-05-16 NOTE — PROGRESS NOTES
John Paul Cisneros : 1955 MRN: [id number] DATE: 2024    DIAGNOSIS: 2 week follow up left total hip     SUBJECTIVE:Patient returns today for 2 week follow up of left total hip replacement. Patient reports doing well with no unusual complaints. Appears to be progressing appropriately.    OBJECTIVE:   Exam:. The incision is healing appropriately. No sign of infection. Range of motion is progressing as expected. The calf is soft and nontender with a negative Homans sign.    DIAGNOSTIC STUDIES  Xrays: 2 views of the left hip (AP pelvis and lateral left hip) were ordered and reviewed for evaluation of recent hip replacement. They demonstrate a well positioned, well aligned hip replacement without complicating factors noted. In comparison with previous films there has been interval implant placement.    ASSESSMENT: 2 week status post left hip replacement.    PLAN: 1) Staples removed and steri strips applied   2) PT exercises   3) Discontinue RITCHIE hose   4) Continue ice PRN   5) WBAT   6)  Xarelto  for  2 more weeks   7) Follow up in 6 weeks with repeat Xrays of left hip (2views)    Wilfred Serrato MD  2024

## 2024-05-29 ENCOUNTER — TELEPHONE (OUTPATIENT)
Dept: ORTHOPEDIC SURGERY | Facility: HOSPITAL | Age: 69
End: 2024-05-29
Payer: MEDICARE

## 2024-05-29 NOTE — TELEPHONE ENCOUNTER
Attempted to reach Mr. Cisneros to see how he has been doing since his LTH 5/1. Message left at this time.

## 2024-06-27 ENCOUNTER — OFFICE VISIT (OUTPATIENT)
Dept: ORTHOPEDIC SURGERY | Facility: CLINIC | Age: 69
End: 2024-06-27
Payer: MEDICARE

## 2024-06-27 VITALS — HEIGHT: 73 IN | BODY MASS INDEX: 41.75 KG/M2 | WEIGHT: 315 LBS | TEMPERATURE: 97.7 F

## 2024-06-27 DIAGNOSIS — Z96.642 STATUS POST TOTAL HIP REPLACEMENT, LEFT: ICD-10-CM

## 2024-06-27 DIAGNOSIS — R52 PAIN: Primary | ICD-10-CM

## 2024-06-27 PROCEDURE — 99024 POSTOP FOLLOW-UP VISIT: CPT | Performed by: NURSE PRACTITIONER

## 2024-06-27 PROCEDURE — 73502 X-RAY EXAM HIP UNI 2-3 VIEWS: CPT | Performed by: NURSE PRACTITIONER

## 2024-06-27 RX ORDER — ASPIRIN 81 MG/1
81 TABLET ORAL DAILY
COMMUNITY

## 2024-06-27 RX ORDER — UBIDECARENONE 75 MG
50 CAPSULE ORAL DAILY
COMMUNITY

## 2024-06-27 RX ORDER — MELOXICAM 15 MG/1
15 TABLET ORAL DAILY
COMMUNITY
Start: 2024-06-23

## 2024-06-27 NOTE — PROGRESS NOTES
John Paul Cisneros : 1955 MRN: [id number] DATE: 2024    DIAGNOSIS: 8 week follow up left total hip Posterior Lateral Approach    SUBJECTIVE:Patient returns today for 8 week follow up of left total hip replacement. Patient reports doing well with no unusual complaints.  Reports his pain level is minimal and rates it as a 3 out of 10.  States that he is still going to outpatient physical therapy and has still having some slight weakness in his quads.  Reports that physical therapist is still recommending continuation of visits due to this issue.  Patient however is ambulating safely without a cane or walker at this time.  Denies any instability or buckling issues.  Denies any sinus symptoms of infection, and he is without any other significant complaints today.    OBJECTIVE:   Exam:. The incision is healed. No sign of infection. Range of motion is progressing as expected. The calf is soft and nontender with a negative Homans sign. Strength 3/5.    DIAGNOSTIC STUDIES  Xrays: 2 views of the left hip (AP pelvis and lateral left hip) were ordered and reviewed for evaluation of recent hip replacement. They demonstrate a well positioned, well aligned hip replacement without complicating factors noted. In comparison with previous films there has been interval implant placement.    ASSESSMENT: 8 week status post left hip replacement Posterior Lateral Approach    PLAN: 1) Activity as tolerated   2) Continue hip strengthening exercises with physical therapy   3) Follow up 1 year post-op with repeat Xrays of left hip (2views AP Pelvis and lateral left hip)    ADRIEL Abbasi  2024

## 2024-07-30 NOTE — PROGRESS NOTES
PATIENT INFORMATION  John Paul Cisneros       - 1955    CHIEF COMPLAINT  Chief Complaint   Patient presents with   • Heartburn   heartburn, diarrhea    HISTORY OF PRESENT ILLNESS  Has been follow by [facility] for Q 5 Years colonsocpy and his last was 2018.    Recent acute abd distension and pain after eating Brayden, and awoke w CP and no help from [facility]. SO ER and Cardiac w/u was negative and was given carafate ramandeep add to his daily PPI    Helped by carafate but is out of that now.  Has been treated for HP but  is on Meloxicam for spinal            REVIEW OF SYSTEMS  Review of Systems   Constitutional: Positive for fatigue.   Respiratory: Positive for apnea.    Cardiovascular: Positive for chest pain.   Gastrointestinal: Positive for abdominal distention.   Musculoskeletal: Positive for back pain and joint swelling.   All other systems reviewed and are negative.        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 [G47.14]   • Class 3 severe obesity due to excess calories with serious comorbidity and body mass index (BMI) of 40.0 to 44.9 in adult (CMS/Formerly [facility]) [E66.01, Z68.41]   • ANNA on auto CPAP [G47.33, Z99.89]         PAST MEDICAL HISTORY  Past Medical History:   Diagnosis Date   • DVT (deep venous thrombosis) (CMS/[facility])    • Hypertension    • Osteoarthritis    • Sleep apnea          SURGICAL HISTORY  Past Surgical History:   Procedure Laterality Date   • CARPAL TUNNEL RELEASE     • CHOLECYSTECTOMY  2014   • HAMMER TOE REPAIR     • HERNIA REPAIR     • HIP BIPOLAR REPLACEMENT Right 10/2010   • KNEE ARTHROSCOPY  10/2014   • THYROID SURGERY           FAMILY HISTORY  Family History   Problem Relation Age of Onset   • Hypertension Other    • Colon cancer Other          SOCIAL HISTORY  Social History     Occupational  "History   • Not on file   Tobacco Use   • Smoking status: Never Smoker   Substance and Sexual Activity   • Alcohol use: No   • Drug use: No   • Sexual activity: Defer       Debilities/Disabilities Identified: None    Emotional Behavior: Appropriate    CURRENT MEDICATIONS    Current Outpatient Medications:   •  AMLODIPINE BESYLATE PO, Take 10 mg by mouth., Disp: , Rfl:   •  aspirin (KAY ASPIRIN EC LOW DOSE) 81 MG EC tablet, Take 81 mg by mouth Daily., Disp: , Rfl:   •  atorvastatin (LIPITOR) 80 MG tablet, Take 80 mg by mouth Daily., Disp: , Rfl:   •  febuxostat (ULORIC) 40 MG tablet, Take 40 mg by mouth Daily., Disp: , Rfl:   •  meloxicam (MOBIC) 15 MG tablet, Take 15 mg by mouth Daily., Disp: , Rfl:   •  omeprazole (priLOSEC) 40 MG capsule, Take 40 mg by mouth Daily., Disp: , Rfl:   •  sildenafil (REVATIO) 20 MG tablet, Take 20 mg by mouth 3 (Three) Times a Day., Disp: , Rfl:   •  Testosterone (AXIRON) 30 MG/ACT solution, Place  on the skin., Disp: , Rfl:   •  sucralfate (CARAFATE) 1 g tablet, Take 1 tablet by mouth 4 (Four) Times a Day for 60 days., Disp: 60 tablet, Rfl: 0    ALLERGIES  Darvon [propoxyphene]; Oxycodone; and Adhesive tape    VITALS  Vitals:    08/05/19 1558   BP: 144/78   Resp: 18   Weight: (!) 147 kg (325 lb)   Height: 185.4 cm (73\")       LAST RESULTS   Admission on 07/10/2019, Discharged on 07/10/2019   Component Date Value Ref Range Status   • Glucose 07/10/2019 133* 65 - 99 mg/dL Final   • BUN 07/10/2019 14  8 - 23 mg/dL Final   • Creatinine 07/10/2019 0.90  0.76 - 1.27 mg/dL Final   • Sodium 07/10/2019 140  136 - 145 mmol/L Final   • Potassium 07/10/2019 4.4  3.5 - 5.2 mmol/L Final   • Chloride 07/10/2019 102  98 - 107 mmol/L Final   • CO2 07/10/2019 24.7  22.0 - 29.0 mmol/L Final   • Calcium 07/10/2019 9.0  8.6 - 10.5 mg/dL Final   • Total Protein 07/10/2019 7.9  6.0 - 8.5 g/dL Final   • Albumin 07/10/2019 3.90  3.50 - 5.20 g/dL Final   • ALT (SGPT) 07/10/2019 18  1 - 41 U/L Final   • AST " (SGOT) 07/10/2019 18  1 - 40 U/L Final   • Alkaline Phosphatase 07/10/2019 124* 39 - 117 U/L Final   • Total Bilirubin 07/10/2019 0.4  0.2 - 1.2 mg/dL Final   • eGFR Non  Amer 07/10/2019 84  >60 mL/min/1.73 Final   • Globulin 07/10/2019 4.0  gm/dL Final   • A/G Ratio 07/10/2019 1.0  g/dL Final   • BUN/Creatinine Ratio 07/10/2019 15.6  7.0 - 25.0 Final   • Anion Gap 07/10/2019 13.3  5.0 - 15.0 mmol/L Final   • Protime 07/10/2019 12.3  12.1 - 15.0 Seconds Final   • INR 07/10/2019 0.94  0.90 - 1.10 Final   • PTT 07/10/2019 31.9  24.3 - 38.1 seconds Final   • Lipase 07/10/2019 18  13 - 60 U/L Final   • Troponin T 07/10/2019 <0.010  0.000-<0.030 ng/mL Final   • D-Dimer, Quantitative 07/10/2019 0.46  0.00 - 0.46 MCGFEU/mL Final   • WBC 07/10/2019 8.37  3.40 - 10.80 10*3/mm3 Final   • RBC 07/10/2019 6.05* 4.14 - 5.80 10*6/mm3 Final   • Hemoglobin 07/10/2019 16.5  13.0 - 17.7 g/dL Final   • Hematocrit 07/10/2019 51.9* 37.5 - 51.0 % Final   • MCV 07/10/2019 85.8  79.0 - 97.0 fL Final   • MCH 07/10/2019 27.3  26.6 - 33.0 pg Final   • MCHC 07/10/2019 31.8  31.5 - 35.7 g/dL Final   • RDW 07/10/2019 13.5  12.3 - 15.4 % Final   • RDW-SD 07/10/2019 42.2  37.0 - 54.0 fl Final   • MPV 07/10/2019 9.9  6.0 - 12.0 fL Final   • Platelets 07/10/2019 339  140 - 450 10*3/mm3 Final   • Neutrophil % 07/10/2019 66.4  42.7 - 76.0 % Final   • Lymphocyte % 07/10/2019 17.3* 19.6 - 45.3 % Final   • Monocyte % 07/10/2019 12.1* 5.0 - 12.0 % Final   • Eosinophil % 07/10/2019 2.7  0.3 - 6.2 % Final   • Basophil % 07/10/2019 0.8  0.0 - 1.5 % Final   • Immature Grans % 07/10/2019 0.7* 0.0 - 0.5 % Final   • Neutrophils, Absolute 07/10/2019 5.55  1.70 - 7.00 10*3/mm3 Final   • Lymphocytes, Absolute 07/10/2019 1.45  0.70 - 3.10 10*3/mm3 Final   • Monocytes, Absolute 07/10/2019 1.01* 0.10 - 0.90 10*3/mm3 Final   • Eosinophils, Absolute 07/10/2019 0.23  0.00 - 0.40 10*3/mm3 Final   • Basophils, Absolute 07/10/2019 0.07  0.00 - 0.20 10*3/mm3 Final   •  Immature Grans, Absolute 07/10/2019 0.06* 0.00 - 0.05 10*3/mm3 Final   • nRBC 07/10/2019 0.0  0.0 - 0.2 /100 WBC Final   • Troponin T 07/10/2019 <0.010  0.000-<0.030 ng/mL Final     Xr Chest 2 View    Result Date: 7/10/2019  Narrative: CHEST X-RAY, 07/10/2019     HISTORY: 64-year-old male in the ED complaining of new onset midsternal chest pain beginning last evening.  TECHNIQUE: PA and lateral upright chest x-ray.  FINDINGS: Mild cardiomegaly. Pulmonary vascularity is normal.  Shallow lung expansion with linear scarring or atelectasis in the left lung base. Lungs appear clear. No visible pulmonary edema, airspace consolidation or pleural effusion.  Rounded opacity behind the heart may represent a hiatal hernia.      Impression: 1. No active disease. 2. Mild cardiomegaly. Pulmonary vascularity is normal. 3. Possible hiatal hernia.  This report was finalized on 7/10/2019 7:58 AM by Dr. Stephan Armenta MD.        PHYSICAL EXAM  Physical Exam   Constitutional: He is oriented to person, place, and time. He appears well-developed and well-nourished.   Eyes: Conjunctivae and EOM are normal. Pupils are equal, round, and reactive to light. No scleral icterus.   Neck: Normal range of motion. Neck supple. No thyromegaly present.   Cardiovascular: Normal rate, regular rhythm, normal heart sounds and intact distal pulses. Exam reveals no gallop.   No murmur heard.  Pulmonary/Chest: Effort normal and breath sounds normal. He has no wheezes. He has no rales.   Abdominal: Soft. Bowel sounds are normal. He exhibits no shifting dullness, no distension, no fluid wave, no abdominal bruit, no ascites and no mass. There is no hepatosplenomegaly. There is tenderness in the right upper quadrant. There is no guarding and negative Parra's sign. Hernia confirmed negative in the ventral area.   Musculoskeletal: Normal range of motion. He exhibits no edema.   Lymphadenopathy:     He has no cervical adenopathy.   Neurological: He is alert  and oriented to person, place, and time.   Skin: Skin is warm and dry. No rash noted. He is not diaphoretic. No erythema.       ASSESSMENT  Diagnoses and all orders for this visit:    Other chest pain  -     Case Request; Standing  -     Case Request    Gastroesophageal reflux disease with esophagitis    Personal history of colonic polyps    Spinal stenosis of lumbar region, unspecified whether neurogenic claudication present    Other orders  -     Follow Anesthesia Guidelines / Standing Orders; Future  -     Obtain Informed Consent; Future  -     Obtain Informed Consent; Standing          PLAN  Return in about 2 months (around 10/5/2019).                           Render In Strict Bullet Format?: No Continue Regimen: wash face 1x daily w zinc bar and 1x daily w gentle cleanser\\nclindamycin 1.2 % (1 % base)-benzoyl peroxide 5 % topical gel - Apply a pea sized amount to the full face once daily in the morning after cleansing. Detail Level: Zone Modify Regimen: ketoconazole 2 % shampoo, clobetasol 0.05 % scalp solution PRN x flares

## 2024-10-24 ENCOUNTER — OFFICE VISIT (OUTPATIENT)
Dept: SLEEP MEDICINE | Facility: HOSPITAL | Age: 69
End: 2024-10-24
Payer: MEDICARE

## 2024-10-24 VITALS — HEART RATE: 72 BPM | HEIGHT: 73 IN | BODY MASS INDEX: 41.75 KG/M2 | OXYGEN SATURATION: 98 % | WEIGHT: 315 LBS

## 2024-10-24 DIAGNOSIS — G47.26 CIRCADIAN RHYTHM SLEEP DISORDER, SHIFT WORK TYPE: ICD-10-CM

## 2024-10-24 DIAGNOSIS — G47.22 CIRCADIAN RHYTHM SLEEP DISORDER, ADVANCED SLEEP PHASE TYPE: ICD-10-CM

## 2024-10-24 DIAGNOSIS — E66.01 CLASS 3 SEVERE OBESITY DUE TO EXCESS CALORIES WITH SERIOUS COMORBIDITY AND BODY MASS INDEX (BMI) OF 40.0 TO 44.9 IN ADULT: ICD-10-CM

## 2024-10-24 DIAGNOSIS — E66.813 CLASS 3 SEVERE OBESITY DUE TO EXCESS CALORIES WITH SERIOUS COMORBIDITY AND BODY MASS INDEX (BMI) OF 40.0 TO 44.9 IN ADULT: ICD-10-CM

## 2024-10-24 DIAGNOSIS — G47.14 HYPERSOMNIA DUE TO ANOTHER MEDICAL CONDITION: ICD-10-CM

## 2024-10-24 DIAGNOSIS — G47.33 OSA ON CPAP: Primary | ICD-10-CM

## 2024-10-24 PROCEDURE — 1159F MED LIST DOCD IN RCRD: CPT | Performed by: INTERNAL MEDICINE

## 2024-10-24 PROCEDURE — G0463 HOSPITAL OUTPT CLINIC VISIT: HCPCS

## 2024-10-24 PROCEDURE — 99213 OFFICE O/P EST LOW 20 MIN: CPT | Performed by: INTERNAL MEDICINE

## 2024-10-24 PROCEDURE — 1160F RVW MEDS BY RX/DR IN RCRD: CPT | Performed by: INTERNAL MEDICINE

## 2024-10-24 NOTE — PROGRESS NOTES
"Follow Up [facility] Note     Chief Complaint:  ANNA     Primary Care Physician: Zaira Colmenares MD    Interval History:   The patient is a 69 y.o. male who I last saw 10/12/2023 and that note was reviewed.  The patient reports he is doing well without new complaints.  He goes to bed at 8 PM and gets out of bed at 5 AM.    Since I last saw him he did have his left hip replaced.    Review of Systems:    A complete review of systems was done and all were negative with the exception of some nasal congestion    Social History:    Social History     Socioeconomic History    Marital status:    Tobacco Use    Smoking status: Never     Passive exposure: Never    Smokeless tobacco: Never   Vaping Use    Vaping status: Never Used   Substance and Sexual Activity    Alcohol use: Never    Drug use: Never    Sexual activity: Defer       Allergies:  Oxycodone, Propoxyphene, Adhesive tape, and Indapamide     Medication Review:  Reviewed.      Vital Signs:    Vitals:    10/24/24 0830   Pulse: 72   SpO2: 98%   Weight: (!) 150 kg (330 lb)   Height: 185.4 cm (73\")     Body mass index is 43.54 kg/m².    Physical Exam:    Constitutional:  Well developed 69 y.o. male that appears in no apparent distress.  Awake & oriented times 3.  Normal mood with normal recent and remote memory and normal judgement.  Eyes:  Conjunctivae normal.  Oropharynx: Previously, moist mucous membranes without exudate and a normal-sized tongue and class III Mallampati airway.    Self-administered Roscoe Sleepiness Scale test results: 11, previously 11  0-5 Lower normal daytime sleepiness  6-10 Higher normal daytime sleepiness  11-12 Mild, 13-15 Moderate, & 16-24 Severe excessive daytime sleepiness     Downloaded PAP Data Evaluated For Therapeutic Response and Compliance:  DME is [facility] and the patient uses a nasal pillows.  Downloads between 7/25 and 10/22/2024 compliance 100%.  Average usage is 8 hours and 41 minutes.  Average AHI is normal " without a significant leak.  Average auto CPAP pressure is 13.9 and his ResMed auto CPAP is 12-20    I have reviewed the above results and compared them with the patient's last downloads and reviewed with the patient.    Impression:   Obstructive sleep apnea adequately treated with ResMed auto CPAP. The patient appears to be at goal with good compliance and usage. The patient has some persistent complaints of hypersomnolence.  Circadian rhythm sleep disorder, advanced sleep phase type, shiftwork type    Plan:  Good sleep hygiene measures should be maintained.  Weight loss would be beneficial in this patient who has class III severe obesity by Body mass index is 43.54 kg/m².      After evaluating the patient and assessing results available, the patient is benefiting from the treatment being provided.     The patient will continue ResMed auto CPAP.  Potential side effects of not using PAP therapy reviewed and addressed as needed.  After clinical evaluation and review of downloads, I recommend no changes to the patient's pressures.  A new prescription will be sent to the patient's DME.    I answered all of the patient's questions.  The patient will call the [facility] for any problems and will follow up in 1 year's.      John Abernathy MD  Sleep Medicine  10/24/24  08:32 EDT

## 2025-04-28 ENCOUNTER — OFFICE VISIT (OUTPATIENT)
Dept: ORTHOPEDIC SURGERY | Facility: CLINIC | Age: 70
End: 2025-04-28
Payer: MEDICARE

## 2025-04-28 VITALS — TEMPERATURE: 98 F | WEIGHT: 315 LBS | BODY MASS INDEX: 41.75 KG/M2 | HEIGHT: 73 IN

## 2025-04-28 DIAGNOSIS — Z96.642 STATUS POST TOTAL HIP REPLACEMENT, LEFT: ICD-10-CM

## 2025-04-28 DIAGNOSIS — R52 PAIN: Primary | ICD-10-CM

## 2025-04-28 DIAGNOSIS — M70.62 TROCHANTERIC BURSITIS OF LEFT HIP: ICD-10-CM

## 2025-04-28 PROCEDURE — 1160F RVW MEDS BY RX/DR IN RCRD: CPT | Performed by: NURSE PRACTITIONER

## 2025-04-28 PROCEDURE — 1159F MED LIST DOCD IN RCRD: CPT | Performed by: NURSE PRACTITIONER

## 2025-04-28 PROCEDURE — 73502 X-RAY EXAM HIP UNI 2-3 VIEWS: CPT | Performed by: NURSE PRACTITIONER

## 2025-04-28 PROCEDURE — 99213 OFFICE O/P EST LOW 20 MIN: CPT | Performed by: NURSE PRACTITIONER

## 2025-04-28 RX ORDER — OXYCODONE AND ACETAMINOPHEN 5; 325 MG/1; MG/1
TABLET ORAL
COMMUNITY
Start: 2025-01-11

## 2025-04-28 RX ORDER — IRBESARTAN 300 MG/1
300 TABLET ORAL DAILY
COMMUNITY
Start: 2024-12-06 | End: 2025-06-04

## 2025-04-28 RX ORDER — AMLODIPINE BESYLATE 10 MG/1
10 TABLET ORAL DAILY
COMMUNITY
Start: 2025-02-12

## 2025-04-28 RX ORDER — FEBUXOSTAT 40 MG/1
40 TABLET, FILM COATED ORAL DAILY
COMMUNITY
Start: 2024-12-06

## 2025-04-28 NOTE — PROGRESS NOTES
Patient: John Paul Cisneros  YOB: 1955 69 y.o. male  Medical Record Number: [id number]    Chief Complaints:   Chief Complaint   Patient presents with    Left Hip - Follow-up       History of Present Illness:John Paul Cisneros is a 69 y.o. male who presents for 1 year follow-up left total hip replacement overall doing well, he has some occasional lateral hip pain, but not severe.  Overall doing well.    Allergies:   Allergies   Allergen Reactions    Oxycodone Other (See Comments)     ABDOMINAL Cramps    Propoxyphene Other (See Comments)     Floating in the clouds    Adhesive Tape Hives, Itching and Rash     EKG PATCHES    Indapamide Cough       Medications:   Current Outpatient Medications   Medication Sig Dispense Refill    acyclovir (ZOVIRAX) 200 MG capsule Take 1 capsule by mouth As Needed.      amLODIPine (NORVASC) 10 MG tablet Take 1 tablet by mouth Daily.      amLODIPine (NORVASC) 10 MG tablet Take 1 tablet by mouth Daily.      amoxicillin (AMOXIL) 500 MG capsule Take 4 capsules by mouth As Needed. Prior to dental visit      aspirin 81 MG EC tablet Take 1 tablet by mouth Daily.      atorvastatin (LIPITOR) 80 MG tablet Take 1 tablet by mouth Daily.      carvedilol (COREG) 12.5 MG tablet Take 1 tablet by mouth 2 (Two) Times a Day With Meals.      febuxostat (ULORIC) 40 MG tablet Take 1 tablet by mouth Daily.      febuxostat (ULORIC) 40 MG tablet Take 1 tablet by mouth Daily.      HYDROcodone-acetaminophen (NORCO) 7.5-325 MG per tablet Take 1 tablet by mouth Every 4 (Four) Hours As Needed for Moderate Pain or Severe Pain. 40 tablet 0    irbesartan (AVAPRO) 300 MG tablet Take 1 tablet by mouth Every Night.      irbesartan (AVAPRO) 300 MG tablet Take 1 tablet by mouth Daily.      meloxicam (MOBIC) 15 MG tablet Take 1 tablet by mouth Daily.      metFORMIN ER (GLUCOPHAGE-XR) 500 MG 24 hr tablet Take 1 tablet by mouth Daily With Breakfast.      omeprazole (priLOSEC) 40 MG capsule Take 1 capsule by mouth  Daily.      ondansetron (Zofran) 4 MG tablet Take 1 tablet by mouth Every 8 (Eight) Hours As Needed for Nausea or Vomiting for up to 10 doses. 10 tablet 0    oxyCODONE-acetaminophen (PERCOCET) 5-325 MG per tablet       psyllium (METAMUCIL) 58.6 % packet Take 1 packet by mouth 2 (Two) Times a Day.      rivaroxaban (XARELTO) 10 MG tablet Take 1 tablet by mouth Daily. 30 tablet 1    sucralfate (CARAFATE) 1 g tablet Take 1 tablet by mouth 4 (Four) Times a Day. (Patient taking differently: Take 1 tablet by mouth 4 (Four) Times a Day As Needed.) 120 tablet 3    tadalafil (CIALIS) 5 MG tablet Take 1 tablet by mouth Daily As Needed for Erectile Dysfunction.      tamsulosin (FLOMAX) 0.4 MG capsule 24 hr capsule Take 1 capsule by mouth Daily.      Testosterone 20.25 MG/ACT (1.62%) gel Apply  topically to the appropriate area as directed Every Morning. Pt has a pump and squirts 3 every daily morning      vitamin B-12 (CYANOCOBALAMIN) 100 MCG tablet Take 0.5 tablets by mouth Daily.      VITAMIN D, CHOLECALCIFEROL, PO Take 500 mg by mouth Daily.       No current facility-administered medications for this visit.     Facility-Administered Medications Ordered in Other Visits   Medication Dose Route Frequency Provider Last Rate Last Admin    Chlorhexidine Gluconate Cloth 2 % pads   Apply externally BID Joanne Ortiz APRN        lactated ringers infusion  100 mL/hr Intravenous Continuous Hermila Sutton CRNA   Currently Infusing at 05/01/24 0802         The following portions of the patient's history were reviewed and updated as appropriate: allergies, current medications, past family history, past medical history, past social history, past surgical history and problem list.    Review of Systems:   14 point review of systems was performed. All systems negative except pertinent positives/negatives listed in HPI above    Physical Exam:   Vitals:    04/28/25 0920   Temp: 98 °F (36.7 °C)   Weight: (!) 153 kg (336 lb 14.4 oz)  "  Height: 185.4 cm (73\")       General: A and O x 3, ASA, NAD    Body mass index is 44.45 kg/m².  Clear no unusual lesions noted  Left hip patient is slightly tender over left hip greater trochanteric bursa healed otherwise has excellent range of motion no instability calf is soft and nontender      Radiology:  Xrays 2 views of the left hip were ordered and reviewed today secondary to surgery show well-placed well-positioned left total hip replacement.  Comparative views are unchanged    Assessment/Plan: Status post left VERO stable  Left hip greater trochanteric bursitis    Patient and I discussed options including conservative measures such as over-the-counter Tylenol as needed, topical anti-inflammatory, continued hip exercises, he will let me know if his symptoms do not improve we can always send him back to physical therapy we will otherwise see him back as needed      Joanne Ortiz, APRN  4/28/2025  "

## 2025-08-06 ENCOUNTER — TRANSCRIBE ORDERS (OUTPATIENT)
Age: 70
End: 2025-08-06
Payer: MEDICARE

## 2025-08-06 DIAGNOSIS — I49.3 MULTIPLE PREMATURE VENTRICULAR COMPLEXES: Primary | ICD-10-CM

## (undated) DEVICE — BNDG GZ SOF-FORM CONFRM 2X75IN LF STRL

## (undated) DEVICE — 3M™ IOBAN™ 2 ANTIMICROBIAL INCISE DRAPE 6640EZ: Brand: IOBAN™ 2

## (undated) DEVICE — TRAP FLD MINIVAC MEGADYNE 100ML

## (undated) DEVICE — UNDERCAST PADDING: Brand: DEROYAL

## (undated) DEVICE — TUBING, SUCTION, 1/4" X 20', STRAIGHT: Brand: MEDLINE INDUSTRIES, INC.

## (undated) DEVICE — SYR LUERLOK 30CC

## (undated) DEVICE — TBG PENCL TELESCP MEGADYNE SMOKE EVAC 10FT

## (undated) DEVICE — GOWN ISOL W/THUMB UNIV BLU BX/15

## (undated) DEVICE — GLV SURG PREMIERPRO ORTHO LTX PF SZ7.5 BRN

## (undated) DEVICE — GLV SURG SENSICARE W/ALOE PF LF 8 STRL

## (undated) DEVICE — DUAL CUT SAGITTAL BLADE

## (undated) DEVICE — PATIENT RETURN ELECTRODE, SINGLE-USE, CONTACT QUALITY MONITORING, ADULT, WITH 9FT CORD, FOR PATIENTS WEIGING OVER 33LBS. (15KG): Brand: MEGADYNE

## (undated) DEVICE — THE BITE BLOCK MAXI, LATEX FREE STRAP IS USED TO PROTECT THE ENDOSCOPE INSERTION TUBE FROM BEING BITTEN BY THE PATIENT.

## (undated) DEVICE — DRP C/ARM 41X74IN

## (undated) DEVICE — DISPOSABLE TOURNIQUET CUFF SINGLE BLADDER, SINGLE PORT AND QUICK CONNECT CONNECTOR: Brand: COLOR CUFF

## (undated) DEVICE — BALL PIN JURGAN 5/64 BLU

## (undated) DEVICE — GAUZE,SPONGE,4"X4",16PLY,XRAY,STRL,LF: Brand: MEDLINE

## (undated) DEVICE — SUT VIC 1 CT1 36IN J947H

## (undated) DEVICE — BALL PIN JURGAN .062 GRN

## (undated) DEVICE — HANDPIECE SET WITH COAXIAL HIGH FLOW TIP AND SUCTION TUBE: Brand: INTERPULSE

## (undated) DEVICE — ELECTRD NDL EZ CLN MOD 2.75IN

## (undated) DEVICE — SOL IRR NACL 0.9PCT 3000ML

## (undated) DEVICE — APPL CHLORAPREP W/TINT 26ML ORNG

## (undated) DEVICE — WEBRIL* CAST PADDING: Brand: DEROYAL

## (undated) DEVICE — GLV SURG TRIUMPH CLASSIC PF LTX 8 STRL

## (undated) DEVICE — SPNG GZ WOVN 4X4IN 12PLY 10/BX STRL

## (undated) DEVICE — VIAL FORMALIN CAP 10P 40ML

## (undated) DEVICE — GLV SURG BIOGEL LTX PF 8

## (undated) DEVICE — COVER,LIGHT HANDLE,FLX,2/PK: Brand: MEDLINE INDUSTRIES, INC.

## (undated) DEVICE — SYS CLS SKIN PREMIERPRO EXOFINFUSION 22CM

## (undated) DEVICE — SYR LL 3CC

## (undated) DEVICE — APPL DURAPREP IODOPHOR APL 26ML

## (undated) DEVICE — GLV SURG SENSICARE W/ALOE PF LF 7.5 STRL

## (undated) DEVICE — GLV SURG SENSICARE PI MIC PF SZ7 LF STRL

## (undated) DEVICE — SUT PDS 1 CT1 36IN Z347H

## (undated) DEVICE — 450 ML BOTTLE OF 0.05% CHLORHEXIDINE GLUCONATE IN 99.95% STERILE WATER FOR IRRIGATION, USP AND APPLICATOR.: Brand: IRRISEPT ANTIMICROBIAL WOUND LAVAGE

## (undated) DEVICE — PREMIUM WET SKIN PREP TRAY: Brand: MEDLINE INDUSTRIES, INC.

## (undated) DEVICE — GLV SURG BIOGEL M LTX PF 7 1/2

## (undated) DEVICE — ANTIBACTERIAL UNDYED BRAIDED (POLYGLACTIN 910), SYNTHETIC ABSORBABLE SUTURE: Brand: COATED VICRYL

## (undated) DEVICE — GLV SURG SENSICARE PI PF LF 7 GRN STRL

## (undated) DEVICE — DRESSING, SURESITE SELECT, 2.8'X3.50': Brand: MEDLINE

## (undated) DEVICE — MEDI-VAC YANKAUER SUCTION HANDLE W/BULBOUS TIP: Brand: CARDINAL HEALTH

## (undated) DEVICE — PRECISION THIN (9.0 X 0.38 X 25.0MM)

## (undated) DEVICE — REFLECTION FLEXIBLE DRILL 25MM: Brand: REFLECTION

## (undated) DEVICE — KWIRE SMOTH DBL/TROC .045X4IN
Type: IMPLANTABLE DEVICE | Site: TOES | Status: NON-FUNCTIONAL
Removed: 2019-11-12

## (undated) DEVICE — GOWN,NON-REINFORCED,SIRUS,SET IN SLV,XXL: Brand: MEDLINE

## (undated) DEVICE — PK ORTHO MINOR TOWER 40

## (undated) DEVICE — GLV SURG SENSICARE MICRO PF LF 7.5 STRL

## (undated) DEVICE — PREP SOL POVIDONE/IODINE BT 4OZ

## (undated) DEVICE — SHOE P/OP/CAST O/T M LG 11/13

## (undated) DEVICE — Device: Brand: DEFENDO AIR/WATER/SUCTION AND BIOPSY VALVE

## (undated) DEVICE — SUCTION CANISTER, 3000CC,SAFELINER: Brand: DEROYAL

## (undated) DEVICE — JACKT LAB F/R KNIT CUFF/COLR XLG BLU

## (undated) DEVICE — BW-412T DISP COMBO CLEANING BRUSH: Brand: SINGLE USE COMBINATION CLEANING BRUSH

## (undated) DEVICE — SKIN PREP TRAY W/CHG: Brand: MEDLINE INDUSTRIES, INC.

## (undated) DEVICE — SUT VIC 4/0 RB1 27IN J214H

## (undated) DEVICE — UNDERGLV SURG BIOGEL INDICATOR LF PF 7.5

## (undated) DEVICE — STERILE PATIENT PROTECTIVE PAD FOR IMP® KNEE POSITIONERS & COHESIVE WRAP (10 / CASE): Brand: DE MAYO KNEE POSITIONER®

## (undated) DEVICE — NEEDLE, QUINCKE 22GX3.5": Brand: MEDLINE INDUSTRIES, INC.

## (undated) DEVICE — MAT FLR ABSORBENT LG 4FT 10 2.5FT

## (undated) DEVICE — THE STERILE LIGHT HANDLE COVER IS USED WITH STERIS SURGICAL LIGHTING AND VISUALIZATION SYSTEMS.

## (undated) DEVICE — BNDG ELAS CO-FLEX SLF ADHR 2IN 5YD LF STRL

## (undated) DEVICE — PK HIP TOTL 40

## (undated) DEVICE — DRSNG GZ PETROLTM XEROFORM CURAD 1X8IN STRL

## (undated) DEVICE — FRCP BX RADJAW4 NDL 2.8 240CM LG OG BX40

## (undated) DEVICE — 3M™ IOBAN™ 2 ANTIMICROBIAL INCISE DRAPE 6650EZ: Brand: IOBAN™ 2

## (undated) DEVICE — SUT ETHLN 4/0 PS2 PLSTC 1667G

## (undated) DEVICE — SOL NACL 0.9PCT 100ML SGL

## (undated) DEVICE — BNDG ELAS ELITE V/CLOSE 6IN 5YD LF STRL

## (undated) DEVICE — PK KN TOTL 40

## (undated) DEVICE — KT DRN EVAC WND PVC PCH WTROC RND 10F400

## (undated) DEVICE — Device

## (undated) DEVICE — MASK,FACE,FLUID RESIST,SHLD,EARLOOP: Brand: MEDLINE